# Patient Record
Sex: MALE | Race: WHITE | NOT HISPANIC OR LATINO | Employment: OTHER | URBAN - METROPOLITAN AREA
[De-identification: names, ages, dates, MRNs, and addresses within clinical notes are randomized per-mention and may not be internally consistent; named-entity substitution may affect disease eponyms.]

---

## 2018-09-18 ENCOUNTER — APPOINTMENT (OUTPATIENT)
Dept: ADMISSIONS | Facility: MEDICAL CENTER | Age: 63
DRG: 029 | End: 2018-09-18
Payer: COMMERCIAL

## 2018-09-18 ENCOUNTER — HOSPITAL ENCOUNTER (OUTPATIENT)
Dept: LAB | Facility: MEDICAL CENTER | Age: 63
End: 2018-09-18
Attending: NEUROLOGICAL SURGERY
Payer: COMMERCIAL

## 2018-09-18 LAB
25(OH)D3 SERPL-MCNC: 25 NG/ML (ref 30–100)
ANION GAP SERPL CALC-SCNC: 7 MMOL/L (ref 0–11.9)
APPEARANCE UR: CLEAR
APTT PPP: 27.5 SEC (ref 24.7–36)
BILIRUB UR QL STRIP.AUTO: NEGATIVE
BUN SERPL-MCNC: 20 MG/DL (ref 8–22)
CALCIUM SERPL-MCNC: 9.7 MG/DL (ref 8.5–10.5)
CHLORIDE SERPL-SCNC: 101 MMOL/L (ref 96–112)
CO2 SERPL-SCNC: 32 MMOL/L (ref 20–33)
COLOR UR: ABNORMAL
CREAT SERPL-MCNC: 0.91 MG/DL (ref 0.5–1.4)
GLUCOSE SERPL-MCNC: 124 MG/DL (ref 65–99)
GLUCOSE UR STRIP.AUTO-MCNC: NEGATIVE MG/DL
INR PPP: 1.13 (ref 0.87–1.13)
KETONES UR STRIP.AUTO-MCNC: ABNORMAL MG/DL
LEUKOCYTE ESTERASE UR QL STRIP.AUTO: NEGATIVE
MICRO URNS: ABNORMAL
NITRITE UR QL STRIP.AUTO: NEGATIVE
PH UR STRIP.AUTO: 6.5 [PH]
POTASSIUM SERPL-SCNC: 3.9 MMOL/L (ref 3.6–5.5)
PROT UR QL STRIP: NEGATIVE MG/DL
PROTHROMBIN TIME: 14.2 SEC (ref 12–14.6)
RBC UR QL AUTO: NEGATIVE
SODIUM SERPL-SCNC: 140 MMOL/L (ref 135–145)
SP GR UR STRIP.AUTO: 1.02
UROBILINOGEN UR STRIP.AUTO-MCNC: 1 MG/DL

## 2018-09-18 PROCEDURE — 85610 PROTHROMBIN TIME: CPT

## 2018-09-18 PROCEDURE — 36415 COLL VENOUS BLD VENIPUNCTURE: CPT

## 2018-09-18 PROCEDURE — 81003 URINALYSIS AUTO W/O SCOPE: CPT

## 2018-09-18 PROCEDURE — 80048 BASIC METABOLIC PNL TOTAL CA: CPT

## 2018-09-18 PROCEDURE — 85730 THROMBOPLASTIN TIME PARTIAL: CPT

## 2018-09-18 PROCEDURE — 82306 VITAMIN D 25 HYDROXY: CPT

## 2018-09-19 ENCOUNTER — APPOINTMENT (OUTPATIENT)
Dept: RADIOLOGY | Facility: MEDICAL CENTER | Age: 63
DRG: 029 | End: 2018-09-19
Attending: NEUROLOGICAL SURGERY
Payer: COMMERCIAL

## 2018-09-19 ENCOUNTER — HOSPITAL ENCOUNTER (INPATIENT)
Facility: MEDICAL CENTER | Age: 63
LOS: 7 days | DRG: 029 | End: 2018-09-26
Attending: NEUROLOGICAL SURGERY | Admitting: NEUROLOGICAL SURGERY
Payer: COMMERCIAL

## 2018-09-19 DIAGNOSIS — D33.4 BENIGN NEOPLASM OF SPINAL CORD (HCC): ICD-10-CM

## 2018-09-19 DIAGNOSIS — D49.7 INTRADURAL EXTRAMEDULLARY SPINAL TUMOR: Primary | ICD-10-CM

## 2018-09-19 DIAGNOSIS — G89.18 POSTOPERATIVE PAIN: ICD-10-CM

## 2018-09-19 LAB
ANION GAP SERPL CALC-SCNC: 8 MMOL/L (ref 0–11.9)
BUN SERPL-MCNC: 20 MG/DL (ref 8–22)
CALCIUM SERPL-MCNC: 8.7 MG/DL (ref 8.5–10.5)
CHLORIDE SERPL-SCNC: 106 MMOL/L (ref 96–112)
CO2 SERPL-SCNC: 24 MMOL/L (ref 20–33)
CREAT SERPL-MCNC: 0.81 MG/DL (ref 0.5–1.4)
EKG IMPRESSION: NORMAL
ERYTHROCYTE [DISTWIDTH] IN BLOOD BY AUTOMATED COUNT: 52.8 FL (ref 35.9–50)
GLUCOSE SERPL-MCNC: 121 MG/DL (ref 65–99)
HCT VFR BLD AUTO: 48.5 % (ref 42–52)
HGB BLD-MCNC: 16 G/DL (ref 14–18)
MAGNESIUM SERPL-MCNC: 1.9 MG/DL (ref 1.5–2.5)
MCH RBC QN AUTO: 33.4 PG (ref 27–33)
MCHC RBC AUTO-ENTMCNC: 33 G/DL (ref 33.7–35.3)
MCV RBC AUTO: 101.3 FL (ref 81.4–97.8)
PLATELET # BLD AUTO: 181 K/UL (ref 164–446)
PMV BLD AUTO: 11.6 FL (ref 9–12.9)
POTASSIUM SERPL-SCNC: 4.6 MMOL/L (ref 3.6–5.5)
RBC # BLD AUTO: 4.79 M/UL (ref 4.7–6.1)
SODIUM SERPL-SCNC: 138 MMOL/L (ref 135–145)
TSH SERPL DL<=0.005 MIU/L-ACNC: 1.31 UIU/ML (ref 0.38–5.33)
WBC # BLD AUTO: 10.5 K/UL (ref 4.8–10.8)

## 2018-09-19 PROCEDURE — 700105 HCHG RX REV CODE 258: Performed by: INTERNAL MEDICINE

## 2018-09-19 PROCEDURE — 95940 IONM IN OPERATNG ROOM 15 MIN: CPT | Performed by: NEUROLOGICAL SURGERY

## 2018-09-19 PROCEDURE — 500002 HCHG ADHESIVE, DERMABOND: Performed by: NEUROLOGICAL SURGERY

## 2018-09-19 PROCEDURE — 500331 HCHG COTTONOID, SURG PATTIE: Performed by: NEUROLOGICAL SURGERY

## 2018-09-19 PROCEDURE — 00BT0ZZ EXCISION OF SPINAL MENINGES, OPEN APPROACH: ICD-10-PCS | Performed by: NEUROLOGICAL SURGERY

## 2018-09-19 PROCEDURE — 51785 ANAL/URINARY MUSCLE STUDY: CPT | Performed by: NEUROLOGICAL SURGERY

## 2018-09-19 PROCEDURE — 160029 HCHG SURGERY MINUTES - 1ST 30 MINS LEVEL 4: Performed by: NEUROLOGICAL SURGERY

## 2018-09-19 PROCEDURE — 85027 COMPLETE CBC AUTOMATED: CPT

## 2018-09-19 PROCEDURE — 93005 ELECTROCARDIOGRAM TRACING: CPT | Performed by: ANESTHESIOLOGY

## 2018-09-19 PROCEDURE — 84443 ASSAY THYROID STIM HORMONE: CPT

## 2018-09-19 PROCEDURE — 160041 HCHG SURGERY MINUTES - EA ADDL 1 MIN LEVEL 4: Performed by: NEUROLOGICAL SURGERY

## 2018-09-19 PROCEDURE — 110371 HCHG SHELL REV 272: Performed by: NEUROLOGICAL SURGERY

## 2018-09-19 PROCEDURE — 160035 HCHG PACU - 1ST 60 MINS PHASE I: Performed by: NEUROLOGICAL SURGERY

## 2018-09-19 PROCEDURE — 93010 ELECTROCARDIOGRAM REPORT: CPT | Performed by: INTERNAL MEDICINE

## 2018-09-19 PROCEDURE — 700111 HCHG RX REV CODE 636 W/ 250 OVERRIDE (IP): Performed by: NEUROLOGICAL SURGERY

## 2018-09-19 PROCEDURE — 770022 HCHG ROOM/CARE - ICU (200)

## 2018-09-19 PROCEDURE — A4338 INDWELLING CATHETER LATEX: HCPCS | Performed by: NEUROLOGICAL SURGERY

## 2018-09-19 PROCEDURE — 500367 HCHG DRAIN KIT, HEMOVAC: Performed by: NEUROLOGICAL SURGERY

## 2018-09-19 PROCEDURE — 160048 HCHG OR STATISTICAL LEVEL 1-5: Performed by: NEUROLOGICAL SURGERY

## 2018-09-19 PROCEDURE — 95938 SOMATOSENSORY TESTING: CPT | Performed by: NEUROLOGICAL SURGERY

## 2018-09-19 PROCEDURE — 72020 X-RAY EXAM OF SPINE 1 VIEW: CPT

## 2018-09-19 PROCEDURE — 83735 ASSAY OF MAGNESIUM: CPT

## 2018-09-19 PROCEDURE — 95937 NEUROMUSCULAR JUNCTION TEST: CPT | Performed by: NEUROLOGICAL SURGERY

## 2018-09-19 PROCEDURE — 88311 DECALCIFY TISSUE: CPT

## 2018-09-19 PROCEDURE — 160009 HCHG ANES TIME/MIN: Performed by: NEUROLOGICAL SURGERY

## 2018-09-19 PROCEDURE — 88307 TISSUE EXAM BY PATHOLOGIST: CPT

## 2018-09-19 PROCEDURE — 700102 HCHG RX REV CODE 250 W/ 637 OVERRIDE(OP): Performed by: NEUROLOGICAL SURGERY

## 2018-09-19 PROCEDURE — A9270 NON-COVERED ITEM OR SERVICE: HCPCS | Performed by: NEUROLOGICAL SURGERY

## 2018-09-19 PROCEDURE — 700111 HCHG RX REV CODE 636 W/ 250 OVERRIDE (IP): Performed by: INTERNAL MEDICINE

## 2018-09-19 PROCEDURE — 700111 HCHG RX REV CODE 636 W/ 250 OVERRIDE (IP): Performed by: PHYSICIAN ASSISTANT

## 2018-09-19 PROCEDURE — 80048 BASIC METABOLIC PNL TOTAL CA: CPT

## 2018-09-19 PROCEDURE — 95861 NEEDLE EMG 2 EXTREMITIES: CPT | Performed by: NEUROLOGICAL SURGERY

## 2018-09-19 PROCEDURE — 700111 HCHG RX REV CODE 636 W/ 250 OVERRIDE (IP)

## 2018-09-19 PROCEDURE — 502708 HCHG CATHETER, ON-Q SILVER SKR: Performed by: NEUROLOGICAL SURGERY

## 2018-09-19 PROCEDURE — 160036 HCHG PACU - EA ADDL 30 MINS PHASE I: Performed by: NEUROLOGICAL SURGERY

## 2018-09-19 PROCEDURE — 501838 HCHG SUTURE GENERAL: Performed by: NEUROLOGICAL SURGERY

## 2018-09-19 PROCEDURE — 110454 HCHG SHELL REV 250: Performed by: NEUROLOGICAL SURGERY

## 2018-09-19 PROCEDURE — 160002 HCHG RECOVERY MINUTES (STAT): Performed by: NEUROLOGICAL SURGERY

## 2018-09-19 PROCEDURE — 700101 HCHG RX REV CODE 250: Performed by: NEUROLOGICAL SURGERY

## 2018-09-19 PROCEDURE — 700101 HCHG RX REV CODE 250

## 2018-09-19 PROCEDURE — A4306 DRUG DELIVERY SYSTEM <=50 ML: HCPCS | Performed by: NEUROLOGICAL SURGERY

## 2018-09-19 PROCEDURE — 95939 C MOTOR EVOKED UPR&LWR LIMBS: CPT | Performed by: NEUROLOGICAL SURGERY

## 2018-09-19 PROCEDURE — 99291 CRITICAL CARE FIRST HOUR: CPT | Performed by: INTERNAL MEDICINE

## 2018-09-19 DEVICE — SEALANT DURAL AUTOSPRAY ADHERUS (5EA/PK): Type: IMPLANTABLE DEVICE | Site: BACK | Status: FUNCTIONAL

## 2018-09-19 RX ORDER — HYDROMORPHONE HYDROCHLORIDE 2 MG/ML
0.4 INJECTION, SOLUTION INTRAMUSCULAR; INTRAVENOUS; SUBCUTANEOUS
Status: DISCONTINUED | OUTPATIENT
Start: 2018-09-19 | End: 2018-09-19 | Stop reason: HOSPADM

## 2018-09-19 RX ORDER — ACETAMINOPHEN 10 MG/ML
1000 INJECTION, SOLUTION INTRAVENOUS
Status: DISCONTINUED | OUTPATIENT
Start: 2018-09-19 | End: 2018-09-19 | Stop reason: HOSPADM

## 2018-09-19 RX ORDER — AMOXICILLIN 250 MG
1 CAPSULE ORAL
Status: DISCONTINUED | OUTPATIENT
Start: 2018-09-19 | End: 2018-09-26 | Stop reason: HOSPADM

## 2018-09-19 RX ORDER — DIPHENHYDRAMINE HYDROCHLORIDE 50 MG/ML
12.5 INJECTION INTRAMUSCULAR; INTRAVENOUS
Status: DISCONTINUED | OUTPATIENT
Start: 2018-09-19 | End: 2018-09-19 | Stop reason: HOSPADM

## 2018-09-19 RX ORDER — BUPIVACAINE HYDROCHLORIDE AND EPINEPHRINE 5; 5 MG/ML; UG/ML
INJECTION, SOLUTION EPIDURAL; INTRACAUDAL; PERINEURAL
Status: DISCONTINUED | OUTPATIENT
Start: 2018-09-19 | End: 2018-09-19 | Stop reason: HOSPADM

## 2018-09-19 RX ORDER — MEPERIDINE HYDROCHLORIDE 25 MG/ML
6.25 INJECTION INTRAMUSCULAR; INTRAVENOUS; SUBCUTANEOUS
Status: DISCONTINUED | OUTPATIENT
Start: 2018-09-19 | End: 2018-09-19 | Stop reason: HOSPADM

## 2018-09-19 RX ORDER — DEXAMETHASONE SODIUM PHOSPHATE 4 MG/ML
4 INJECTION, SOLUTION INTRA-ARTICULAR; INTRALESIONAL; INTRAMUSCULAR; INTRAVENOUS; SOFT TISSUE EVERY 6 HOURS
Status: COMPLETED | OUTPATIENT
Start: 2018-09-19 | End: 2018-09-20

## 2018-09-19 RX ORDER — HYDROMORPHONE HYDROCHLORIDE 2 MG/ML
0.2 INJECTION, SOLUTION INTRAMUSCULAR; INTRAVENOUS; SUBCUTANEOUS
Status: DISCONTINUED | OUTPATIENT
Start: 2018-09-19 | End: 2018-09-19 | Stop reason: HOSPADM

## 2018-09-19 RX ORDER — SODIUM CHLORIDE, SODIUM LACTATE, POTASSIUM CHLORIDE, CALCIUM CHLORIDE 600; 310; 30; 20 MG/100ML; MG/100ML; MG/100ML; MG/100ML
INJECTION, SOLUTION INTRAVENOUS CONTINUOUS
Status: DISCONTINUED | OUTPATIENT
Start: 2018-09-19 | End: 2018-09-21

## 2018-09-19 RX ORDER — MORPHINE SULFATE 4 MG/ML
2 INJECTION, SOLUTION INTRAMUSCULAR; INTRAVENOUS ONCE
Status: DISCONTINUED | OUTPATIENT
Start: 2018-09-19 | End: 2018-09-19

## 2018-09-19 RX ORDER — PROMETHAZINE HYDROCHLORIDE 25 MG/1
12.5-25 TABLET ORAL EVERY 4 HOURS PRN
Status: DISCONTINUED | OUTPATIENT
Start: 2018-09-19 | End: 2018-09-26 | Stop reason: HOSPADM

## 2018-09-19 RX ORDER — AMOXICILLIN 250 MG
1 CAPSULE ORAL NIGHTLY
Status: DISCONTINUED | OUTPATIENT
Start: 2018-09-19 | End: 2018-09-26 | Stop reason: HOSPADM

## 2018-09-19 RX ORDER — DIPHENHYDRAMINE HYDROCHLORIDE 50 MG/ML
25 INJECTION INTRAMUSCULAR; INTRAVENOUS EVERY 6 HOURS PRN
Status: DISCONTINUED | OUTPATIENT
Start: 2018-09-19 | End: 2018-09-25

## 2018-09-19 RX ORDER — ALPRAZOLAM 0.25 MG/1
0.25 TABLET ORAL 2 TIMES DAILY PRN
Status: DISCONTINUED | OUTPATIENT
Start: 2018-09-19 | End: 2018-09-26 | Stop reason: HOSPADM

## 2018-09-19 RX ORDER — MORPHINE SULFATE 4 MG/ML
2 INJECTION, SOLUTION INTRAMUSCULAR; INTRAVENOUS
Status: DISCONTINUED | OUTPATIENT
Start: 2018-09-19 | End: 2018-09-19

## 2018-09-19 RX ORDER — ACETAMINOPHEN 500 MG
1000 TABLET ORAL EVERY 6 HOURS
Status: COMPLETED | OUTPATIENT
Start: 2018-09-19 | End: 2018-09-24

## 2018-09-19 RX ORDER — CEFAZOLIN SODIUM 2 G/100ML
2 INJECTION, SOLUTION INTRAVENOUS EVERY 8 HOURS
Status: COMPLETED | OUTPATIENT
Start: 2018-09-19 | End: 2018-09-20

## 2018-09-19 RX ORDER — BISACODYL 10 MG
10 SUPPOSITORY, RECTAL RECTAL
Status: DISCONTINUED | OUTPATIENT
Start: 2018-09-19 | End: 2018-09-26 | Stop reason: HOSPADM

## 2018-09-19 RX ORDER — MAGNESIUM HYDROXIDE 1200 MG/15ML
LIQUID ORAL
Status: COMPLETED | OUTPATIENT
Start: 2018-09-19 | End: 2018-09-19

## 2018-09-19 RX ORDER — ONDANSETRON 4 MG/1
4 TABLET, ORALLY DISINTEGRATING ORAL EVERY 4 HOURS PRN
Status: DISCONTINUED | OUTPATIENT
Start: 2018-09-19 | End: 2018-09-26 | Stop reason: HOSPADM

## 2018-09-19 RX ORDER — PROMETHAZINE HYDROCHLORIDE 25 MG/1
12.5-25 SUPPOSITORY RECTAL EVERY 4 HOURS PRN
Status: DISCONTINUED | OUTPATIENT
Start: 2018-09-19 | End: 2018-09-26 | Stop reason: HOSPADM

## 2018-09-19 RX ORDER — DIGOXIN 125 MCG
125 TABLET ORAL DAILY
Status: DISCONTINUED | OUTPATIENT
Start: 2018-09-20 | End: 2018-09-19

## 2018-09-19 RX ORDER — HYDRALAZINE HYDROCHLORIDE 20 MG/ML
10 INJECTION INTRAMUSCULAR; INTRAVENOUS
Status: DISCONTINUED | OUTPATIENT
Start: 2018-09-19 | End: 2018-09-26 | Stop reason: HOSPADM

## 2018-09-19 RX ORDER — DIGOXIN 250 MCG
250 TABLET ORAL DAILY
Status: DISCONTINUED | OUTPATIENT
Start: 2018-09-20 | End: 2018-09-26 | Stop reason: HOSPADM

## 2018-09-19 RX ORDER — DIPHENHYDRAMINE HCL 25 MG
25 TABLET ORAL EVERY 6 HOURS PRN
Status: DISCONTINUED | OUTPATIENT
Start: 2018-09-19 | End: 2018-09-25

## 2018-09-19 RX ORDER — ACETAMINOPHEN 10 MG/ML
1000 INJECTION, SOLUTION INTRAVENOUS
Status: DISCONTINUED | OUTPATIENT
Start: 2018-09-19 | End: 2018-09-19

## 2018-09-19 RX ORDER — SODIUM CHLORIDE AND POTASSIUM CHLORIDE 150; 900 MG/100ML; MG/100ML
INJECTION, SOLUTION INTRAVENOUS CONTINUOUS
Status: DISCONTINUED | OUTPATIENT
Start: 2018-09-19 | End: 2018-09-20

## 2018-09-19 RX ORDER — DIGOXIN 0.25 MG/ML
250 INJECTION INTRAMUSCULAR; INTRAVENOUS ONCE
Status: DISPENSED | OUTPATIENT
Start: 2018-09-19 | End: 2018-09-20

## 2018-09-19 RX ORDER — POLYETHYLENE GLYCOL 3350 17 G/17G
1 POWDER, FOR SOLUTION ORAL 2 TIMES DAILY PRN
Status: DISCONTINUED | OUTPATIENT
Start: 2018-09-19 | End: 2018-09-25

## 2018-09-19 RX ORDER — VANCOMYCIN HCL 900 MCG/MG
POWDER (GRAM) MISCELLANEOUS
Status: DISCONTINUED | OUTPATIENT
Start: 2018-09-19 | End: 2018-09-19 | Stop reason: HOSPADM

## 2018-09-19 RX ORDER — DEXTROSE MONOHYDRATE 50 MG/ML
INJECTION, SOLUTION INTRAVENOUS CONTINUOUS
Status: DISCONTINUED | OUTPATIENT
Start: 2018-09-19 | End: 2018-09-21

## 2018-09-19 RX ORDER — HALOPERIDOL 5 MG/ML
1 INJECTION INTRAMUSCULAR
Status: DISCONTINUED | OUTPATIENT
Start: 2018-09-19 | End: 2018-09-19 | Stop reason: HOSPADM

## 2018-09-19 RX ORDER — LIDOCAINE HYDROCHLORIDE 10 MG/ML
0.5 INJECTION, SOLUTION INFILTRATION; PERINEURAL
Status: DISCONTINUED | OUTPATIENT
Start: 2018-09-19 | End: 2018-09-19 | Stop reason: HOSPADM

## 2018-09-19 RX ORDER — MORPHINE SULFATE 4 MG/ML
2 INJECTION, SOLUTION INTRAMUSCULAR; INTRAVENOUS
Status: DISCONTINUED | OUTPATIENT
Start: 2018-09-19 | End: 2018-09-26 | Stop reason: HOSPADM

## 2018-09-19 RX ORDER — OXYCODONE HYDROCHLORIDE 5 MG/1
2.5 TABLET ORAL
Status: DISCONTINUED | OUTPATIENT
Start: 2018-09-19 | End: 2018-09-19

## 2018-09-19 RX ORDER — NALOXONE HYDROCHLORIDE 0.4 MG/ML
0.1 INJECTION, SOLUTION INTRAMUSCULAR; INTRAVENOUS; SUBCUTANEOUS PRN
Status: DISCONTINUED | OUTPATIENT
Start: 2018-09-19 | End: 2018-09-19 | Stop reason: HOSPADM

## 2018-09-19 RX ORDER — ENEMA 19; 7 G/133ML; G/133ML
1 ENEMA RECTAL
Status: DISCONTINUED | OUTPATIENT
Start: 2018-09-19 | End: 2018-09-26 | Stop reason: HOSPADM

## 2018-09-19 RX ORDER — METHOCARBAMOL 750 MG/1
750 TABLET, FILM COATED ORAL EVERY 8 HOURS PRN
Status: DISCONTINUED | OUTPATIENT
Start: 2018-09-19 | End: 2018-09-26 | Stop reason: HOSPADM

## 2018-09-19 RX ORDER — OXYCODONE HYDROCHLORIDE 5 MG/1
5 TABLET ORAL
Status: DISCONTINUED | OUTPATIENT
Start: 2018-09-19 | End: 2018-09-19

## 2018-09-19 RX ORDER — LABETALOL HYDROCHLORIDE 5 MG/ML
10 INJECTION, SOLUTION INTRAVENOUS
Status: DISCONTINUED | OUTPATIENT
Start: 2018-09-19 | End: 2018-09-26 | Stop reason: HOSPADM

## 2018-09-19 RX ORDER — SODIUM CHLORIDE, SODIUM LACTATE, POTASSIUM CHLORIDE, CALCIUM CHLORIDE 600; 310; 30; 20 MG/100ML; MG/100ML; MG/100ML; MG/100ML
INJECTION, SOLUTION INTRAVENOUS CONTINUOUS
Status: DISCONTINUED | OUTPATIENT
Start: 2018-09-19 | End: 2018-09-19 | Stop reason: HOSPADM

## 2018-09-19 RX ORDER — ONDANSETRON 2 MG/ML
4 INJECTION INTRAMUSCULAR; INTRAVENOUS
Status: DISCONTINUED | OUTPATIENT
Start: 2018-09-19 | End: 2018-09-19 | Stop reason: HOSPADM

## 2018-09-19 RX ORDER — ONDANSETRON 2 MG/ML
4 INJECTION INTRAMUSCULAR; INTRAVENOUS EVERY 4 HOURS PRN
Status: DISCONTINUED | OUTPATIENT
Start: 2018-09-19 | End: 2018-09-26 | Stop reason: HOSPADM

## 2018-09-19 RX ORDER — LORAZEPAM 2 MG/ML
0.5 INJECTION INTRAMUSCULAR
Status: DISCONTINUED | OUTPATIENT
Start: 2018-09-19 | End: 2018-09-19 | Stop reason: HOSPADM

## 2018-09-19 RX ADMIN — DEXAMETHASONE SODIUM PHOSPHATE 4 MG: 4 INJECTION, SOLUTION INTRA-ARTICULAR; INTRALESIONAL; INTRAMUSCULAR; INTRAVENOUS; SOFT TISSUE at 21:54

## 2018-09-19 RX ADMIN — ACETAMINOPHEN 1000 MG: 500 TABLET, FILM COATED ORAL at 20:49

## 2018-09-19 RX ADMIN — AMIODARONE HYDROCHLORIDE 1 MG/MIN: 50 INJECTION, SOLUTION INTRAVENOUS at 17:10

## 2018-09-19 RX ADMIN — POTASSIUM CHLORIDE AND SODIUM CHLORIDE: 900; 150 INJECTION, SOLUTION INTRAVENOUS at 20:58

## 2018-09-19 RX ADMIN — CEFAZOLIN SODIUM 2 G: 2 INJECTION, SOLUTION INTRAVENOUS at 21:54

## 2018-09-19 RX ADMIN — DEXTROSE MONOHYDRATE: 50 INJECTION, SOLUTION INTRAVENOUS at 21:06

## 2018-09-19 RX ADMIN — MORPHINE SULFATE: 50 INJECTION, SOLUTION, CONCENTRATE INTRAVENOUS at 17:30

## 2018-09-19 RX ADMIN — AMIODARONE HYDROCHLORIDE 150 MG: 1.5 INJECTION, SOLUTION INTRAVENOUS at 16:45

## 2018-09-19 ASSESSMENT — COGNITIVE AND FUNCTIONAL STATUS - GENERAL
WALKING IN HOSPITAL ROOM: A LITTLE
TURNING FROM BACK TO SIDE WHILE IN FLAT BAD: A LITTLE
PERSONAL GROOMING: A LITTLE
DAILY ACTIVITIY SCORE: 18
STANDING UP FROM CHAIR USING ARMS: A LITTLE
HELP NEEDED FOR BATHING: A LITTLE
TOILETING: A LITTLE
DRESSING REGULAR LOWER BODY CLOTHING: A LITTLE
MOBILITY SCORE: 18
EATING MEALS: A LITTLE
SUGGESTED CMS G CODE MODIFIER DAILY ACTIVITY: CK
DRESSING REGULAR UPPER BODY CLOTHING: A LITTLE
MOVING TO AND FROM BED TO CHAIR: A LITTLE
SUGGESTED CMS G CODE MODIFIER MOBILITY: CK
MOVING FROM LYING ON BACK TO SITTING ON SIDE OF FLAT BED: A LITTLE
CLIMB 3 TO 5 STEPS WITH RAILING: A LITTLE

## 2018-09-19 ASSESSMENT — COPD QUESTIONNAIRES
HAVE YOU SMOKED AT LEAST 100 CIGARETTES IN YOUR ENTIRE LIFE: NO/DON'T KNOW
COPD SCREENING SCORE: 2
DURING THE PAST 4 WEEKS HOW MUCH DID YOU FEEL SHORT OF BREATH: NONE/LITTLE OF THE TIME
DO YOU EVER COUGH UP ANY MUCUS OR PHLEGM?: NO/ONLY WITH OCCASIONAL COLDS OR INFECTIONS
IN THE PAST 12 MONTHS DO YOU DO LESS THAN YOU USED TO BECAUSE OF YOUR BREATHING PROBLEMS: DISAGREE/UNSURE

## 2018-09-19 ASSESSMENT — PAIN SCALES - GENERAL
PAINLEVEL_OUTOF10: 6
PAINLEVEL_OUTOF10: 5
PAINLEVEL_OUTOF10: 6
PAINLEVEL_OUTOF10: 6
PAINLEVEL_OUTOF10: 7
PAINLEVEL_OUTOF10: 0

## 2018-09-19 ASSESSMENT — PATIENT HEALTH QUESTIONNAIRE - PHQ9
2. FEELING DOWN, DEPRESSED, IRRITABLE, OR HOPELESS: NOT AT ALL
1. LITTLE INTEREST OR PLEASURE IN DOING THINGS: NOT AT ALL
SUM OF ALL RESPONSES TO PHQ9 QUESTIONS 1 AND 2: 0

## 2018-09-19 ASSESSMENT — LIFESTYLE VARIABLES: ALCOHOL_USE: NO

## 2018-09-19 NOTE — PROGRESS NOTES
Med rec updated and complete  Allergies reviewed  Pt reports no vitamins.  Pt reports no antibiotics in the last 30 days.  Pt reports that he took his DIGOXIN 250MCG today (9/19/2018) @ 0800, but coughed it up on the way here.

## 2018-09-19 NOTE — OR SURGEON
Immediate Post OP Note  Preop dx: NF-, L1 intradural tumor  Post-Op Diagnosis Codes:     * Intradural tumor [D49.7]    Procedure(s):  LUMBAR LAMINECTOMY- T12-L1 DECOMPRESSIVE LAMI AND RESECTION OF INTRADURAL EXTRAMEDULLARY TUMOR - Wound Class: Clean with Drain    Surgeon(s):  Fabi Castano M.D.    Anesthesiologist/Type of Anesthesia:  Anesthesiologist: Ann Marie Renee M.D./General    Surgical Staff:  Circulator: Sandi Holden R.N.  Scrub Person: Kesha Jarrell Assist: Mango Denton P.A.-C.  Radiology Technologist: Tomasz OLVERA Gross    Specimens removed if any:  ID Type Source Tests Collected by Time Destination   A : L1 intradural nerve shealth tumor  Tissue Back PATHOLOGY SPECIMEN Fabi Castano M.D. 9/19/2018 1433        Assistants:[place]we[place] Denton PA-C  ,  Specimen: nil    Estimated Blood Loss: 100 cc    Findings: n/a    Complications: nil  Specimen- x2 tumors      9/19/2018 3:36 PM Fabi Castano M.D.

## 2018-09-19 NOTE — CONSULTS
"Cardiology Consult Note    Date & Time note created:    9/19/2018   4:21 PM       Patient ID:   Name:             Chevy Angeles   YOB: 1955  Age:                 62 y.o.  male   MRN:               [id number]               Referring Physician: Dr. Castano                                                  Reason for Consult:      Atrial fibrillation    History of Present Illness:    62-year-old male who was admitted for a T12-L1 decompressive laminectomy and found to be in atrial fibrillation with rapid ventricular response.  I personally spoke with the anesthesiologist who reported that after induction patient had a short run of atrial fibrillation with rapid ventricular response during which time he was hemodynamically stable.  He self converted to sinus rhythm at surgery went as planned without any complications.  On arrival to the PACU, he was found to be in rapid ventricular response.  Patient is still recovering from anesthesia.  Unable to answer any questions.  His blood pressures have been stable.    He has a G-tube in place.  Also has a history of dysphasia.  He took his digoxin this morning however reports having violent emesis right after swallowing his pill he reports seeing his pill come out in his emesis.  He did not really take his digoxin after that.    Review of Systems:      A full review of systems could not be completed as the patient is recovering from anesthesia.    Past Medical History:   Past Medical History:   Diagnosis Date   • Anesthesia     \"difficulty swallowing\"   • Arrhythmia     afib   • Cancer (HCC) 2013    benign tumors   • Indigestion    • Infectious disease 2007    MRSA   • Pneumonia    • Sleep apnea     bipap       Past Surgical History:  Past Surgical History:   Procedure Laterality Date   • CERVICAL LAMINECTOMY POSTERIOR  11/12/2013    Performed by Fabi Castano M.D. at SURGERY [facility]   • CERVICAL DECOMPRESSION POSTERIOR  11/12/2013    Performed " by Fabi Castano M.D. at SURGERY [facility]   • CERVICAL FORAMINOTOMY  11/12/2013    Performed by Fabi Castano M.D. at SURGERY [facility]   • RECOVERY  7/29/2013    Performed by [facility] at [facility]   • THORACIC LAMINECTOMY  7/22/2013    Performed by Fabi Castano M.D. at SURGERY [facility]   • LESION EXCISION NEURO  7/22/2013    Performed by Fabi Castano M.D. at SURGERY [facility]   • OTHER ORTHOPEDIC SURGERY  2012    clavical   • OTHER  2010    throat surgery   • OTHER NEUROLOGICAL SURG  2007    SCHWANNOMA REMOVED FROM BRAIN STEM   • OTHER ORTHOPEDIC SURGERY  2006    LEFT ELBOW SURGERY W/HARDWARE   • OTHER ABDOMINAL SURGERY  1999    ABD SCHWANOMA REMOVED   • OTHER ORTHOPEDIC SURGERY      RIGHT KNEE SHWANNOMA REMOVED X 2   • OTHER ORTHOPEDIC SURGERY      LEFT HIP SCHWANNOMA REMOVED   • OTHER ORTHOPEDIC SURGERY      RIGHT UPPER LEG SCHWANNOMA REMOVED   • OTHER ORTHOPEDIC SURGERY      RIGHT UPPER ARM SCHWANNOMA REMOVED       Family History:  No family history on file.  Could not be obtained as the patient is recovering from anesthesia.    Social History:  Social History     Social History   • Marital status: Single     Spouse name: N/A   • Number of children: N/A   • Years of education: N/A     Occupational History   • Not on file.     Social History Main Topics   • Smoking status: Never Smoker   • Smokeless tobacco: Never Used   • Alcohol use No   • Drug use: No   • Sexual activity: Not on file     Other Topics Concern   • Not on file     Social History Narrative   • No narrative on file   Could not be obtained as the patient is recovering from anesthesia.    Hospital Medications:    Current Facility-Administered Medications:   •  lactated ringers infusion, , Intravenous, Continuous, Fabi Castano M.D.  •  ondansetron (ZOFRAN) syringe/vial injection 4 mg, 4 mg, Intravenous, Once PRN, Ann Marie Renee M.D.  •  haloperidol lactate (HALDOL) injection 1 mg, 1  "mg, Intravenous, Q15 MIN PRN, Ann Marie Renee M.D.  •  diphenhydrAMINE (BENADRYL) injection 12.5 mg, 12.5 mg, Intravenous, Q15 MIN PRN, Ann Marie Renee M.D.  •  naloxone (NARCAN) injection 0.1 mg, 0.1 mg, Intravenous, PRN, Ann Marie Renee M.D.  •  atropine injection 0.5 mg, 0.5 mg, Intravenous, Q5 MIN PRN, Ann Marie Renee M.D.  •  lactated ringers infusion, , Intravenous, Continuous, Ann Marie Renee M.D.  •  fentaNYL (SUBLIMAZE) injection 50 mcg, 50 mcg, Intravenous, Q2 MIN PRN, Ann Marie Renee M.D.  •  HYDROmorphone (DILAUDID) injection 0.2 mg, 0.2 mg, Intravenous, Q5 MIN PRN **OR** HYDROmorphone (DILAUDID) injection 0.4 mg, 0.4 mg, Intravenous, Q5 MIN PRN, Ann Marie Renee M.D.  •  meperidine (DEMEROL) injection 6.5 mg, 6.5 mg, Intravenous, Q5 MIN PRN, Ann Marie Renee M.D.  •  LORazepam (ATIVAN) injection 0.5 mg, 0.5 mg, Intravenous, Q10 MIN PRN, Ann Marie Renee M.D.  •  D5W infusion, , Intravenous, Continuous, Celia Rocha M.D.  •  amiodarone (CORDARONE) 450 mg in D5W 250 mL Infusion, 0.5-1 mg/min, Intravenous, Continuous, Celia Rocha M.D.  •  amiodarone (NEXTERONE) IVPB 150 mg, 150 mg, Intravenous, Once, Celia Rocha M.D.  •  ropivacaine (NAROPIN) 2 mg/mL 270 mL in On-Q Pump infusion, , Other, Continuous, Fabi Castano M.D.    Medication Allergy:  No Known Allergies    Physical Exam:  Vitals/ General Appearance:   Weight/BMI: Body mass index is 22.62 kg/m².  Blood pressure 116/85, pulse 76, temperature 37.1 °C (98.7 °F), resp. rate 17, height 1.981 m (6' 6\"), weight 88.8 kg (195 lb 12.3 oz).  Vitals:    09/19/18 1100 09/19/18 1213   BP: 116/85    Pulse: 76    Resp: 17    Temp: 37.1 °C (98.7 °F)    Weight:  88.8 kg (195 lb 12.3 oz)   Height:  1.981 m (6' 6\")       Constitutional:   No acute distress   HEENT:  Normocephalic, Atraumatic  Eyes: Conjunctiva normal  Neck:  Normal range of motion, no JVD.  Cardiovascular: Tachycardic, irregular rhythm, No murmurs, No rubs, No gallops. Extremities with intact distal " pulses, no cyanosis, or edema.   Lungs:  Normal breath sounds, breath sounds clear to auscultation bilaterally,  no crackles, no wheezing.   Abdomen:  Soft, No tenderness  Skin: No rash  Neurologic: Could not evaluate as the patient is recovering from anesthesia.  Psychiatric: Could not be evaluated as the patient is recovering from anesthesia.    MDM (Data Review):     Records reviewed and summarized in current documentation    Lab Data Review:      Recent Labs      09/18/18   1614   SODIUM  140   POTASSIUM  3.9   CHLORIDE  101   CO2  32   GLUCOSE  124*   BUN  20   CREATININE  0.91   CALCIUM  9.7           Labs reviewed as noted above.  Normal electrolytes.  Normal creatinine.    Imaging/Procedures Review:      EKG: EKG from today was personally reviewed and shows atrial fibrillation with rapid ventricular response.  Nonspecific ST-T wave changes diffusely.    MDM (Assessment and Plan):     There are no active hospital problems to display for this patient.    Atrial fibrillation: Patient has a known diagnosis of atrial fibrillation. He is on a NOAC, however this was held for surgery.  As noted above patient was unable to take his digoxin preprocedure.  His blood pressure is borderline at this time.  He will receive a digoxin 250 mcg IV dose today ×1, daily dosing to start from tomorrow.  He will also be started on amiodarone drip with a bolus.  No anticoagulation to be started since he is postop.  If he continues to be in atrial fibrillation for 48 hours after the procedure, we will need to discuss the risks and benefits of anticoagulation.  For now okay to hold.  An echocardiogram has been ordered to evaluate his LV function.    I provided 35 minutes of critical care time which included 20 minutes of direct bedside patient care and 15 minutes of bedside care reviewing records as well as discussing with surgeon, Dr. Castano and anesthesiologist who was at bedside and nursing and formulating an assessment and plan.   These services are separate from billable procedures.      Thank you for allowing me to participate in the care of this patient. Please do not hesitate to contact me with any questions.    Celia Rocha MD  Cardiologist  [facility]

## 2018-09-20 LAB
ANION GAP SERPL CALC-SCNC: 5 MMOL/L (ref 0–11.9)
APTT PPP: 29 SEC (ref 24.7–36)
BUN SERPL-MCNC: 20 MG/DL (ref 8–22)
CALCIUM SERPL-MCNC: 8.3 MG/DL (ref 8.5–10.5)
CHLORIDE SERPL-SCNC: 104 MMOL/L (ref 96–112)
CO2 SERPL-SCNC: 25 MMOL/L (ref 20–33)
CREAT SERPL-MCNC: 0.79 MG/DL (ref 0.5–1.4)
ERYTHROCYTE [DISTWIDTH] IN BLOOD BY AUTOMATED COUNT: 53.2 FL (ref 35.9–50)
GLUCOSE SERPL-MCNC: 199 MG/DL (ref 65–99)
HCT VFR BLD AUTO: 45.8 % (ref 42–52)
HGB BLD-MCNC: 15 G/DL (ref 14–18)
INR PPP: 1.23 (ref 0.87–1.13)
MCH RBC QN AUTO: 33 PG (ref 27–33)
MCHC RBC AUTO-ENTMCNC: 32.8 G/DL (ref 33.7–35.3)
MCV RBC AUTO: 100.9 FL (ref 81.4–97.8)
PLATELET # BLD AUTO: 204 K/UL (ref 164–446)
PMV BLD AUTO: 12.1 FL (ref 9–12.9)
POTASSIUM SERPL-SCNC: 5.5 MMOL/L (ref 3.6–5.5)
PROTHROMBIN TIME: 15.6 SEC (ref 12–14.6)
RBC # BLD AUTO: 4.54 M/UL (ref 4.7–6.1)
SODIUM SERPL-SCNC: 134 MMOL/L (ref 135–145)
WBC # BLD AUTO: 15.6 K/UL (ref 4.8–10.8)

## 2018-09-20 PROCEDURE — G8979 MOBILITY GOAL STATUS: HCPCS | Mod: CI

## 2018-09-20 PROCEDURE — 770022 HCHG ROOM/CARE - ICU (200)

## 2018-09-20 PROCEDURE — 85027 COMPLETE CBC AUTOMATED: CPT

## 2018-09-20 PROCEDURE — 700111 HCHG RX REV CODE 636 W/ 250 OVERRIDE (IP): Performed by: INTERNAL MEDICINE

## 2018-09-20 PROCEDURE — A9270 NON-COVERED ITEM OR SERVICE: HCPCS | Performed by: NEUROLOGICAL SURGERY

## 2018-09-20 PROCEDURE — G8978 MOBILITY CURRENT STATUS: HCPCS | Mod: CK

## 2018-09-20 PROCEDURE — 97162 PT EVAL MOD COMPLEX 30 MIN: CPT

## 2018-09-20 PROCEDURE — 700105 HCHG RX REV CODE 258: Performed by: INTERNAL MEDICINE

## 2018-09-20 PROCEDURE — 85610 PROTHROMBIN TIME: CPT

## 2018-09-20 PROCEDURE — A9270 NON-COVERED ITEM OR SERVICE: HCPCS | Performed by: PHYSICIAN ASSISTANT

## 2018-09-20 PROCEDURE — 700105 HCHG RX REV CODE 258: Performed by: PHYSICIAN ASSISTANT

## 2018-09-20 PROCEDURE — 700102 HCHG RX REV CODE 250 W/ 637 OVERRIDE(OP): Performed by: NEUROLOGICAL SURGERY

## 2018-09-20 PROCEDURE — 80048 BASIC METABOLIC PNL TOTAL CA: CPT

## 2018-09-20 PROCEDURE — 85730 THROMBOPLASTIN TIME PARTIAL: CPT

## 2018-09-20 PROCEDURE — 700111 HCHG RX REV CODE 636 W/ 250 OVERRIDE (IP): Performed by: PHYSICIAN ASSISTANT

## 2018-09-20 PROCEDURE — 700111 HCHG RX REV CODE 636 W/ 250 OVERRIDE (IP): Performed by: NEUROLOGICAL SURGERY

## 2018-09-20 PROCEDURE — 700102 HCHG RX REV CODE 250 W/ 637 OVERRIDE(OP): Performed by: PHYSICIAN ASSISTANT

## 2018-09-20 PROCEDURE — 99232 SBSQ HOSP IP/OBS MODERATE 35: CPT | Performed by: INTERNAL MEDICINE

## 2018-09-20 RX ORDER — OXYCODONE HCL 5 MG/5 ML
5 SOLUTION, ORAL ORAL EVERY 4 HOURS PRN
Status: DISCONTINUED | OUTPATIENT
Start: 2018-09-20 | End: 2018-09-26 | Stop reason: HOSPADM

## 2018-09-20 RX ORDER — SODIUM CHLORIDE 9 MG/ML
INJECTION, SOLUTION INTRAVENOUS CONTINUOUS
Status: DISCONTINUED | OUTPATIENT
Start: 2018-09-20 | End: 2018-09-21

## 2018-09-20 RX ADMIN — DEXAMETHASONE SODIUM PHOSPHATE 4 MG: 4 INJECTION, SOLUTION INTRA-ARTICULAR; INTRALESIONAL; INTRAMUSCULAR; INTRAVENOUS; SOFT TISSUE at 03:39

## 2018-09-20 RX ADMIN — OXYCODONE HYDROCHLORIDE 5 MG: 5 SOLUTION ORAL at 13:11

## 2018-09-20 RX ADMIN — OXYCODONE HYDROCHLORIDE 5 MG: 5 SOLUTION ORAL at 08:40

## 2018-09-20 RX ADMIN — OXYCODONE HYDROCHLORIDE 5 MG: 5 SOLUTION ORAL at 21:50

## 2018-09-20 RX ADMIN — CEFAZOLIN SODIUM 2 G: 2 INJECTION, SOLUTION INTRAVENOUS at 06:20

## 2018-09-20 RX ADMIN — DEXAMETHASONE SODIUM PHOSPHATE 4 MG: 4 INJECTION, SOLUTION INTRA-ARTICULAR; INTRALESIONAL; INTRAMUSCULAR; INTRAVENOUS; SOFT TISSUE at 09:46

## 2018-09-20 RX ADMIN — ONDANSETRON 4 MG: 2 INJECTION INTRAMUSCULAR; INTRAVENOUS at 00:49

## 2018-09-20 RX ADMIN — AMIODARONE HYDROCHLORIDE 0.5 MG/MIN: 50 INJECTION, SOLUTION INTRAVENOUS at 02:40

## 2018-09-20 RX ADMIN — DIGOXIN 250 MCG: 250 TABLET ORAL at 06:20

## 2018-09-20 RX ADMIN — ACETAMINOPHEN 1000 MG: 500 TABLET, FILM COATED ORAL at 11:26

## 2018-09-20 RX ADMIN — STANDARDIZED SENNA CONCENTRATE AND DOCUSATE SODIUM 1 TABLET: 8.6; 5 TABLET ORAL at 21:50

## 2018-09-20 RX ADMIN — ACETAMINOPHEN 1000 MG: 500 TABLET, FILM COATED ORAL at 06:20

## 2018-09-20 RX ADMIN — SODIUM CHLORIDE: 9 INJECTION, SOLUTION INTRAVENOUS at 21:46

## 2018-09-20 RX ADMIN — OMEPRAZOLE 40 MG: 20 CAPSULE, DELAYED RELEASE ORAL at 06:19

## 2018-09-20 RX ADMIN — ACETAMINOPHEN 500 MG: 500 TABLET, FILM COATED ORAL at 18:00

## 2018-09-20 RX ADMIN — AMIODARONE HYDROCHLORIDE 0.5 MG/MIN: 50 INJECTION, SOLUTION INTRAVENOUS at 18:40

## 2018-09-20 RX ADMIN — OXYCODONE HYDROCHLORIDE 5 MG: 5 SOLUTION ORAL at 17:50

## 2018-09-20 RX ADMIN — ACETAMINOPHEN 1000 MG: 500 TABLET, FILM COATED ORAL at 00:41

## 2018-09-20 ASSESSMENT — COGNITIVE AND FUNCTIONAL STATUS - GENERAL
MOVING FROM LYING ON BACK TO SITTING ON SIDE OF FLAT BED: A LOT
MOBILITY SCORE: 15
SUGGESTED CMS G CODE MODIFIER MOBILITY: CK
MOVING TO AND FROM BED TO CHAIR: A LOT
TURNING FROM BACK TO SIDE WHILE IN FLAT BAD: A LOT
CLIMB 3 TO 5 STEPS WITH RAILING: A LITTLE
WALKING IN HOSPITAL ROOM: A LITTLE
STANDING UP FROM CHAIR USING ARMS: A LITTLE

## 2018-09-20 ASSESSMENT — ENCOUNTER SYMPTOMS
DIZZINESS: 0
ABDOMINAL PAIN: 0
FATIGUE: 0
SHORTNESS OF BREATH: 0

## 2018-09-20 ASSESSMENT — PAIN SCALES - GENERAL
PAINLEVEL_OUTOF10: 3
PAINLEVEL_OUTOF10: 7
PAINLEVEL_OUTOF10: 3
PAINLEVEL_OUTOF10: 5
PAINLEVEL_OUTOF10: 3
PAINLEVEL_OUTOF10: 5
PAINLEVEL_OUTOF10: 5
PAINLEVEL_OUTOF10: 6
PAINLEVEL_OUTOF10: 3
PAINLEVEL_OUTOF10: 6
PAINLEVEL_OUTOF10: 3
PAINLEVEL_OUTOF10: 3
PAINLEVEL_OUTOF10: 6
PAINLEVEL_OUTOF10: 3
PAINLEVEL_OUTOF10: 0
PAINLEVEL_OUTOF10: 6
PAINLEVEL_OUTOF10: 4

## 2018-09-20 ASSESSMENT — COPD QUESTIONNAIRES
DO YOU EVER COUGH UP ANY MUCUS OR PHLEGM?: NO/ONLY WITH OCCASIONAL COLDS OR INFECTIONS
HAVE YOU SMOKED AT LEAST 100 CIGARETTES IN YOUR ENTIRE LIFE: NO/DON'T KNOW
DURING THE PAST 4 WEEKS HOW MUCH DID YOU FEEL SHORT OF BREATH: NONE/LITTLE OF THE TIME
COPD SCREENING SCORE: 2

## 2018-09-20 ASSESSMENT — GAIT ASSESSMENTS: GAIT LEVEL OF ASSIST: UNABLE TO PARTICIPATE

## 2018-09-20 NOTE — PROGRESS NOTES
ROBER Drain discontinued at this time per MD orders. Site clean, dry, and intact. No excess drainage from site noted. Anesthesia placed analgesia line remains in place and intact. Site covered with sterile gauze and tegaderm. Pt remains on strict bedrest at this time. Will continue to monitor.

## 2018-09-20 NOTE — PROGRESS NOTES
Per Neurosurgery orders, Cardiology paged x 2 to request update in patient's plan of care and potential of transferring out of ICU to floor. Awaiting return phone call.

## 2018-09-20 NOTE — PROGRESS NOTES
Spoke on phone with NIGEL Pires from Neurosurgery. Discussed patient's heart rhythm and rate. Patient remains alert and oriented x 4 and denies headache or nausea.     Bedrest orders lifted at 15:00. Pt mobilizes to EOB with PT. Tolerates well. VSS. All of this conveyed to PA.    No orders to transfer for the NOC. Will continue to monitor.

## 2018-09-20 NOTE — PROGRESS NOTES
Return call received from Dr. Howie MARTINEZ states that patient is OKAY to transfer out of [facility] from a Cardiology point of view

## 2018-09-20 NOTE — CARE PLAN
Problem: Pain Management  Goal: Pain level will decrease to patient's comfort goal  Outcome: PROGRESSING AS EXPECTED      Problem: Respiratory:  Goal: Respiratory status will improve  Outcome: PROGRESSING AS EXPECTED

## 2018-09-20 NOTE — THERAPY
Occupational Therapy Contact Note:    OT orders received, pt currently has activity bedrest orders. Will initiate OT eval when pt is able to participate in OOB activity.    Isatu Neil OTR/JANEL  Pager: [phone]

## 2018-09-20 NOTE — OP REPORT
DATE OF SERVICE:  09/19/2018    SURGEON:  Fabi Castano MD    ASSISTANT:  Mango Denton PA-C    ANESTHESIOLOGIST:  Ann Marie Renee MD    PREOPERATIVE DIAGNOSES:  1.  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  2.  Status post previous resection of brainstem schwannoma with significant   neurological deficits and swallowing problems and vocal cord paresis.  3.  Status post resection of T7 lesion 07/2013 and resection of the C3 tumor   11/2013.  4.  Soft palate lesion.  5.  Complex lesion involving the left side of the brainstem.  6.  Multiple lumbar lesions, possible cervical and lumbar region.  7.  Large left L1 lesion and S2 lesion with the L1 lesion increasing in size.    POSTOPERATIVE DIAGNOSES:  1.  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  2.  Status post previous resection of brainstem schwannoma with significant   neurological deficits and swallowing problems and vocal cord paresis.  3.  Status post resection of T7 lesion 07/2013 and resection of the C3 tumor   11/2013.  4.  Soft palate lesion.  5.  Complex lesion involving the left side of the brainstem.  6.  Multiple lumbar lesions, possible cervical and lumbar region.  7.  Large left L1 lesion and S2 lesion with the L1 lesion increasing in size.    INDICATIONS:  The patient is a somewhat complicated 62-year-old man.  He had a   resection of multiple schwannomas throughout his body, most of being on the   peripheral nerves.  He has also had a brainstem schwannoma resected as well.    The last surgery was complicated when he had the brainstem schwannoma taken   out in that by swallowing problems and dysphagia and he has a feeding tube   since.  He was left with coughing issues and swallowing after this.  He had   multiple procedures to try to improve it.  He had difficulty with clearing   secretions and an ineffective cough.  He was left with balance issues after   the surgery as well.  All this was performed at [facility] in 2017.  He was also    seen at [facility].  I operated on him for the cervical lesions and the thoracic   lesion and we did multiple followup MRI scans.  In essence, he had an L1   lesion adjacent to the pedicle, which was increasing in size and consequently   requested surgery.  He is on Xarelto.  He stopped this for a week before   surgery.  We told him not to recommence it for a week after surgery as well.    Preoperatively, he has dysphonia and also has had some speech problems.  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  There is numbness throughout the right side of the body.  He has   full strength in his lower extremities.  His MRI scans as outlined above   showed an L1 lesion that had increased in size and consequently offered him   surgery.  The risks, benefits, and alternatives outlined in the office note.    TITLE OF PROCEDURE:  1.  T12-L1 decompressive laminectomy and resection of intradural nerve sheath   tumors x2.  2.  Microscopic magnification and microdissection.  3.  CPT code 2289 - insertion of paraspinal On-Q pain catheters.  4. Modifier 22- Because there were multiple tumors to remove this took >50% of time and effort than if just one tumor was present.    OPERATIVE FINDINGS:  When I opened the dura at T12-L1, there were indeed 2   tumors.  There was a smaller one that was approximately 8x5 mm.  Again, this   was arising off a nerve root.  I stimulated, it appeared to be a subcostal   nerve root.  There was a deeper larger one that was approximately 12x8 mm.    Again, with piecemeal resection, it was resected.  I tried to leave the   apparent nerve root fascicles intact.  We sent all of the tumors away for   formalin and there was no frozen section.  There were no complications.    OPERATIVE DETAILS:  After fully informed consent, the patient was brought to   the operating room at [facility].  General anesthesia was   administered.  The patient  was given intravenous antibiotic and dexamethasone   and a Oneil catheter was placed.  He was carefully turned prone on the OSI   operating table with the hip, chest, and thigh.  We used AP and lateral   fluoroscopy to localize the left L1 pedicle, which was essentially where this   tumor was.  After prepping and draping in a standard fashion, an 8 cm incision   was made over the T12-L1 interspace.  Bilateral subperiosteal exposure was   effected.  Repeat x-ray was taken for confirmation of level.  Laminectomy was   then effected using Leksell rongeurs initially, then an AM-8 drill bit to thin   down the lamina and then 2 mm Kerrison punches.  I went pedicle to pedicle   and made sure to left at least 5 mm pars on either side.  Once the laminectomy   was done, I placed Gelfoam in the gutters.  I then brought the intraoperative   ultrasound and this confirmed the location of the tumors.    Microscope was brought in the field of view.  Under magnification   illumination, I opened the dura.  I managed to keep the arachnoid intact.    This was bulging out.  I placed 4-0 Nurolon to tack up the dura on either   side.  Under the microscope, I opened the arachnoid and tented this up toward   the side.  Initially, I identified the smaller tumor.  Using microsurgical   technique and Rhoton micro instruments, I dissected this off the nerve roots.    We stimulated it.  This was an abdominal nerve.  I resected this off the   nerve root, leaving the fascicles intact.  I then went deeper and found the   larger tumor and again this had a nerve root coming into one side and going on   the other side.  Again, I shaved this off the nerve root and did a piecemeal   resection.  There was still a little bit of residual tumor left at nubbins on   both locations, but significantly he has multiple tumors throughout his lumbar   spine and I felt this would probably take 95% of the tumor in both of these   locations.  The tumor was sent for  paraffin sectioning with formalin.    Hemostasis obtained.  The dura was then closed with 6-0 Creighton-Laron.  Adherus   green glue was placed over the dura.  A suction subfascial Hemovac attached to   a Oj-Tijerina bulb was then placed.  The wound was then closed using   multiple interrupted Vicryl sutures with 2-0 vertical mattress nylon sutures.    Two paraspinal On-Q pain catheters were placed in the paraspinal musculature   with 0.2% ropivacaine infusions.    Dressing was applied.    COMPLICATIONS:  Nil.    ESTIMATED BLOOD LOSS:  100 mL.    The surgery went well.  We will how he progresses.  We will leave him on bed   rest for the next 24 hours.       ____________________________________     MD JEREMIAH BURGOS / DWIGHT    DD:  09/19/2018 15:51:31  DT:  09/19/2018 17:32:56    D#:  [id number]  Job#:  [id number]    cc: LOUIS LLANOS MD, LIVIA ZARAGOZA MD, MEHDI PARKS MD, KRYSTAL HUGHES DO

## 2018-09-20 NOTE — THERAPY
"Physical Therapy Evaluation completed.   Bed Mobility:  Supine to Sit: Minimal Assist  Transfers: Sit to Stand: Minimal Assist  Gait: Level Of Assist: Unable to Participate; sidestepping/pre-gait only; noted this was pt's 1st attempt at standing/OOB activity and pt had small dural tear and now off bedrest per nsg/chart       Plan of Care: Will benefit from Physical Therapy 3 times per week  Discharge Recommendations: Equipment: Will Continue to Assess for Equipment Needs. See below    Pt presents to PT with impaired functional strength, balance, endurance and general locomotion s/p lumbar decompression and tumor resection. He was able to demonstrate bed mobility with min/mod A, sit<>stands with min A, and sidestepping with min A/CGA. He is generally guarded with mobility, though noted that this was pt's 1st attempt at upright/OOB activity. He will benefit from continued skilled acute PT while here and in current condition pt would require continued skilled PT/placement prior to medical dc to home. However he may begin to progress well with increased OOB activity with continued practice with both PT and staff. WIll continue to visit.     See \"Rehab Therapy-Acute\" Patient Summary Report for complete documentation.     "

## 2018-09-20 NOTE — DISCHARGE PLANNING
Aware of PMR referral from Pranay Pelletier PA-C. POD #1 T12-L1 decompressive laminectomy and resection for intradual extramedullary tumor. Found to be in afib w/RVR at time of surgery. Cardiology consulted. Would appreciate initial therapy evals - as medically appropriate - to assist with determination for post acute needs. No Physiatry consult ordered per protocol at this time. TCC following.

## 2018-09-20 NOTE — PROGRESS NOTES
Cardiology Follow Up Progress Note    Date of Service  9/20/2018    Attending Physician  Fabi Castano M.D.    Chief Complaint   Atrial fibrillation    HPI  Chevy Angeles is a 62 y.o. male admitted for back surgery and found to be in atrial fibrillation.    Interim Events  Patient in the ICU.  Heart rate is improved.  Feels well.  States that he is always in atrial fibrillation as he has been told by his cardiologist.    Review of Systems  Review of Systems   Constitutional: Negative for fatigue.   Respiratory: Negative for shortness of breath.    Cardiovascular: Negative for chest pain.   Gastrointestinal: Negative for abdominal pain.   Neurological: Negative for dizziness.   All other systems reviewed and are negative.      Vital signs in last 24 hours  Temp:  [36.1 °C (97 °F)-36.9 °C (98.4 °F)] 36.2 °C (97.1 °F)  Pulse:  [] 60  Resp:  [9-34] 23  BP: (94)/(59) 94/59    Physical Exam  Physical Exam   Constitutional: He is oriented to person, place, and time. No distress.   Cardiovascular: Normal rate.  An irregularly irregular rhythm present. Exam reveals no gallop and no friction rub.    No murmur heard.  Pulmonary/Chest: Effort normal and breath sounds normal. He has no wheezes. He has no rales.   Abdominal: Soft. There is no tenderness.   Musculoskeletal: He exhibits no edema.   Neurological: He is alert and oriented to person, place, and time.   Skin: He is not diaphoretic.   Nursing note and vitals reviewed.      Lab Review  Lab Results   Component Value Date/Time    WBC 15.6 (H) 09/20/2018 03:28 AM    RBC 4.54 (L) 09/20/2018 03:28 AM    HEMOGLOBIN 15.0 09/20/2018 03:28 AM    HEMATOCRIT 45.8 09/20/2018 03:28 AM    .9 (H) 09/20/2018 03:28 AM    MCH 33.0 09/20/2018 03:28 AM    MCHC 32.8 (L) 09/20/2018 03:28 AM    MPV 12.1 09/20/2018 03:28 AM      Lab Results   Component Value Date/Time    SODIUM 134 (L) 09/20/2018 03:28 AM    POTASSIUM 5.5 09/20/2018 03:28 AM    CHLORIDE 104 09/20/2018  03:28 AM    CO2 25 09/20/2018 03:28 AM    GLUCOSE 199 (H) 09/20/2018 03:28 AM    BUN 20 09/20/2018 03:28 AM    CREATININE 0.79 09/20/2018 03:28 AM      Lab Results   Component Value Date/Time    ASTSGOT 12 11/16/2013 05:20 AM    ALTSGPT 13 11/16/2013 05:20 AM     No results found for: CHOLSTRLTOT, LDL, HDL, TRIGLYCERIDE, TROPONINI        Labs reviewed.  Normal creatinine.    Cardiac Imaging and Procedures Review  Telemetry reviewed.  Atrial fibrillation now rate controlled, mostly in the 90s.    Assessment/Plan    Atrial fibrillation: Now rate controlled.  Continue amiodarone drip.  If patient continues to be in atrial fibrillation tomorrow, will need to discuss anticoagulation with the surgical team.  He is not on any anticoagulation at this time given his recent surgery.  Continue digoxin at current dose.  He is on a higher than usual dose of digoxin.  Will plan to get digoxin level tomorrow.    Thank you for allowing me to participate in the care of this patient.  I will continue to follow this patient    Please contact me with any questions.    Celia Rocha M.D.   Cardiologist, [facility]

## 2018-09-20 NOTE — PROGRESS NOTES
12 hour chart check    Patient in Afib rates 160s on arrival, transitioned through shift to 60-90s and irregular.  QRS complex 0.08

## 2018-09-20 NOTE — DIETARY
"Nutrition Support Assessment     Day 1 of admit.  Chevy Angeles is a 62 y.o. 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  · Pt in CICU post-op secondary to A-fib/RVR; pt now stable.       Assessment:  Estimated Nutritional Needs based on:   Height: 198.1 cm (6' 6\")  Weight: 90.1 kg (198 lb 10.2 oz)  Ideal Body Weight: 97.1 kg (214 lb)  Percent Ideal Body Weight: 92.8  Body mass index is 22.95 kg/m².     Calculation/Equation: REE per MSJ x1.2 = 2209 kcal/day  Total Calories / day: 2250 - 2700 (Calories / k - 30)  Total Grams Protein / day: 108 - 135 (Grams Protein / k.2 - 1.5)  25-30 ml free water/kg = 0587-1082 ml/day     Evaluation (all based on D/w pt):   1. Pt with baseline dysphagia, G-tube since . Pt used to F/u with SLP, but no longer necessary.  2. Pt typically consumes 50% of nutrition via PO diet and boluses other 50% via G-tube.  3. Pt can tolerate regular foods, but selects what he eats based on ease of swallowing. Pt says \"eating is a chore\", and the only reason he takes PO is \"to control phlegm\".    4. Home regimen: Bolus 1 container (237 ml each) Boost Very High Calorie (VHC) via 60 ml syringe @ breakfast, lunch, and dinner = 1590 kcal, 66 grams protein, and 476 ml free water per day. Plus 3 regular meals per day via PO route, totaling ~5593-2646 kcal/day. Pt eats fiber @ each meal. Nearly all of pt's fluid intake is via G-tube (~1800 ml per day).  5. Pt's Cardiologist recently suggested weight gain, so pt has been adding an extra bolus of 1 Boost VHC Q 2-3 days. Pt says his weight has slightly increased with this regimen and is stable.   6. Now S/p back surgery, pt does not want to cough, so he wishes to receive 100% of nutrition via G-tube. Pt requesting 6 containers Boost " VHC per day. Pt would also like supplemental fiber since he is not getting it from PO diet.   7. IVF @ 30 ml/hr. Plan pt to self-administer fluid flushes. Pt gauges his hydration based on lip dryness/cracking.   8. Pt lives alone, so plan D/c to Rehab for 1-2 weeks. Explained to pt that he can speak with RD @ Rehab about resuming home TF/PO regimen when he is ready.      Recommendations/Plan:  1. Boost VHC x6 containers per day = 3180 kcal, 132 grams protein, and 953 ml free water per day.  2. 6 packets Nutrisource Fiber per day = 18 grams finer per day (90 kcal).  3. Fluids per MD. Recommend free water flushes via G-tube to total ~1300 ml/day (pt aware).  4. Resume home TF/PO regimen when appropriate.    RD following.

## 2018-09-20 NOTE — OP REPORT
DATE OF SERVICE:  09/19/2018    SURGEON:  Fabi Castano MD    ASSISTANT:  Mango Denton PA-C    ANESTHESIOLOGIST:  Ann Marie Renee MD    PREOPERATIVE DIAGNOSES:  1.  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  2.  Status post previous resection of brainstem schwannoma with significant   neurological deficits and swallowing problems and vocal cord paresis.  3.  Status post resection of T7 lesion 07/2013 and resection of the C3 tumor   11/2013.  4.  Soft palate lesion.  5.  Complex lesion involving the left side of the brainstem.  6.  Multiple lumbar lesions, possible cervical and lumbar region.  7.  Large left L1 lesion and S2 lesion with the L1 lesion increasing in size.    POSTOPERATIVE DIAGNOSES:  1.  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  2.  Status post previous resection of brainstem schwannoma with significant   neurological deficits and swallowing problems and vocal cord paresis.  3.  Status post resection of T7 lesion 07/2013 and resection of the C3 tumor   11/2013.  4.  Soft palate lesion.  5.  Complex lesion involving the left side of the brainstem.  6.  Multiple lumbar lesions, possible cervical and lumbar region.  7.  Large left L1 lesion and S2 lesion with the L1 lesion increasing in size.    INDICATIONS:  The patient is a somewhat complicated 62-year-old man.  He has a   resection of multiple schwannomas throughout his body, most of being on the   peripheral nerves.  He has also had a brainstem schwannoma resected as well.    The last surgery was complicated when he had the brainstem schwannoma taken   out in that by swallowing problems and dysphagia and he has a feeding tube   since.  He was left with coughing issues and swallowing after this.  He had   multiple procedures to try to improve it.  He had difficulty with clearing   secretions and an ineffective cough.  He was left with balance issues after   the surgery as well.  All this was performed at [facility] in 2017.  He was also   seen  at [facility].  I operated on him for the cervical lesions and the thoracic   lesion and we did multiple followup MRI scans.  In essence, he had an L1   lesion adjacent to the pedicle, which was increasing in size and consequently   requested surgery.  He is on Xarelto.  He stopped this for a week before   surgery.  He told him not to recommence it for a week after surgery as well.    Preoperatively, he has dysphonia and also has had some speech problems.  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  There is numbness throughout the right side of the body.  He has   full strength in his lower extremities.  His MRI scans as outlined above   showed an L1 lesion that had increased in size and consequently offered him   surgery.  The risks, benefits, and alternatives outlined in the office note.    TITLE OF PROCEDURE:  1.  T12-L1 decompressive laminectomy and resection of intradural nerve sheath   tumors x2.  2.  Microscopic magnification and microdissection.  3.  CPT code 2289 -- insertion of paraspinal On-Q pain catheters.    OPERATIVE FINDINGS:  When I opened the dura at T12-L1, there were indeed 2   tumors.  There was a smaller one that was approximately 8x5 mm.  Again, this   was arising off a nerve root.  I stimulated, it appeared to be a subcostal   nerve root.  There was a deeper larger one that was approximately 12x8 mm.    Again, a piecemeal resection was resected try to relieve the apparent nerve   root fascicles intact.  We sent all of the tumors away for formalin and there   was no frozen section.  There were no complications.    OPERATIVE DETAILS:  After fully informed consent, the patient was brought to   the operating room at [facility].  General anesthesia was   administered.  The patient was given intravenous antibiotic and dexamethasone   and a Oneil catheter was placed.  He was carefully turned prone on the OSI   operating table  with the hip, chest, and thigh.  We used AP and lateral   fluoroscopy to localize the left L1 pedicle, which was essentially where this   tumor was.  After prepping and draping in a standard fashion, an 8 cm incision   was made over the T12-L1 interspace.  Bilateral subperiosteal exposure was   affected.  Repeat x-ray was taken for confirmation of level.  Laminectomy was   then affected using Leksell rongeurs initially, then an AM-8 drill bit to thin   down the lamina and then 2 mm Kerrison punches.  I went pedicle to pedicle   and made sure to left at least 5 mm pars on either side.  Once the laminectomy   was done, I placed Gelfoam in the gutters.  I then brought the intraoperative   ultrasound and this confirmed the location of the tumors.    Microscope was brought in the field of view.  Under magnification   illumination, I opened the dura.  I managed to keep the arachnoid intact.    This was bulging out.  I placed 4-0 Nurolon to tack up the dura on either   side.  Under the microscope, I opened the arachnoid and tented this up toward   the side.  Initially, I identified the smaller tumor.  Using microsurgical   technique and ____ micro instruments, I dissected this off the nerve roots.    We stimulated it.  This was an abdominal nerve.  I resected this off the nerve   root leaving the fascicles intact.  I then went deeper and found the larger   tumor and again this had a nerve root coming into one side and going on the   other side.  Again, I shave this off the nerve root and did a piecemeal   resection.  There was still a little bit of residual tumor left at nubbins on   both locations, but significantly he has multiple tumors throughout his lumbar   spine and I felt this would probably take 95% of the tumor in both of these   locations.  The tumor was sent for paraffin sectioning with formalin.    Hemostasis obtained.  The dura was then closed with 6-0 Humble-Laron.  Adherus   green glue was placed over the dura.   A suction subfascial Hemovac attached to   a Oj-Tijerina bulb was then placed.  The wound was then closed using   multiple interrupted Vicryl sutures with 2-0 vertical mattress nylon sutures.    Two paraspinal On-Q pain catheters were placed in the paraspinal musculature   with 0.2% ropivacaine infusions.    Dressing was applied.    COMPLICATIONS:  Nil.    ESTIMATED BLOOD LOSS:  100 mL.    The surgery went well.  We will how he progresses.  We will leave him on bed   rest for the next 24 hours.       ____________________________________     MD JEREMIAH BURGOS / NTS    DD:  09/19/2018 15:51:31  DT:  09/19/2018 17:32:56    D#:  [id number]  Job#:  [id number]    cc: LOUIS LLANOS MD, MEHDI PARKS MD, KRYSTAL HUGHES DO, _____ _____

## 2018-09-20 NOTE — PROGRESS NOTES
Neurosurgery Progress Note    Subjective:  POD #1 seen with Dr. Castano  HOB was elevated a few degrees  Pain controlled  Oneil placed  Receiving tube feeds  Heart Rate controlled-on amiodarone drip    Exam:  LE motor 5/5 throughout bilaterally  ROBER Drain 60cc  Labs stable- K+ 5.5  VSS    BP  Min: 94/59  Max: 116/85  Pulse  Av.9  Min: 51  Max: 163  Resp  Av  Min: 14  Max: 34  Temp  Av.6 °C (97.9 °F)  Min: 36.1 °C (97 °F)  Max: 37.1 °C (98.7 °F)  SpO2  Av.7 %  Min: 89 %  Max: 100 %    No Data Recorded    Recent Labs      18   1632  18   0328   WBC  10.5  15.6*   RBC  4.79  4.54*   HEMOGLOBIN  16.0  15.0   HEMATOCRIT  48.5  45.8   MCV  101.3*  100.9*   MCH  33.4*  33.0   MCHC  33.0*  32.8*   RDW  52.8*  53.2*   PLATELETCT  181  204   MPV  11.6  12.1     Recent Labs      18   1614  18   1632  18   0328   SODIUM  140  138  134*   POTASSIUM  3.9  4.6  5.5   CHLORIDE  101  106  104   CO2  32  24  25   GLUCOSE  124*  121*  199*   BUN  20  20  20   CREATININE  0.91  0.81  0.79   CALCIUM  9.7  8.7  8.3*     Recent Labs      18   1614  18   0328   APTT  27.5  29.0   INR  1.13  1.23*           Intake/Output       18 0700 - 18 0659 18 0700 - 18 0659      5688-2480 9242-7740 Total [id number] Total       Intake    I.V.    1674.1 3624.1  --  -- --    Crystalloid Intake 1950-  -- -- --    PCA End of Shift Total Volume (ml) -- 39.8 39.8 -- -- --    Amiodarone Volume -- 264 264 -- -- --    Volume (mL) (lactated ringers infusion) -- 200 200 -- -- --    Volume (mL) (D5W infusion) -- 267 267 -- -- --    Volume (mL) (0.9 % NaCl with KCl 20 mEq infusion) -- 903.3 903.3 -- -- --    Other  --  360 360  --  -- --    Medications (PO/Enteral Liquids) -- 360 360 -- -- --    NG/GT  --  700 700  --  -- --    Intake (mL) (Enteral Tube PEG: Percutaneous endoscopic gastrostomy Abdomen) -- 700 700 -- -- --    IV Piggyback  --  100 100  --  -- --     Volume (mL) (ceFAZolin (ANCEF) IVPB 2 g) -- 100 100 -- -- --    Total Intake 1950 2834.1 4784.1 -- -- --       Output    Urine  150  630 780  --  -- --    Output (mL) (Urethral Catheter Latex 14 Fr.) 150 630 780 -- -- --    Drains  40  60 100  --  -- --    Output (mL) (Closed/Suction Drain Posterior Back Oj Tijerina) 40 60 100 -- -- --    Residual Amount (mL) (Discarded) (Enteral Tube PEG: Percutaneous endoscopic gastrostomy Abdomen) -- 0 0 -- -- --    Stool  --  0 0  --  -- --    Number of Times Stooled -- 0 x 0 x -- -- --    Measurable Stool (mL) -- 0 0 -- -- --    Emesis/NG output  --  0 0  --  -- --    Output (mL) (Enteral Tube PEG: Percutaneous endoscopic gastrostomy Abdomen) -- 0 0 -- -- --    Blood  100  -- 100  --  -- --    Est. Blood Loss (mL) 100 -- 100 -- -- --    Total Output 290 690 980 -- -- --       Net I/O     1660 2144.1 3804.1 -- -- --            Intake/Output Summary (Last 24 hours) at 09/20/18 0758  Last data filed at 09/20/18 0600   Gross per 24 hour   Intake          4784.08 ml   Output              980 ml   Net          3804.08 ml            • digoxin  250 mcg DAILY   • omeprazole 2 mg/mL in sodium bicarbonate  40 mg DAILY   • Pharmacy Consult Request  1 Each PRN   • MD ALERT...DO NOT ADMINISTER NSAIDS or ASPIRIN unless ORDERED By Neurosurgery  1 Each PRN   • senna-docusate  1 Tab Nightly   • senna-docusate  1 Tab Q24HRS PRN   • polyethylene glycol/lytes  1 Packet BID PRN   • magnesium hydroxide  30 mL QDAY PRN   • bisacodyl  10 mg Q24HRS PRN   • fleet  1 Each Once PRN   • Respiratory Care per Protocol   Continuous RT   • 0.9 % NaCl with KCl 20 mEq 1,000 mL   Continuous   • acetaminophen  1,000 mg Q6HRS   • morphine   Continuous   • morphine injection  2 mg Once PRN   • diphenhydrAMINE  25 mg Q6HRS PRN    Or   • diphenhydrAMINE  25 mg Q6HRS PRN   • ondansetron  4 mg Q4HRS PRN   • ondansetron  4 mg Q4HRS PRN   • promethazine  12.5-25 mg Q4HRS PRN   • promethazine  12.5-25 mg Q4HRS PRN   •  prochlorperazine  5-10 mg Q4HRS PRN   • methocarbamol  750 mg Q8HRS PRN   • ALPRAZolam  0.25 mg BID PRN   • labetalol  10 mg Q HOUR PRN   • hydrALAZINE  10 mg Q HOUR PRN   • LR   Continuous   • dexamethasone  4 mg Q6HRS   • D5W   Continuous   • amiodarone infusion  0.5-1 mg/min Continuous   • digoxin  250 mcg Once   • On-Q Pump - Ropivacaine 270 mL (fixed-flow rate, dual-lumen)   Continuous       Assessment and Plan:  Hospital day #2  POD #1  Prophylactic anticoagulation: no    Plan:  1. D/C PCA  2. ROBER Drain off of suction now.  3. D/C ROBER Drain at 1300hrs  4. Off of bed rest at 1500hrs. Slowly sit up and mobilize at 1500hrs   5. PT/OT therapies ok after 1500hrs  6. D/C Oneil when mobilizing   7. 0.9% NS for fluids  8. Continue tube feeds  9. Ok to t/f to floor if cleared by cardiology

## 2018-09-20 NOTE — THERAPY
consult received; s/p lumbar decompression and tumor excision; bedrest until 3PM today then slowly HOB elevated to see response 2' to dural tear; eval likely tomorrow AM if tolerating upright activity this evening

## 2018-09-20 NOTE — CARE PLAN
Problem: Communication  Goal: The ability to communicate needs accurately and effectively will improve  Outcome: PROGRESSING AS EXPECTED  Pt alert and oriented x 4. Able to make needs known  Intervention: [place] patient and significant other/support system to call light to alert staff of needs  Pt uses call light appropriately and effectively       Problem: Skin Integrity  Goal: Risk for impaired skin integrity will decrease  Outcome: PROGRESSING AS EXPECTED  Skin intact. Surgical incision clean dry and intact. Dietary consulted to promote optimal nutritional intake

## 2018-09-21 ENCOUNTER — APPOINTMENT (OUTPATIENT)
Dept: CARDIOLOGY | Facility: MEDICAL CENTER | Age: 63
DRG: 029 | End: 2018-09-21
Attending: INTERNAL MEDICINE
Payer: COMMERCIAL

## 2018-09-21 PROBLEM — K94.29 GASTRIC TUBE GRANULATION TISSUE (HCC): Status: ACTIVE | Noted: 2018-09-21

## 2018-09-21 PROBLEM — Z98.890 S/P LAMINECTOMY: Status: ACTIVE | Noted: 2018-09-21

## 2018-09-21 LAB
ANION GAP SERPL CALC-SCNC: 7 MMOL/L (ref 0–11.9)
APTT PPP: 26.9 SEC (ref 24.7–36)
BUN SERPL-MCNC: 19 MG/DL (ref 8–22)
CALCIUM SERPL-MCNC: 8.5 MG/DL (ref 8.5–10.5)
CHLORIDE SERPL-SCNC: 100 MMOL/L (ref 96–112)
CO2 SERPL-SCNC: 29 MMOL/L (ref 20–33)
CREAT SERPL-MCNC: 0.81 MG/DL (ref 0.5–1.4)
DIGOXIN SERPL-MCNC: 0.6 NG/ML (ref 0.8–2)
ERYTHROCYTE [DISTWIDTH] IN BLOOD BY AUTOMATED COUNT: 52.4 FL (ref 35.9–50)
GLUCOSE SERPL-MCNC: 128 MG/DL (ref 65–99)
HCT VFR BLD AUTO: 44.9 % (ref 42–52)
HGB BLD-MCNC: 14.9 G/DL (ref 14–18)
INR PPP: 1.19 (ref 0.87–1.13)
LV EJECT FRACT  99904: 40
LV EJECT FRACT MOD 2C 99903: 48.92
LV EJECT FRACT MOD 4C 99902: 34.28
LV EJECT FRACT MOD BP 99901: 40.79
MCH RBC QN AUTO: 33.5 PG (ref 27–33)
MCHC RBC AUTO-ENTMCNC: 33.2 G/DL (ref 33.7–35.3)
MCV RBC AUTO: 100.9 FL (ref 81.4–97.8)
PLATELET # BLD AUTO: 192 K/UL (ref 164–446)
PMV BLD AUTO: 12.3 FL (ref 9–12.9)
POTASSIUM SERPL-SCNC: 4.2 MMOL/L (ref 3.6–5.5)
PROTHROMBIN TIME: 15.2 SEC (ref 12–14.6)
RBC # BLD AUTO: 4.45 M/UL (ref 4.7–6.1)
SODIUM SERPL-SCNC: 136 MMOL/L (ref 135–145)
WBC # BLD AUTO: 28.3 K/UL (ref 4.8–10.8)

## 2018-09-21 PROCEDURE — 85610 PROTHROMBIN TIME: CPT

## 2018-09-21 PROCEDURE — 700102 HCHG RX REV CODE 250 W/ 637 OVERRIDE(OP): Performed by: NEUROLOGICAL SURGERY

## 2018-09-21 PROCEDURE — 80048 BASIC METABOLIC PNL TOTAL CA: CPT

## 2018-09-21 PROCEDURE — 51798 US URINE CAPACITY MEASURE: CPT

## 2018-09-21 PROCEDURE — G8987 SELF CARE CURRENT STATUS: HCPCS | Mod: CK

## 2018-09-21 PROCEDURE — 94660 CPAP INITIATION&MGMT: CPT

## 2018-09-21 PROCEDURE — G8988 SELF CARE GOAL STATUS: HCPCS | Mod: CI

## 2018-09-21 PROCEDURE — 770001 HCHG ROOM/CARE - MED/SURG/GYN PRIV*

## 2018-09-21 PROCEDURE — 99233 SBSQ HOSP IP/OBS HIGH 50: CPT | Performed by: INTERNAL MEDICINE

## 2018-09-21 PROCEDURE — 700102 HCHG RX REV CODE 250 W/ 637 OVERRIDE(OP): Performed by: PHYSICIAN ASSISTANT

## 2018-09-21 PROCEDURE — 80162 ASSAY OF DIGOXIN TOTAL: CPT

## 2018-09-21 PROCEDURE — 97166 OT EVAL MOD COMPLEX 45 MIN: CPT

## 2018-09-21 PROCEDURE — 85027 COMPLETE CBC AUTOMATED: CPT

## 2018-09-21 PROCEDURE — A9270 NON-COVERED ITEM OR SERVICE: HCPCS | Performed by: PHYSICIAN ASSISTANT

## 2018-09-21 PROCEDURE — 93306 TTE W/DOPPLER COMPLETE: CPT

## 2018-09-21 PROCEDURE — 85730 THROMBOPLASTIN TIME PARTIAL: CPT

## 2018-09-21 PROCEDURE — A9270 NON-COVERED ITEM OR SERVICE: HCPCS | Performed by: NEUROLOGICAL SURGERY

## 2018-09-21 RX ADMIN — POLYETHYLENE GLYCOL 3350 1 PACKET: 17 POWDER, FOR SOLUTION ORAL at 16:52

## 2018-09-21 RX ADMIN — ACETAMINOPHEN 500 MG: 500 TABLET, FILM COATED ORAL at 16:57

## 2018-09-21 RX ADMIN — DIGOXIN 250 MCG: 250 TABLET ORAL at 04:30

## 2018-09-21 RX ADMIN — OXYCODONE HYDROCHLORIDE 5 MG: 5 SOLUTION ORAL at 19:49

## 2018-09-21 RX ADMIN — OMEPRAZOLE 40 MG: 20 CAPSULE, DELAYED RELEASE ORAL at 04:30

## 2018-09-21 RX ADMIN — ACETAMINOPHEN 1000 MG: 500 TABLET, FILM COATED ORAL at 12:06

## 2018-09-21 RX ADMIN — STANDARDIZED SENNA CONCENTRATE AND DOCUSATE SODIUM 1 TABLET: 8.6; 5 TABLET ORAL at 21:54

## 2018-09-21 RX ADMIN — ACETAMINOPHEN 1000 MG: 500 TABLET, FILM COATED ORAL at 07:51

## 2018-09-21 RX ADMIN — STANDARDIZED SENNA CONCENTRATE AND DOCUSATE SODIUM 1 TABLET: 8.6; 5 TABLET ORAL at 19:52

## 2018-09-21 RX ADMIN — ACETAMINOPHEN 1000 MG: 500 TABLET, FILM COATED ORAL at 00:13

## 2018-09-21 ASSESSMENT — ENCOUNTER SYMPTOMS
COUGH: 0
BACK PAIN: 1
FEVER: 0
ABDOMINAL DISTENTION: 0
DIZZINESS: 0
FATIGUE: 0
SHORTNESS OF BREATH: 0
ABDOMINAL PAIN: 0
WHEEZING: 0
CHILLS: 0
HEADACHES: 0

## 2018-09-21 ASSESSMENT — PAIN SCALES - GENERAL
PAINLEVEL_OUTOF10: 2
PAINLEVEL_OUTOF10: 2
PAINLEVEL_OUTOF10: 0
PAINLEVEL_OUTOF10: 6
PAINLEVEL_OUTOF10: 2
PAINLEVEL_OUTOF10: 0
PAINLEVEL_OUTOF10: 2
PAINLEVEL_OUTOF10: 0
PAINLEVEL_OUTOF10: 1

## 2018-09-21 ASSESSMENT — COGNITIVE AND FUNCTIONAL STATUS - GENERAL
EATING MEALS: A LITTLE
PERSONAL GROOMING: A LITTLE
SUGGESTED CMS G CODE MODIFIER DAILY ACTIVITY: CK
DRESSING REGULAR LOWER BODY CLOTHING: A LOT
HELP NEEDED FOR BATHING: A LITTLE
TOILETING: A LITTLE
DRESSING REGULAR UPPER BODY CLOTHING: A LITTLE
DAILY ACTIVITIY SCORE: 17

## 2018-09-21 ASSESSMENT — ACTIVITIES OF DAILY LIVING (ADL): TOILETING: INDEPENDENT

## 2018-09-21 NOTE — PROGRESS NOTES
12 Hour Chart Check     A Fib   Rate Controlled   HR 70's - 80's  Maintains on amio drip at 0.5   - / 0.08 / -

## 2018-09-21 NOTE — PROGRESS NOTES
Cardiology Follow Up Progress Note    Date of Service  9/21/2018    Attending Physician  Fabi Castano M.D.    Chief Complaint   Atrial fibrillation    HPI  Chevy Angeles is a 62 y.o. male admitted for back surgery and found to be in atrial fibrillation.    Interim Events  Overnight had rapid ventricular response.  Feels okay this morning.    Reports that he has been told by Dr. Gasca at [facility] that he has chronic atrial fibrillation.      Review of Systems  Review of Systems   Constitutional: Negative for fatigue.   Respiratory: Negative for shortness of breath.    Cardiovascular: Negative for chest pain.   Gastrointestinal: Negative for abdominal pain.   Neurological: Negative for dizziness.   All other systems reviewed and are negative.      Vital signs in last 24 hours  Temp:  [36.2 °C (97.1 °F)-36.3 °C (97.3 °F)] (P) 36.2 °C (97.2 °F)  Pulse:  [51-98] 71  Resp:  [12-31] (P) 21    Physical Exam  Physical Exam   Constitutional: He is oriented to person, place, and time. No distress.   Cardiovascular: Normal rate.  An irregularly irregular rhythm present. Exam reveals no gallop and no friction rub.    No murmur heard.  Pulmonary/Chest: Effort normal and breath sounds normal. He has no wheezes. He has no rales.   Abdominal: Soft. There is no tenderness.   Musculoskeletal: He exhibits no edema.   Neurological: He is alert and oriented to person, place, and time.   Skin: He is not diaphoretic.   Nursing note and vitals reviewed.  No change in exam from yesterday, 9/20/2018.    Lab Review  Lab Results   Component Value Date/Time    WBC 28.3 (H) 09/21/2018 04:26 AM    RBC 4.45 (L) 09/21/2018 04:26 AM    HEMOGLOBIN 14.9 09/21/2018 04:26 AM    HEMATOCRIT 44.9 09/21/2018 04:26 AM    .9 (H) 09/21/2018 04:26 AM    MCH 33.5 (H) 09/21/2018 04:26 AM    MCHC 33.2 (L) 09/21/2018 04:26 AM    MPV 12.3 09/21/2018 04:26 AM      Lab Results   Component Value Date/Time    SODIUM 136 09/21/2018  04:26 AM    POTASSIUM 4.2 09/21/2018 04:26 AM    CHLORIDE 100 09/21/2018 04:26 AM    CO2 29 09/21/2018 04:26 AM    GLUCOSE 128 (H) 09/21/2018 04:26 AM    BUN 19 09/21/2018 04:26 AM    CREATININE 0.81 09/21/2018 04:26 AM      Lab Results   Component Value Date/Time    ASTSGOT 12 11/16/2013 05:20 AM    ALTSGPT 13 11/16/2013 05:20 AM     Labs reviewed.  Normal hemoglobin.    Cardiac Imaging and Procedures Review  Telemetry reviewed.  Patient had rapid ventricular response overnight in the 180s beats per minute for 2 hours.  Currently rate controlled.      Assessment/Plan    Atrial fibrillation: In rapid ventricular response overnight. Patient reports having chronic atrial fibrillation.  We will request records from Dr. Gasca's office to confirm this further.  I personally discussed with neurosurgery, Dr. Castano who recommends not initiating anticoagulation for at least 2 weeks post surgery.  Since the patient cannot be anticoagulated and he is 48 hours from A. fib onset (based on anesthesiologist report), would recommend against continuation of amiodarone at this time.  Continue digoxin at current dose.  He is on a higher than usual dose of digoxin given his body weight.  I will check a digoxin level on him today.  I will also start him on metoprolol 25 mg twice daily.      Thank you for allowing me to participate in the care of this patient.  I will continue to follow this patient    Please contact me with any questions.    Celia Rocha M.D.   Cardiologist, [facility]

## 2018-09-21 NOTE — CARE PLAN
Problem: Nutritional:  Goal: Nutrition support tolerated and meeting greater than 85% of estimated needs  Outcome: MET Date Met: 09/21/18

## 2018-09-21 NOTE — PROGRESS NOTES
Pt stated having pain with urinary attempts. Bladder scan ordered and resulted in 571 cc. Straight cath performed and 500 cc removed. Will bladder scan at 0600 per protocol.

## 2018-09-21 NOTE — PROGRESS NOTES
Neurosurgery Progress Note    Subjective:  POD #2  Mobilizing in room yesterday  Pain controlled  Rashid removed yesterday, had retention, rashid replaced  Receiving tube feeds  Heart Rate controlled-on amiodarone drip  -Discussed with Dr. Rocha    Exam:  LE motor 5/5 throughout bilaterally  Labs stable- K+ 4.2  VSS  WBC 28: On dex taper  afebrile    Pulse  Av.7  Min: 51  Max: 89  Resp  Av.3  Min: 9  Max: 31  Temp  Av.2 °C (97.2 °F)  Min: 36.2 °C (97.1 °F)  Max: 36.3 °C (97.3 °F)  SpO2  Av.9 %  Min: 93 %  Max: 100 %    No Data Recorded    Recent Labs      18   1632  18   0328  18   0426   WBC  10.5  15.6*  28.3*   RBC  4.79  4.54*  4.45*   HEMOGLOBIN  16.0  15.0  14.9   HEMATOCRIT  48.5  45.8  44.9   MCV  101.3*  100.9*  100.9*   MCH  33.4*  33.0  33.5*   MCHC  33.0*  32.8*  33.2*   RDW  52.8*  53.2*  52.4*   PLATELETCT  181  204  192   MPV  11.6  12.1  12.3     Recent Labs      18   1632  18   0328  18   0426   SODIUM  138  134*  136   POTASSIUM  4.6  5.5  4.2   CHLORIDE  106  104  100   CO2  24  25  29   GLUCOSE  121*  199*  128*   BUN  20  20  19   CREATININE  0.81  0.79  0.81   CALCIUM  8.7  8.3*  8.5     Recent Labs      18   1614  18   0328  18   0426   APTT  27.5  29.0  26.9   INR  1.13  1.23*  1.19*           Intake/Output       18 07 - 18 0659 18 07 - 18 0659      6985-2526 0121-6730 Total 3412-2904 2749-6914 Total       Intake    I.V.  933.3  1764 2697.3  --  -- --    Amiodarone Volume -- 204 204 -- -- --    Volume (mL) (D5W infusion) -- 360 360 -- -- --    Volume (mL) (NS infusion) 933.3 1200 2133.3 -- -- --    Other  420  -- 420  --  -- --    Medications (PO/Enteral Liquids) 420 -- 420 -- -- --    NG/GT  --  340 340  --  -- --    Intake (mL) (Enteral Tube PEG: Percutaneous endoscopic gastrostomy Abdomen) -- 340 340 -- -- --    Total Intake 1353.3 2104 3457.3 -- -- --       Output    Urine  900  800 1700   --  -- --    Number of Times Voided -- 3 x 3 x -- -- --    Straight Catheter -- 500 500 -- -- --    Urine Void (mL) (non-catheter) -- 300 300 -- -- --    Output (mL) ([REMOVED] Urethral Catheter Latex 14 Fr.) 900 -- 900 -- -- --    Drains  0  -- 0  --  -- --    Residual Amount (mL) (Discarded) (Enteral Tube PEG: Percutaneous endoscopic gastrostomy Abdomen) 0 -- 0 -- -- --    Total Output  -- -- --       Net I/O     453.3 1304 1757.3 -- -- --            Intake/Output Summary (Last 24 hours) at 09/21/18 0901  Last data filed at 09/21/18 0600   Gross per 24 hour   Intake          3337.33 ml   Output             1600 ml   Net          1737.33 ml       $ Bladder Scan Results (mL): 480    • oxyCODONE  5 mg Q4HRS PRN   • NS   Continuous   • digoxin  250 mcg DAILY   • omeprazole 2 mg/mL in sodium bicarbonate  40 mg DAILY   • Pharmacy Consult Request  1 Each PRN   • MD ALERT...DO NOT ADMINISTER NSAIDS or ASPIRIN unless ORDERED By Neurosurgery  1 Each PRN   • senna-docusate  1 Tab Nightly   • senna-docusate  1 Tab Q24HRS PRN   • polyethylene glycol/lytes  1 Packet BID PRN   • magnesium hydroxide  30 mL QDAY PRN   • bisacodyl  10 mg Q24HRS PRN   • fleet  1 Each Once PRN   • Respiratory Care per Protocol   Continuous RT   • acetaminophen  1,000 mg Q6HRS   • morphine injection  2 mg Once PRN   • diphenhydrAMINE  25 mg Q6HRS PRN    Or   • diphenhydrAMINE  25 mg Q6HRS PRN   • ondansetron  4 mg Q4HRS PRN   • ondansetron  4 mg Q4HRS PRN   • promethazine  12.5-25 mg Q4HRS PRN   • promethazine  12.5-25 mg Q4HRS PRN   • prochlorperazine  5-10 mg Q4HRS PRN   • methocarbamol  750 mg Q8HRS PRN   • ALPRAZolam  0.25 mg BID PRN   • labetalol  10 mg Q HOUR PRN   • hydrALAZINE  10 mg Q HOUR PRN   • LR   Continuous   • D5W   Continuous   • amiodarone infusion  0.5-1 mg/min Continuous   • On-Q Pump - Ropivacaine 270 mL (fixed-flow rate, dual-lumen)   Continuous       Assessment and Plan:  Hospital day #3  POD #2  Prophylactic  anticoagulation: no    Plan:  1. Continue therapies  2. 40mg SQ lovenox  3. Ok to t/f to floor  4. Waiting rehab assessment

## 2018-09-21 NOTE — CARE PLAN
Problem: Pain Management  Goal: Pain level will decrease to patient's comfort goal  Epidural in place and running per MAR. Scheduled acetaminophen given for comfort. Repositioning provided as needed    Problem: Urinary Elimination:  Goal: Ability to reestablish a normal urinary elimination pattern will improve  Rashid inserted at 0600 due to urinary retention. Penis tender from prior straight caths. MD aware and states to maintain rashid in place for approximately 2 days before reassessment of retention. Will await discontinuation orders when cleared. Pt states he has had retention issues post surgery in the past.     Problem: Bowel/Gastric:  Goal: Normal bowel function is maintained or improved    Intervention: Educate patient and significant other/support system about diet, fluid intake, medications and activity to promote bowel function  Increased activity planned over shift, POC updated with pt regarding possible miralax in afternoon if no BM achieved.

## 2018-09-21 NOTE — PROGRESS NOTES
Critical Care Progress Note    Date of admission  9/19/2018    Hospital Course  62 y.o. 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  He has a history of chronic Carlos of atrial fibrillation which is usually controlled with digoxin.  On the day of surgery he vomited up his dose however had A. fib with RVR requiring amiodarone infusion and stabilization so was admitted to the ICU.  Patient usually takes a NOAC which was held secondary to his planned surgery.  Currently patient feels fine without complaints of dizziness lightheadedness chest pain or shortness of breath.  Chronically patient has vocal cord dysfunction and difficulty swallowing secondary to prior schwannoma resection.     Interval Problem Update  Reviewed last 24 hour events:  -aflutter  -amio  -epidural  -rashid for retention  -respiratory  -ppi   - vte  -dc amio  -tylenol around the clock    Review of Systems  Review of Systems   Constitutional: Negative for chills, fatigue and fever.   Respiratory: Negative for cough, shortness of breath and wheezing.    Cardiovascular: Negative for leg swelling.   Gastrointestinal: Negative for abdominal distention and abdominal pain.   Musculoskeletal: Positive for back pain.   Neurological: Negative for headaches.        Vital Signs for last 24 hours   Temp:  [36.2 °C (97.1 °F)-36.2 °C (97.2 °F)] (P) 36.2 °C (97.2 °F)  Pulse:  [51-98] 86  Resp:  [12-31] (P) 21  BP: (99)/(63) 99/63    Hemodynamic parameters for last 24 hours       Vent Settings for last 24 hours       Physical Exam   Physical Exam   Constitutional: He is oriented to person, place, and time. He appears well-developed.   HENT:   Head: Normocephalic and atraumatic.   Eyes: Pupils are equal, round, and reactive to light. Conjunctivae are normal.   Neck: Normal range of motion. Neck supple. No JVD present. No thyromegaly present.   Cardiovascular: Exam reveals no friction rub.    No murmur  heard.  Irregular irregular   Pulmonary/Chest: No respiratory distress. He has no wheezes. He has no rales.   Abdominal: He exhibits no distension. There is no tenderness. There is no rebound.   Musculoskeletal: He exhibits no edema.   Lymphadenopathy:     He has no cervical adenopathy.   Neurological: He is alert and oriented to person, place, and time. No cranial nerve deficit.   Skin: Skin is warm and dry.   Psychiatric: He has a normal mood and affect.       Medications  Current Facility-Administered Medications   Medication Dose Route Frequency Provider Last Rate Last Dose   • metoprolol (LOPRESSOR) tablet 25 mg  25 mg Oral TWICE DAILY Celia Rocha M.D.   Stopped at 09/21/18 1400   • oxyCODONE (ROXICODONE) oral solution 5 mg  5 mg Feeding Tube Q4HRS PRN CORINNA SladeAJOSE   5 mg at 09/20/18 2150   • digoxin (LANOXIN) tablet 250 mcg  250 mcg Feeding Tube DAILY Fabi Castano M.D.   250 mcg at 09/21/18 0430   • omeprazole 2 mg/mL in sodium bicarbonate (PRILOSEC) oral susp 40 mg  40 mg Per G Tube DAILY Fabi Castano M.D.   40 mg at 09/21/18 0430   • Pharmacy Consult Request ...Pain Management Review 1 Each  1 Each Other PRN Fabi Castano M.D.       • MD ALERT...DO NOT ADMINISTER NSAIDS or ASPIRIN unless ORDERED By Neurosurgery 1 Each  1 Each Other PRN Fabi Castano M.D.       • senna-docusate (PERICOLACE or SENOKOT S) 8.6-50 MG per tablet 1 Tab  1 Tab Feeding Tube Nightly Fabi Castano M.D.   1 Tab at 09/20/18 2150   • senna-docusate (PERICOLACE or SENOKOT S) 8.6-50 MG per tablet 1 Tab  1 Tab Feeding Tube Q24HRS PRN Fabi Castano M.D.       • polyethylene glycol/lytes (MIRALAX) PACKET 1 Packet  1 Packet Oral BID PRN Fabi Castano M.D.   1 Packet at 09/21/18 1652   • magnesium hydroxide (MILK OF MAGNESIA) suspension 30 mL  30 mL Feeding Tube QDAY PRN Fabi Castano M.D.       • bisacodyl (DULCOLAX) suppository 10 mg  10 mg Rectal Q24HRS PRN Fabi Castano M.D.       • fleet enema 133  mL  1 Each Rectal Once PRN Fabi Castano M.D.       • Respiratory Care per Protocol   Nebulization Continuous RT Fabi Castano M.D.       • acetaminophen (TYLENOL) tablet 1,000 mg  1,000 mg Feeding Tube Q6HRS Fabi Castano M.D.   500 mg at 09/21/18 1657   • morphine (pf) 4 mg/ml injection 2 mg  2 mg Intravenous Once PRN Fabi Castano M.D.       • diphenhydrAMINE (BENADRYL) tablet/capsule 25 mg  25 mg Oral Q6HRS PRN Fabi Castano M.D.        Or   • diphenhydrAMINE (BENADRYL) injection 25 mg  25 mg Intravenous Q6HRS PRN Fabi Castano M.D.       • ondansetron (ZOFRAN) syringe/vial injection 4 mg  4 mg Intravenous Q4HRS PRN Fabi Castano M.D.   4 mg at 09/20/18 0049   • ondansetron (ZOFRAN ODT) dispertab 4 mg  4 mg Feeding Tube Q4HRS PRN Fabi Castano M.D.       • promethazine (PHENERGAN) tablet 12.5-25 mg  12.5-25 mg Feeding Tube Q4HRS PRN Fabi Castano M.D.       • promethazine (PHENERGAN) suppository 12.5-25 mg  12.5-25 mg Rectal Q4HRS PRN Fabi Castano M.D.       • prochlorperazine (COMPAZINE) injection 5-10 mg  5-10 mg Intravenous Q4HRS PRN Fabi Castano M.D.       • methocarbamol (ROBAXIN) tablet 750 mg  750 mg Feeding Tube Q8HRS PRN Fabi Castano M.D.       • ALPRAZolam (XANAX) tablet 0.25 mg  0.25 mg Feeding Tube BID PRN Fabi Castano M.D.       • labetalol (NORMODYNE,TRANDATE) injection 10 mg  10 mg Intravenous Q HOUR PRN Fabi Castano M.D.       • hydrALAZINE (APRESOLINE) injection 10 mg  10 mg Intravenous Q HOUR PRN Fabi Castano M.D.       • ropivacaine (NAROPIN) 2 mg/mL 270 mL in On-Q Pump infusion   Other Continuous Fabi Castano M.D. 4 mL/hr at 09/19/18 2030         Fluids    Intake/Output Summary (Last 24 hours) at 09/21/18 1902  Last data filed at 09/21/18 1800   Gross per 24 hour   Intake             2678 ml   Output             1810 ml   Net              868 ml       Laboratory  Recent Results (from the past 48 hour(s))   Basic Metabolic Panel (BMP)     Collection Time: 09/20/18  3:28 AM   Result Value Ref Range    Sodium 134 (L) 135 - 145 mmol/L    Potassium 5.5 3.6 - 5.5 mmol/L    Chloride 104 96 - 112 mmol/L    Co2 25 20 - 33 mmol/L    Glucose 199 (H) 65 - 99 mg/dL    Bun 20 8 - 22 mg/dL    Creatinine 0.79 0.50 - 1.40 mg/dL    Calcium 8.3 (L) 8.5 - 10.5 mg/dL    Anion Gap 5.0 0.0 - 11.9   CBC without Differential    Collection Time: 09/20/18  3:28 AM   Result Value Ref Range    WBC 15.6 (H) 4.8 - 10.8 K/uL    RBC 4.54 (L) 4.70 - 6.10 M/uL    Hemoglobin 15.0 14.0 - 18.0 g/dL    Hematocrit 45.8 42.0 - 52.0 %    .9 (H) 81.4 - 97.8 fL    MCH 33.0 27.0 - 33.0 pg    MCHC 32.8 (L) 33.7 - 35.3 g/dL    RDW 53.2 (H) 35.9 - 50.0 fL    Platelet Count 204 164 - 446 K/uL    MPV 12.1 9.0 - 12.9 fL   Prothrombin Time (INR)    Collection Time: 09/20/18  3:28 AM   Result Value Ref Range    PT 15.6 (H) 12.0 - 14.6 sec    INR 1.23 (H) 0.87 - 1.13   APTT    Collection Time: 09/20/18  3:28 AM   Result Value Ref Range    APTT 29.0 24.7 - 36.0 sec   ESTIMATED GFR    Collection Time: 09/20/18  3:28 AM   Result Value Ref Range    GFR If African American >60 >60 mL/min/1.73 m 2    GFR If Non African American >60 >60 mL/min/1.73 m 2   Basic Metabolic Panel (BMP)    Collection Time: 09/21/18  4:26 AM   Result Value Ref Range    Sodium 136 135 - 145 mmol/L    Potassium 4.2 3.6 - 5.5 mmol/L    Chloride 100 96 - 112 mmol/L    Co2 29 20 - 33 mmol/L    Glucose 128 (H) 65 - 99 mg/dL    Bun 19 8 - 22 mg/dL    Creatinine 0.81 0.50 - 1.40 mg/dL    Calcium 8.5 8.5 - 10.5 mg/dL    Anion Gap 7.0 0.0 - 11.9   CBC without Differential    Collection Time: 09/21/18  4:26 AM   Result Value Ref Range    WBC 28.3 (H) 4.8 - 10.8 K/uL    RBC 4.45 (L) 4.70 - 6.10 M/uL    Hemoglobin 14.9 14.0 - 18.0 g/dL    Hematocrit 44.9 42.0 - 52.0 %    .9 (H) 81.4 - 97.8 fL    MCH 33.5 (H) 27.0 - 33.0 pg    MCHC 33.2 (L) 33.7 - 35.3 g/dL    RDW 52.4 (H) 35.9 - 50.0 fL    Platelet Count 192 164 - 446 K/uL    MPV  12.3 9.0 - 12.9 fL   Prothrombin Time (INR)    Collection Time: 09/21/18  4:26 AM   Result Value Ref Range    PT 15.2 (H) 12.0 - 14.6 sec    INR 1.19 (H) 0.87 - 1.13   APTT    Collection Time: 09/21/18  4:26 AM   Result Value Ref Range    APTT 26.9 24.7 - 36.0 sec   ESTIMATED GFR    Collection Time: 09/21/18  4:26 AM   Result Value Ref Range    GFR If African American >60 >60 mL/min/1.73 m 2    GFR If Non African American >60 >60 mL/min/1.73 m 2   EC-ECHOCARDIOGRAM COMPLETE W/O CONT    Collection Time: 09/21/18  3:51 PM   Result Value Ref Range    Eject.Frac. MOD BP 40.79     Eject.Frac. MOD 4C 34.28     Eject.Frac. MOD 2C 48.92     Left Ventrical Ejection Fraction 40    DIGOXIN    Collection Time: 09/21/18  4:35 PM   Result Value Ref Range    Digoxin 0.6 (L) 0.8 - 2.0 ng/mL       Imaging    Assessment/Plan  Atrial fibrillation (HCC)- (present on admission)   Assessment & Plan    Currently on amiodarone infusion  However patient has chronic history of atrial fibrillation  Continue digoxin  We will stop amiodarone as per cardiology's note  Neurosurgery recommends no anticoagulate for at least 2 weeks postoperatively  Continue to correct and follow electrolyte  Digoxin level 0.6  Echo shows reduced EF of 40% with dilated left atrium and mildly dilated RV          S/P laminectomy   Assessment & Plan    Status post laminectomy by Dr. Briscoe  Currently with epidural in place  Path sent off to waiting result  Continue pain management  Physical therapy and rehab  No full anticoagulation for 2 weeks postoperatively             VTE:  Contraindicated  Ulcer: Not Indicated  Lines: all indicated       Discussed patient condition and risk of morbidity and/or mortality with RT, Pharmacy, Charge nurse / hot rounds and Patient  The patient remains critically ill.     Will need to discuss timing of epidural removal and vte prophylaxis.    Stable for floor transfer.

## 2018-09-21 NOTE — THERAPY
"Occupational Therapy Evaluation completed.   Functional Status:  Max A LB dressing, CGA standing grooming- pt washed face, brushed/flossed teeth, brushed hair, CGA UB dressing, CGA functional mobility w/ SPC  Plan of Care: Will benefit from Occupational Therapy 3 times per week  Discharge Recommendations:  Equipment: Will Continue to Assess for Equipment Needs. Post-acute therapy Discharge to a transitional care facility for continued skilled therapy services / SNF. Pt would be a very high fall risk if he were DC'd home at this time    See \"Rehab Therapy-Acute\" Patient Summary Report for complete documentation.    Pt is a 63 y/o male who presents to acute s/p lumbar decompression T12-L1 and tumor resection. Pt found to have a-fib during sx. PMH includes G-tube and dysphasia.  No brace per chart. Pt reports he lives alone in a one story home. Pt demo'd decreased balance, functional mobility, and activity tolerance impacting his ability to perform BADLs independently and safely. Will follow for Acute OT services at this time.     "

## 2018-09-21 NOTE — PROGRESS NOTES
Oneil catheter removed per MD order. Pt tolerated well. Tip intact. Pt understands goal is to void by midnight. Urinal at bedside. Will follow up.

## 2018-09-21 NOTE — PROGRESS NOTES
Bladder scan 480. per order rashid placed for retention. Pt stated that urinary tract very tender. Immediate >500 urine drained.

## 2018-09-21 NOTE — PROGRESS NOTES
Monitor summary:     A-fib, occasional couplet/triplet PVCs with activity.  Rate: . Up to 180 with standing and activity.  -- / 0.08 / --

## 2018-09-22 PROCEDURE — 99232 SBSQ HOSP IP/OBS MODERATE 35: CPT | Performed by: INTERNAL MEDICINE

## 2018-09-22 PROCEDURE — 700102 HCHG RX REV CODE 250 W/ 637 OVERRIDE(OP): Performed by: NEUROLOGICAL SURGERY

## 2018-09-22 PROCEDURE — 700102 HCHG RX REV CODE 250 W/ 637 OVERRIDE(OP): Performed by: INTERNAL MEDICINE

## 2018-09-22 PROCEDURE — A9270 NON-COVERED ITEM OR SERVICE: HCPCS | Performed by: NEUROLOGICAL SURGERY

## 2018-09-22 PROCEDURE — 94660 CPAP INITIATION&MGMT: CPT

## 2018-09-22 PROCEDURE — A9270 NON-COVERED ITEM OR SERVICE: HCPCS | Performed by: PHYSICIAN ASSISTANT

## 2018-09-22 PROCEDURE — 302135 SEQUENTIAL COMPRESSION MACHINE: Performed by: INTERNAL MEDICINE

## 2018-09-22 PROCEDURE — 99233 SBSQ HOSP IP/OBS HIGH 50: CPT | Performed by: INTERNAL MEDICINE

## 2018-09-22 PROCEDURE — 770020 HCHG ROOM/CARE - TELE (206)

## 2018-09-22 PROCEDURE — A9270 NON-COVERED ITEM OR SERVICE: HCPCS | Performed by: INTERNAL MEDICINE

## 2018-09-22 PROCEDURE — 700102 HCHG RX REV CODE 250 W/ 637 OVERRIDE(OP): Performed by: PHYSICIAN ASSISTANT

## 2018-09-22 RX ADMIN — OMEPRAZOLE 40 MG: 20 CAPSULE, DELAYED RELEASE ORAL at 05:41

## 2018-09-22 RX ADMIN — METOPROLOL TARTRATE 12.5 MG: 25 TABLET, FILM COATED ORAL at 16:55

## 2018-09-22 RX ADMIN — DIGOXIN 250 MCG: 250 TABLET ORAL at 05:41

## 2018-09-22 RX ADMIN — METOPROLOL TARTRATE 25 MG: 25 TABLET, FILM COATED ORAL at 05:42

## 2018-09-22 RX ADMIN — ACETAMINOPHEN 1000 MG: 500 TABLET, FILM COATED ORAL at 00:18

## 2018-09-22 RX ADMIN — ACETAMINOPHEN 1000 MG: 500 TABLET, FILM COATED ORAL at 12:47

## 2018-09-22 RX ADMIN — OXYCODONE HYDROCHLORIDE 5 MG: 5 SOLUTION ORAL at 04:07

## 2018-09-22 RX ADMIN — ACETAMINOPHEN 1000 MG: 500 TABLET, FILM COATED ORAL at 05:41

## 2018-09-22 RX ADMIN — ACETAMINOPHEN 1000 MG: 500 TABLET, FILM COATED ORAL at 16:55

## 2018-09-22 RX ADMIN — STANDARDIZED SENNA CONCENTRATE AND DOCUSATE SODIUM 1 TABLET: 8.6; 5 TABLET ORAL at 20:18

## 2018-09-22 ASSESSMENT — PATIENT HEALTH QUESTIONNAIRE - PHQ9
1. LITTLE INTEREST OR PLEASURE IN DOING THINGS: NOT AT ALL
SUM OF ALL RESPONSES TO PHQ9 QUESTIONS 1 AND 2: 0
2. FEELING DOWN, DEPRESSED, IRRITABLE, OR HOPELESS: NOT AT ALL

## 2018-09-22 ASSESSMENT — PAIN SCALES - GENERAL
PAINLEVEL_OUTOF10: 3
PAINLEVEL_OUTOF10: 5
PAINLEVEL_OUTOF10: 3
PAINLEVEL_OUTOF10: 0
PAINLEVEL_OUTOF10: 3
PAINLEVEL_OUTOF10: 3

## 2018-09-22 ASSESSMENT — ENCOUNTER SYMPTOMS
ABDOMINAL DISTENTION: 0
DIZZINESS: 0
SHORTNESS OF BREATH: 0
ABDOMINAL PAIN: 0
COUGH: 0
WHEEZING: 0
FATIGUE: 0
BACK PAIN: 1
HEADACHES: 0
CHILLS: 0
FEVER: 0

## 2018-09-22 NOTE — PROGRESS NOTES
Neurosurgery Progress Note    Subjective:  POD #3  Pain controlled  Rashid removed , had retention, rashid replaced  Heart Rate controlled-on amiodarone drip      Exam:  LE motor 5/5 throughout bilaterally  CDI with onQ  VSS  WBC 28 : On dex taper  afebrile    BP  Min: 99/63  Max: 107/65  Pulse  Av.9  Min: 74  Max: 99  Resp  Av.2  Min: 16  Max: 23  Temp  Av.4 °C (97.6 °F)  Min: 36.3 °C (97.3 °F)  Max: 36.6 °C (97.9 °F)  SpO2  Av.6 %  Min: 89 %  Max: 97 %    No Data Recorded    Recent Labs      18   16318   042   WBC  10.5  15.6*  28.3*   RBC  4.79  4.54*  4.45*   HEMOGLOBIN  16.0  15.0  14.9   HEMATOCRIT  48.5  45.8  44.9   MCV  101.3*  100.9*  100.9*   MCH  33.4*  33.0  33.5*   MCHC  33.0*  32.8*  33.2*   RDW  52.8*  53.2*  52.4*   PLATELETCT  181  204  192   MPV  11.6  12.1  12.3     Recent Labs      18   1632  188  18   0426   SODIUM  138  134*  136   POTASSIUM  4.6  5.5  4.2   CHLORIDE  106  104  100   CO2  24  25  29   GLUCOSE  121*  199*  128*   BUN  20  20  19   CREATININE  0.81  0.79  0.81   CALCIUM  8.7  8.3*  8.5     Recent Labs      188  18   0426   APTT  29.0  26.9   INR  1.23*  1.19*           Intake/Output       18 - 18 - 1859       Total  Total       Intake    P.O.  --  0 0  --  -- --    P.O. -- 0 0 -- -- --    I.V.  94  -- 94  --  -- --    Amiodarone Volume 34 -- 34 -- -- --    Volume (mL) (D5W infusion) 60 -- 60 -- -- --    Other  240  -- 240  --  -- --    Medications (PO/Enteral Liquids) 240 -- 240 -- -- --    NG/GT  240  1800 2040  240  -- 240    Intake (mL) (Enteral Tube PEG: Percutaneous endoscopic gastrostomy Abdomen) 240 1800  240 -- 240    Total Intake 574 1800 2374 240 -- 240       Output    Urine  1010  1050 2060  200  -- 200    Number of Times Voided -- -- -- 1 x -- 1 x    Output (mL) (Urethral Catheter)  1010 1050 2060 200 -- 200    Drains  0  -- 0  --  -- --    Residual Amount (mL) (Discarded) (Enteral Tube PEG: Percutaneous endoscopic gastrostomy Abdomen) 0 -- 0 -- -- --    Stool  --  -- --  --  -- --    Number of Times Stooled -- 0 x 0 x -- -- --    Total Output 1010 1050 2060 200 -- 200       Net I/O     -436 750 314 40 -- 40            Intake/Output Summary (Last 24 hours) at 09/22/18 1234  Last data filed at 09/22/18 0800   Gross per 24 hour   Intake             2160 ml   Output             1570 ml   Net              590 ml            • metoprolol  12.5 mg TWICE DAILY   • oxyCODONE  5 mg Q4HRS PRN   • digoxin  250 mcg DAILY   • omeprazole 2 mg/mL in sodium bicarbonate  40 mg DAILY   • Pharmacy Consult Request  1 Each PRN   • MD ALERT...DO NOT ADMINISTER NSAIDS or ASPIRIN unless ORDERED By Neurosurgery  1 Each PRN   • senna-docusate  1 Tab Nightly   • senna-docusate  1 Tab Q24HRS PRN   • polyethylene glycol/lytes  1 Packet BID PRN   • magnesium hydroxide  30 mL QDAY PRN   • bisacodyl  10 mg Q24HRS PRN   • fleet  1 Each Once PRN   • Respiratory Care per Protocol   Continuous RT   • acetaminophen  1,000 mg Q6HRS   • morphine injection  2 mg Once PRN   • diphenhydrAMINE  25 mg Q6HRS PRN    Or   • diphenhydrAMINE  25 mg Q6HRS PRN   • ondansetron  4 mg Q4HRS PRN   • ondansetron  4 mg Q4HRS PRN   • promethazine  12.5-25 mg Q4HRS PRN   • promethazine  12.5-25 mg Q4HRS PRN   • prochlorperazine  5-10 mg Q4HRS PRN   • methocarbamol  750 mg Q8HRS PRN   • ALPRAZolam  0.25 mg BID PRN   • labetalol  10 mg Q HOUR PRN   • hydrALAZINE  10 mg Q HOUR PRN   • On-Q Pump - Ropivacaine 270 mL (fixed-flow rate, dual-lumen)   Continuous       Assessment and Plan:  Hospital day #4  POD #3  Prophylactic anticoagulation: no    Plan:     DC OnQ pumps  Ok to start lovenox  Ok to floor  SX planning rehab consult

## 2018-09-22 NOTE — PROGRESS NOTES
Cardiology Follow Up Progress Note    Date of Service  9/22/2018    Attending Physician  Fabi Castano M.D.    Chief Complaint   Atrial fibrillation    HPI  Chevy Angeles is a 62 y.o. male admitted for back surgery and found to be in atrial fibrillation.    Interim Events  Overnight patient was taken off of telemetry as he was ordered to be transferred to a medical/surgical floor.  He reports walking this morning and feeling dizzy.  Overnight had rapid ventricular response.  Feels okay this morning.  Patient placed back on telemetry this morning per my order and in rate controlled atrial fibrillation.    Review of Systems  Review of Systems   Constitutional: Negative for fatigue.   Respiratory: Negative for shortness of breath.    Cardiovascular: Negative for chest pain.   Gastrointestinal: Negative for abdominal pain.   Neurological: Negative for dizziness.   All other systems reviewed and are negative.      Vital signs in last 24 hours  Temp:  [36.3 °C (97.3 °F)-36.6 °C (97.9 °F)] 36.6 °C (97.9 °F)  Pulse:  [71-99] 77  Resp:  [16-18] 16  BP: ()/(63-65) 107/65    Physical Exam  Physical Exam   Constitutional: He is oriented to person, place, and time. No distress.   Cardiovascular: Normal rate.  An irregularly irregular rhythm present. Exam reveals no gallop and no friction rub.    No murmur heard.  Pulmonary/Chest: Effort normal and breath sounds normal. He has no wheezes. He has no rales.   Abdominal: Soft. There is no tenderness.   Musculoskeletal: He exhibits no edema.   Neurological: He is alert and oriented to person, place, and time.   Skin: He is not diaphoretic.   Nursing note and vitals reviewed.  No change in exam from 9/21/2018.    Lab Review  Lab Results   Component Value Date/Time    WBC 28.3 (H) 09/21/2018 04:26 AM    RBC 4.45 (L) 09/21/2018 04:26 AM    HEMOGLOBIN 14.9 09/21/2018 04:26 AM    HEMATOCRIT 44.9 09/21/2018 04:26 AM    .9 (H) 09/21/2018 04:26 AM    MCH 33.5 (H)  09/21/2018 04:26 AM    MCHC 33.2 (L) 09/21/2018 04:26 AM    MPV 12.3 09/21/2018 04:26 AM      Lab Results   Component Value Date/Time    SODIUM 136 09/21/2018 04:26 AM    POTASSIUM 4.2 09/21/2018 04:26 AM    CHLORIDE 100 09/21/2018 04:26 AM    CO2 29 09/21/2018 04:26 AM    GLUCOSE 128 (H) 09/21/2018 04:26 AM    BUN 19 09/21/2018 04:26 AM    CREATININE 0.81 09/21/2018 04:26 AM      Lab Results   Component Value Date/Time    ASTSGOT 12 11/16/2013 05:20 AM    ALTSGPT 13 11/16/2013 05:20 AM     Labs reviewed.  Digoxin 0.6.    Cardiac Imaging and Procedures Review  Telemetry this morning was reviewed and shows rate controlled atrial fibrillation  Echocardiogram yesterday was personally reviewed and shows a reduced LV systolic function with ejection fraction of about 40%.      Assessment/Plan    Atrial fibrillation: Rate controlled this morning.  Will request ongoing telemetry monitoring.  Continue digoxin at current dose.  Continue metoprolol at current dose.  Anticoagulant has been held for 2 weeks postop per neurosurgery recommendations.    Cardiomyopathy: New diagnosis for the patient.  Likely tachycardia mediated.  Continue metoprolol at current dose.  Will consider transitioning to succinate closer to discharge.  Patient does not have blood pressure room for other cardioprotective medications like ACE inhibitors and spironolactone.  He is not fluid overloaded.  No indication for Lasix.    Will follow    Thank you for allowing me to participate in the care of this patient.  I will continue to follow this patient    Please contact me with any questions.    Celia Rocha M.D.   Cardiologist, [facility]

## 2018-09-22 NOTE — PROGRESS NOTES
Pt transferred to S141 via wheelchair with transport tech and ACLS RN. All belongings, medications, and chart with pt.

## 2018-09-22 NOTE — CARE PLAN
Problem: Urinary Elimination:  Goal: Ability to reestablish a normal urinary elimination pattern will improve    Intervention: Evaluate need to continue indwelling urinary catheter  Oneil in place for urinary retention post surgery. Educated on plan for removal.       Problem: Bowel/Gastric:  Goal: Normal bowel function is maintained or improved  Outcome: PROGRESSING SLOWER THAN EXPECTED  Patients last bowel movement was 9/18. Bowel protocol medications used to aid in bowel evacuation.

## 2018-09-22 NOTE — CARE PLAN
Problem: Urinary Elimination:  Goal: Ability to reestablish a normal urinary elimination pattern will improve  Rashid in place due to urinary rentention while epidural remains in place. Per neurosurgery, rashid is to be left until orders to d/c, likely in 1-2 days.     Problem: Bowel/Gastric:  Goal: Normal bowel function is maintained or improved  Last BM 9/18- POC includes walking throughout shift and additional miralax this afternoon if no BM achieved several hours after epidural is removed. BS active x4 quadrants. Abdomen soft and non-distended.

## 2018-09-22 NOTE — ASSESSMENT & PLAN NOTE
Currently on amiodarone infusion  However patient has chronic history of atrial fibrillation  Continue digoxin  We will stop amiodarone as per cardiology's note  Neurosurgery recommends no anticoagulate for at least 2 weeks postoperatively  Continue to correct and follow electrolyte  Digoxin level 0.6  Echo shows reduced EF of 40% with dilated left atrium and mildly dilated RV  Patient started on metoprolol 25 twice daily today however had significant bradycardia with dual court action agents with digoxin will reduce this to 12-1/2 mg twice daily

## 2018-09-22 NOTE — PROGRESS NOTES
Critical Care Progress Note    Date of admission  9/19/2018    Hospital Course  62 y.o. 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  He has a history of chronic Carlos of atrial fibrillation which is usually controlled with digoxin.  On the day of surgery he vomited up his dose however had A. fib with RVR requiring amiodarone infusion and stabilization so was admitted to the ICU.  Patient usually takes a NOAC which was held secondary to his planned surgery.  Currently patient feels fine without complaints of dizziness lightheadedness chest pain or shortness of breath.  Chronically patient has vocal cord dysfunction and difficulty swallowing secondary to prior schwannoma resection.     Interval Problem Update  Reviewed last 24 hour events:  - off gtt  - medical orders but cards want telemetry  - chantale 70-80's  - ambulated  - may need to reduce metoprolol  - BM 9/18  - boost and dura fiber  - urine 600ml retention rashid  - mobilized with cane  - nocturanal cpap          PRIOR:  -aflutter  -amio  -epidural  -rashid for retention  -respiratory  -ppi   - vte  -dc amio  -tylenol around the clock    Review of Systems  Review of Systems   Constitutional: Negative for chills, fatigue and fever.   Respiratory: Negative for cough, shortness of breath and wheezing.    Cardiovascular: Negative for leg swelling.   Gastrointestinal: Negative for abdominal distention and abdominal pain.   Musculoskeletal: Positive for back pain.   Neurological: Negative for headaches.        Vital Signs for last 24 hours   Temp:  [36.3 °C (97.3 °F)-36.6 °C (97.9 °F)] 36.6 °C (97.9 °F)  Pulse:  [74-99] 77  Resp:  [16-23] 23  BP: ()/(63-65) 107/65    Hemodynamic parameters for last 24 hours       Vent Settings for last 24 hours       Physical Exam   Physical Exam   Constitutional: He is oriented to person, place, and time. He appears well-developed.   HENT:   Head:  Normocephalic and atraumatic.   Eyes: Pupils are equal, round, and reactive to light. Conjunctivae are normal.   Neck: Normal range of motion. Neck supple. No JVD present. No thyromegaly present.   Cardiovascular: Exam reveals no friction rub.    No murmur heard.  Irregular irregular   Pulmonary/Chest: No respiratory distress. He has no wheezes. He has no rales.   Abdominal: He exhibits no distension. There is no tenderness. There is no rebound.   Musculoskeletal: He exhibits no edema.   Lymphadenopathy:     He has no cervical adenopathy.   Neurological: He is alert and oriented to person, place, and time. No cranial nerve deficit.   Skin: Skin is warm and dry.   Psychiatric: He has a normal mood and affect.       Medications  Current Facility-Administered Medications   Medication Dose Route Frequency Provider Last Rate Last Dose   • metoprolol (LOPRESSOR) tablet 12.5 mg  12.5 mg Oral TWICE DAILY Catarino Ferguson M.D.       • oxyCODONE (ROXICODONE) oral solution 5 mg  5 mg Feeding Tube Q4HRS PRN Pranay Pelletier P.A.-C.   5 mg at 09/22/18 0407   • digoxin (LANOXIN) tablet 250 mcg  250 mcg Feeding Tube DAILY Fabi Castano M.D.   250 mcg at 09/22/18 0541   • omeprazole 2 mg/mL in sodium bicarbonate (PRILOSEC) oral susp 40 mg  40 mg Per G Tube DAILY Fabi Castano M.D.   40 mg at 09/22/18 0541   • Pharmacy Consult Request ...Pain Management Review 1 Each  1 Each Other PRN Fabi Castano M.D.       • MD ALERT...DO NOT ADMINISTER NSAIDS or ASPIRIN unless ORDERED By Neurosurgery 1 Each  1 Each Other PRN Fabi Castano M.D.       • senna-docusate (PERICOLACE or SENOKOT S) 8.6-50 MG per tablet 1 Tab  1 Tab Feeding Tube Nightly Fabi Castano M.D.   1 Tab at 09/21/18 1952   • senna-docusate (PERICOLACE or SENOKOT S) 8.6-50 MG per tablet 1 Tab  1 Tab Feeding Tube Q24HRS PRN Fabi Castano M.D.   1 Tab at 09/21/18 2154   • polyethylene glycol/lytes (MIRALAX) PACKET 1 Packet  1 Packet Oral BID PRN Fabi Castano,  M.D.   1 Packet at 09/21/18 1652   • magnesium hydroxide (MILK OF MAGNESIA) suspension 30 mL  30 mL Feeding Tube QDAY PRN Fabi Castano M.D.       • bisacodyl (DULCOLAX) suppository 10 mg  10 mg Rectal Q24HRS PRN Fabi Castano M.D.       • fleet enema 133 mL  1 Each Rectal Once PRN Fabi Castano M.D.       • Respiratory Care per Protocol   Nebulization Continuous RT Fabi Castano M.D.       • acetaminophen (TYLENOL) tablet 1,000 mg  1,000 mg Feeding Tube Q6HRS Fabi Castano M.D.   1,000 mg at 09/22/18 0541   • morphine (pf) 4 mg/ml injection 2 mg  2 mg Intravenous Once PRN Fabi Castano M.D.       • diphenhydrAMINE (BENADRYL) tablet/capsule 25 mg  25 mg Oral Q6HRS PRN Fabi Castano M.D.        Or   • diphenhydrAMINE (BENADRYL) injection 25 mg  25 mg Intravenous Q6HRS PRN Fabi Castano M.D.       • ondansetron (ZOFRAN) syringe/vial injection 4 mg  4 mg Intravenous Q4HRS PRN Fabi Castano M.D.   4 mg at 09/20/18 0049   • ondansetron (ZOFRAN ODT) dispertab 4 mg  4 mg Feeding Tube Q4HRS PRN Fabi Castano M.D.       • promethazine (PHENERGAN) tablet 12.5-25 mg  12.5-25 mg Feeding Tube Q4HRS PRN Fabi Castano M.D.       • promethazine (PHENERGAN) suppository 12.5-25 mg  12.5-25 mg Rectal Q4HRS PRN Fabi Castano M.D.       • prochlorperazine (COMPAZINE) injection 5-10 mg  5-10 mg Intravenous Q4HRS PRN Fabi Castano M.D.       • methocarbamol (ROBAXIN) tablet 750 mg  750 mg Feeding Tube Q8HRS PRN Fabi Castano M.D.       • ALPRAZolam (XANAX) tablet 0.25 mg  0.25 mg Feeding Tube BID PRN Fabi Castano M.D.       • labetalol (NORMODYNE,TRANDATE) injection 10 mg  10 mg Intravenous Q HOUR PRN Fabi Castano M.D.       • hydrALAZINE (APRESOLINE) injection 10 mg  10 mg Intravenous Q HOUR PRN Fabi Castano M.D.       • ropivacaine (NAROPIN) 2 mg/mL 270 mL in On-Q Pump infusion   Other Continuous Fabi Castano M.D. 4 mL/hr at 09/19/18 2030         Fluids    Intake/Output Summary  (Last 24 hours) at 09/22/18 1112  Last data filed at 09/22/18 0800   Gross per 24 hour   Intake             2160 ml   Output             1910 ml   Net              250 ml       Laboratory  Recent Results (from the past 48 hour(s))   Basic Metabolic Panel (BMP)    Collection Time: 09/21/18  4:26 AM   Result Value Ref Range    Sodium 136 135 - 145 mmol/L    Potassium 4.2 3.6 - 5.5 mmol/L    Chloride 100 96 - 112 mmol/L    Co2 29 20 - 33 mmol/L    Glucose 128 (H) 65 - 99 mg/dL    Bun 19 8 - 22 mg/dL    Creatinine 0.81 0.50 - 1.40 mg/dL    Calcium 8.5 8.5 - 10.5 mg/dL    Anion Gap 7.0 0.0 - 11.9   CBC without Differential    Collection Time: 09/21/18  4:26 AM   Result Value Ref Range    WBC 28.3 (H) 4.8 - 10.8 K/uL    RBC 4.45 (L) 4.70 - 6.10 M/uL    Hemoglobin 14.9 14.0 - 18.0 g/dL    Hematocrit 44.9 42.0 - 52.0 %    .9 (H) 81.4 - 97.8 fL    MCH 33.5 (H) 27.0 - 33.0 pg    MCHC 33.2 (L) 33.7 - 35.3 g/dL    RDW 52.4 (H) 35.9 - 50.0 fL    Platelet Count 192 164 - 446 K/uL    MPV 12.3 9.0 - 12.9 fL   Prothrombin Time (INR)    Collection Time: 09/21/18  4:26 AM   Result Value Ref Range    PT 15.2 (H) 12.0 - 14.6 sec    INR 1.19 (H) 0.87 - 1.13   APTT    Collection Time: 09/21/18  4:26 AM   Result Value Ref Range    APTT 26.9 24.7 - 36.0 sec   ESTIMATED GFR    Collection Time: 09/21/18  4:26 AM   Result Value Ref Range    GFR If African American >60 >60 mL/min/1.73 m 2    GFR If Non African American >60 >60 mL/min/1.73 m 2   EC-ECHOCARDIOGRAM COMPLETE W/O CONT    Collection Time: 09/21/18  3:51 PM   Result Value Ref Range    Eject.Frac. MOD BP 40.79     Eject.Frac. MOD 4C 34.28     Eject.Frac. MOD 2C 48.92     Left Ventrical Ejection Fraction 40    DIGOXIN    Collection Time: 09/21/18  4:35 PM   Result Value Ref Range    Digoxin 0.6 (L) 0.8 - 2.0 ng/mL       Imaging    Assessment/Plan  Atrial fibrillation (HCC)- (present on admission)   Assessment & Plan    Currently on amiodarone infusion  However patient has chronic  history of atrial fibrillation  Continue digoxin  We will stop amiodarone as per cardiology's note  Neurosurgery recommends no anticoagulate for at least 2 weeks postoperatively  Continue to correct and follow electrolyte  Digoxin level 0.6  Echo shows reduced EF of 40% with dilated left atrium and mildly dilated RV  Patient started on metoprolol 25 twice daily today however had significant bradycardia with dual court action agents with digoxin will reduce this to 12-1/2 mg twice daily          S/P laminectomy   Assessment & Plan    Status post laminectomy by Dr. Briscoe  Currently with epidural in place  Path sent off to waiting result  Continue pain management  Physical therapy and rehab  No full anticoagulation for 2 weeks postoperatively        Gastrojejunostomy tube dislodgement (HCC)- (present on admission)   Assessment & Plan    Secondary to vocal cord dysfunction status post schwannoma resection  Continue gastric feeds             VTE:  Will need to discuss timing with epidural removal.   Ulcer: Not Indicated  Lines: all indicated       Discussed patient condition and risk of morbidity and/or mortality with RT, Pharmacy, Charge nurse / hot rounds and Patient  The patient remains critically ill.     Will need to discuss timing of epidural removal and vte prophylaxis.    Stable for floor transfer.

## 2018-09-22 NOTE — PROGRESS NOTES
Request from Cardiology to call Neurosurgery to change transfer orders to telemetry for cardiac monitoring. NIGEL Pelletier paged at this time to adjust transfer order. Pt currently Aflutter 70's.

## 2018-09-22 NOTE — PROGRESS NOTES
On-Q pump pulled with no resistance. Gauze and tegaderm dressing placed over puncture sites. No complications noted.

## 2018-09-22 NOTE — ASSESSMENT & PLAN NOTE
Status post laminectomy by Dr. Briscoe  Currently with epidural in place  Path sent off to waiting result  Continue pain management  Physical therapy and rehab  No full anticoagulation for 2 weeks postoperatively

## 2018-09-22 NOTE — PROGRESS NOTES
Pt oriented x 4.  Yaunker and suction at bedside.  Bed extension ordered.  Will continue to monitor.

## 2018-09-23 ENCOUNTER — HOSPITAL ENCOUNTER (INPATIENT)
Facility: REHABILITATION | Age: 63
End: 2018-09-23
Attending: PHYSICAL MEDICINE & REHABILITATION | Admitting: PHYSICAL MEDICINE & REHABILITATION
Payer: COMMERCIAL

## 2018-09-23 LAB
ANION GAP SERPL CALC-SCNC: 5 MMOL/L (ref 0–11.9)
ANISOCYTOSIS BLD QL SMEAR: ABNORMAL
BASOPHILS # BLD AUTO: 6.2 % (ref 0–1.8)
BASOPHILS # BLD: 0.99 K/UL (ref 0–0.12)
BUN SERPL-MCNC: 16 MG/DL (ref 8–22)
CALCIUM SERPL-MCNC: 8.2 MG/DL (ref 8.5–10.5)
CHLORIDE SERPL-SCNC: 100 MMOL/L (ref 96–112)
CO2 SERPL-SCNC: 31 MMOL/L (ref 20–33)
CREAT SERPL-MCNC: 0.65 MG/DL (ref 0.5–1.4)
EOSINOPHIL # BLD AUTO: 0.14 K/UL (ref 0–0.51)
EOSINOPHIL NFR BLD: 0.9 % (ref 0–6.9)
ERYTHROCYTE [DISTWIDTH] IN BLOOD BY AUTOMATED COUNT: 53 FL (ref 35.9–50)
GLUCOSE SERPL-MCNC: 86 MG/DL (ref 65–99)
HCT VFR BLD AUTO: 44.2 % (ref 42–52)
HGB BLD-MCNC: 15.2 G/DL (ref 14–18)
LYMPHOCYTES # BLD AUTO: 0.72 K/UL (ref 1–4.8)
LYMPHOCYTES NFR BLD: 4.5 % (ref 22–41)
MACROCYTES BLD QL SMEAR: ABNORMAL
MANUAL DIFF BLD: NORMAL
MCH RBC QN AUTO: 33.9 PG (ref 27–33)
MCHC RBC AUTO-ENTMCNC: 34.4 G/DL (ref 33.7–35.3)
MCV RBC AUTO: 98.4 FL (ref 81.4–97.8)
METAMYELOCYTES NFR BLD MANUAL: 7.1 %
MONOCYTES # BLD AUTO: 0.58 K/UL (ref 0–0.85)
MONOCYTES NFR BLD AUTO: 3.6 % (ref 0–13.4)
MORPHOLOGY BLD-IMP: NORMAL
MYELOCYTES NFR BLD MANUAL: 4.5 %
NEUTROPHILS # BLD AUTO: 11.71 K/UL (ref 1.82–7.42)
NEUTROPHILS NFR BLD: 73.2 % (ref 44–72)
NRBC # BLD AUTO: 0 K/UL
NRBC BLD-RTO: 0 /100 WBC
PLATELET # BLD AUTO: 169 K/UL (ref 164–446)
PLATELET BLD QL SMEAR: NORMAL
PMV BLD AUTO: 11.8 FL (ref 9–12.9)
POTASSIUM SERPL-SCNC: 3.8 MMOL/L (ref 3.6–5.5)
RBC # BLD AUTO: 4.49 M/UL (ref 4.7–6.1)
RBC BLD AUTO: PRESENT
SODIUM SERPL-SCNC: 136 MMOL/L (ref 135–145)
WBC # BLD AUTO: 16 K/UL (ref 4.8–10.8)

## 2018-09-23 PROCEDURE — A9270 NON-COVERED ITEM OR SERVICE: HCPCS | Performed by: INTERNAL MEDICINE

## 2018-09-23 PROCEDURE — 700102 HCHG RX REV CODE 250 W/ 637 OVERRIDE(OP): Performed by: NEUROLOGICAL SURGERY

## 2018-09-23 PROCEDURE — 99358 PROLONG SERVICE W/O CONTACT: CPT | Performed by: PHYSICAL MEDICINE & REHABILITATION

## 2018-09-23 PROCEDURE — 36415 COLL VENOUS BLD VENIPUNCTURE: CPT

## 2018-09-23 PROCEDURE — 85027 COMPLETE CBC AUTOMATED: CPT

## 2018-09-23 PROCEDURE — A9270 NON-COVERED ITEM OR SERVICE: HCPCS | Performed by: NEUROLOGICAL SURGERY

## 2018-09-23 PROCEDURE — 700102 HCHG RX REV CODE 250 W/ 637 OVERRIDE(OP): Performed by: INTERNAL MEDICINE

## 2018-09-23 PROCEDURE — 85007 BL SMEAR W/DIFF WBC COUNT: CPT

## 2018-09-23 PROCEDURE — 90471 IMMUNIZATION ADMIN: CPT

## 2018-09-23 PROCEDURE — 700111 HCHG RX REV CODE 636 W/ 250 OVERRIDE (IP): Performed by: NEUROLOGICAL SURGERY

## 2018-09-23 PROCEDURE — 99233 SBSQ HOSP IP/OBS HIGH 50: CPT | Performed by: INTERNAL MEDICINE

## 2018-09-23 PROCEDURE — 80048 BASIC METABOLIC PNL TOTAL CA: CPT

## 2018-09-23 PROCEDURE — 770020 HCHG ROOM/CARE - TELE (206)

## 2018-09-23 PROCEDURE — 90686 IIV4 VACC NO PRSV 0.5 ML IM: CPT | Performed by: NEUROLOGICAL SURGERY

## 2018-09-23 RX ADMIN — OMEPRAZOLE 40 MG: 20 CAPSULE, DELAYED RELEASE ORAL at 06:37

## 2018-09-23 RX ADMIN — BISACODYL 10 MG: 10 SUPPOSITORY RECTAL at 14:45

## 2018-09-23 RX ADMIN — ACETAMINOPHEN 1000 MG: 500 TABLET, FILM COATED ORAL at 23:49

## 2018-09-23 RX ADMIN — ACETAMINOPHEN 1000 MG: 500 TABLET, FILM COATED ORAL at 06:37

## 2018-09-23 RX ADMIN — POLYETHYLENE GLYCOL 3350 1 PACKET: 17 POWDER, FOR SOLUTION ORAL at 10:25

## 2018-09-23 RX ADMIN — METOPROLOL TARTRATE 12.5 MG: 25 TABLET, FILM COATED ORAL at 06:00

## 2018-09-23 RX ADMIN — ACETAMINOPHEN 1000 MG: 500 TABLET, FILM COATED ORAL at 00:02

## 2018-09-23 RX ADMIN — STANDARDIZED SENNA CONCENTRATE AND DOCUSATE SODIUM 1 TABLET: 8.6; 5 TABLET ORAL at 19:55

## 2018-09-23 RX ADMIN — ACETAMINOPHEN 1000 MG: 500 TABLET, FILM COATED ORAL at 16:38

## 2018-09-23 RX ADMIN — DIGOXIN 250 MCG: 250 TABLET ORAL at 06:37

## 2018-09-23 RX ADMIN — INFLUENZA A VIRUS A/MICHIGAN/45/2015 X-275 (H1N1) ANTIGEN (FORMALDEHYDE INACTIVATED), INFLUENZA A VIRUS A/SINGAPORE/INFIMH-16-0019/2016 IVR-186 (H3N2) ANTIGEN (FORMALDEHYDE INACTIVATED), INFLUENZA B VIRUS B/PHUKET/3073/2013 ANTIGEN (FORMALDEHYDE INACTIVATED), AND INFLUENZA B VIRUS B/MARYLAND/15/2016 BX-69A ANTIGEN (FORMALDEHYDE INACTIVATED) 0.5 ML: 15; 15; 15; 15 INJECTION, SUSPENSION INTRAMUSCULAR at 10:13

## 2018-09-23 RX ADMIN — MAGNESIUM HYDROXIDE 30 ML: 400 SUSPENSION ORAL at 10:25

## 2018-09-23 RX ADMIN — ENOXAPARIN SODIUM 40 MG: 100 INJECTION SUBCUTANEOUS at 10:14

## 2018-09-23 RX ADMIN — METOPROLOL TARTRATE 25 MG: 25 TABLET, FILM COATED ORAL at 16:38

## 2018-09-23 ASSESSMENT — ENCOUNTER SYMPTOMS
FATIGUE: 0
SHORTNESS OF BREATH: 0
ABDOMINAL PAIN: 0
DIZZINESS: 0

## 2018-09-23 ASSESSMENT — PATIENT HEALTH QUESTIONNAIRE - PHQ9
1. LITTLE INTEREST OR PLEASURE IN DOING THINGS: NOT AT ALL
2. FEELING DOWN, DEPRESSED, IRRITABLE, OR HOPELESS: NOT AT ALL
SUM OF ALL RESPONSES TO PHQ9 QUESTIONS 1 AND 2: 0

## 2018-09-23 ASSESSMENT — PAIN SCALES - GENERAL
PAINLEVEL_OUTOF10: 0
PAINLEVEL_OUTOF10: 0

## 2018-09-23 NOTE — PROGRESS NOTES
Neurosurgery Progress Note    Subjective:  POD #4  Pain controlled  Rashid removed 9, had retention, rashid replaced  Heart Rate controlled- per pt cards signed off  Denies HA  Working with PTOT    Exam:  LE motor 5/5 throughout bilaterally  Incision not visualized as patient giving self meds via PEG    BP  Min: 94/60  Max: 115/58  Pulse  Av.4  Min: 66  Max: 83  Resp  Av.4  Min: 17  Max: 24  Temp  Av.8 °C (98.3 °F)  Min: 36.7 °C (98.1 °F)  Max: 37 °C (98.6 °F)  SpO2  Av.8 %  Min: 91 %  Max: 100 %    No Data Recorded    Recent Labs      18   0426  18   0156   WBC  28.3*  16.0*   RBC  4.45*  4.49*   HEMOGLOBIN  14.9  15.2   HEMATOCRIT  44.9  44.2   MCV  100.9*  98.4*   MCH  33.5*  33.9*   MCHC  33.2*  34.4   RDW  52.4*  53.0*   PLATELETCT  192  169   MPV  12.3  11.8     Recent Labs      18   0426  18   0156   SODIUM  136  136   POTASSIUM  4.2  3.8   CHLORIDE  100  100   CO2  29  31   GLUCOSE  128*  86   BUN  19  16   CREATININE  0.81  0.65   CALCIUM  8.5  8.2*     Recent Labs      18   0426   APTT  26.9   INR  1.19*           Intake/Output       18 0700 - 18 0659 18 07 - 18 0659      [id number] 6223-8703 Total 9276-9227 6242-5259 Total       Intake    NG/GT  590  -- 590  --  -- --    Intake (mL) (Enteral Tube PEG: Percutaneous endoscopic gastrostomy Abdomen) 590 -- 590 -- -- --    Enteral  --  320 320  30  -- 30    Free Water / Tube Flush -- 320 320 30 -- 30    Total Intake 590 320 910 30 -- 30       Output    Urine  1750  3650 5400  --  -- --    Number of Times Voided 1 x -- 1 x -- -- --    Output (mL) (Urethral Catheter) 1750 3650 5400 -- -- --    Total Output 1750 3650 5400 -- -- --       Net I/O     -1160 -3330 -4490 30 -- 30            Intake/Output Summary (Last 24 hours) at 18 1045  Last data filed at 18 0900   Gross per 24 hour   Intake              700 ml   Output             5400 ml   Net            -4700 ml            •  enoxaparin (LOVENOX) injection  40 mg DAILY   • metoprolol  12.5 mg TWICE DAILY   • oxyCODONE  5 mg Q4HRS PRN   • digoxin  250 mcg DAILY   • omeprazole 2 mg/mL in sodium bicarbonate  40 mg DAILY   • Pharmacy Consult Request  1 Each PRN   • MD ALERT...DO NOT ADMINISTER NSAIDS or ASPIRIN unless ORDERED By Neurosurgery  1 Each PRN   • senna-docusate  1 Tab Nightly   • senna-docusate  1 Tab Q24HRS PRN   • polyethylene glycol/lytes  1 Packet BID PRN   • magnesium hydroxide  30 mL QDAY PRN   • bisacodyl  10 mg Q24HRS PRN   • fleet  1 Each Once PRN   • Respiratory Care per Protocol   Continuous RT   • acetaminophen  1,000 mg Q6HRS   • morphine injection  2 mg Once PRN   • diphenhydrAMINE  25 mg Q6HRS PRN    Or   • diphenhydrAMINE  25 mg Q6HRS PRN   • ondansetron  4 mg Q4HRS PRN   • ondansetron  4 mg Q4HRS PRN   • promethazine  12.5-25 mg Q4HRS PRN   • promethazine  12.5-25 mg Q4HRS PRN   • prochlorperazine  5-10 mg Q4HRS PRN   • methocarbamol  750 mg Q8HRS PRN   • ALPRAZolam  0.25 mg BID PRN   • labetalol  10 mg Q HOUR PRN   • hydrALAZINE  10 mg Q HOUR PRN       Assessment and Plan:  Hospital day #5  POD #4  Prophylactic anticoagulation: no    Plan:  Ok to start lovenox- dw Pranay  Continue pain control  PTOT ambulate  Rehab consult in place

## 2018-09-23 NOTE — PROGRESS NOTES
Pt has c/o pain in his coccyx.  Offered pt more frequent turns which has alleviated the pain.  Pt is A/O x4.  No BM yet, but is passing flatus.

## 2018-09-23 NOTE — PROGRESS NOTES
Pt is A & O x 4. Denies any pain. PEG tube in place. Pt does his own boost and fiber feedings and oral suction. Pt offered flu shot. Pt verbalizes understanding of POC.

## 2018-09-23 NOTE — PROGRESS NOTES
Cardiology Follow Up Progress Note    Date of Service  9/23/2018    Attending Physician  Fabi Castano M.D.    Chief Complaint   Atrial fibrillation    HPI  Chevy Angeles is a 62 y.o. male admitted for back surgery and found to be in atrial fibrillation.    Interim Events  Transfer to telemetry.  Feels well this morning.      Review of Systems  Review of Systems   Constitutional: Negative for fatigue.   Respiratory: Negative for shortness of breath.    Cardiovascular: Negative for chest pain.   Gastrointestinal: Negative for abdominal pain.   Neurological: Negative for dizziness.   All other systems reviewed and are negative.      Vital signs in last 24 hours  Temp:  [36.7 °C (98.1 °F)-37 °C (98.6 °F)] 36.7 °C (98.1 °F)  Pulse:  [66-84] 84  Resp:  [17-24] 18  BP: ()/(56-71) 96/64    Physical Exam  Physical Exam   Constitutional: He is oriented to person, place, and time. No distress.   Cardiovascular: Normal rate.  An irregularly irregular rhythm present. Exam reveals no gallop and no friction rub.    No murmur heard.  Pulmonary/Chest: Effort normal and breath sounds normal. He has no wheezes. He has no rales.   Abdominal: Soft. There is no tenderness.   Musculoskeletal: He exhibits no edema.   Neurological: He is alert and oriented to person, place, and time.   Skin: He is not diaphoretic.   Nursing note and vitals reviewed.  No change in exam from 9/22/2018.    Lab Review  Lab Results   Component Value Date/Time    WBC 16.0 (H) 09/23/2018 01:56 AM    RBC 4.49 (L) 09/23/2018 01:56 AM    HEMOGLOBIN 15.2 09/23/2018 01:56 AM    HEMATOCRIT 44.2 09/23/2018 01:56 AM    MCV 98.4 (H) 09/23/2018 01:56 AM    MCH 33.9 (H) 09/23/2018 01:56 AM    MCHC 34.4 09/23/2018 01:56 AM    MPV 11.8 09/23/2018 01:56 AM      Lab Results   Component Value Date/Time    SODIUM 136 09/23/2018 01:56 AM    POTASSIUM 3.8 09/23/2018 01:56 AM    CHLORIDE 100 09/23/2018 01:56 AM    CO2 31 09/23/2018 01:56 AM    GLUCOSE 86 09/23/2018  01:56 AM    BUN 16 09/23/2018 01:56 AM    CREATININE 0.65 09/23/2018 01:56 AM      Lab Results   Component Value Date/Time    ASTSGOT 12 11/16/2013 05:20 AM    ALTSGPT 13 11/16/2013 05:20 AM     Labs reviewed.  Normal creatinine.    Cardiac Imaging and Procedures Review  Telemetry reviewed and shows atrial fibrillation, mostly with ventricular rates in the 90s-100s.  Occasionally more tachycardic.    Assessment/Plan    Atrial fibrillation: With mild rapid ventricular response, occasionally.  Will increase metoprolol to 25 mg twice daily.  Continue digoxin at current dose.  Xarelto to resume 2 weeks postop per neurosurgery recommendations.    Cardiomyopathy: New diagnosis this admission.  Suspect tachycardia mediated.  Patient will need a myocardial perfusion study but this can be performed as an outpatient as right now, we cannot plan for a coronary angiogram anyways since the patient cannot be on anticoagulation.  Continue metoprolol tartrate for now.  Can consider transition to succinate when has blood pressure room.  No ACE inhibitors or spironolactone secondary to inadequate blood pressure room.  No indication for diuretics.    Please call with questions.    Celia Rocha M.D.   Cardiologist, [facility]      ADDENDUM:  Patient continues to be in rapid ventricular response with ambulation.  Metoprolol dose increased today.    Celia Rocha MD  Cardiologist  [facility]

## 2018-09-23 NOTE — PROGRESS NOTES
Monitor Summary: afib , MS-, QRS.08, QT- with rare PVCs and couplets with 3 episodes of tachycardia to the 150s, 190, and 170 per strip from monitor room.

## 2018-09-23 NOTE — CARE PLAN
Problem: Venous Thromboembolism (VTW)/Deep Vein Thrombosis (DVT) Prevention:  Goal: Patient will participate in Venous Thrombosis (VTE)/Deep Vein Thrombosis (DVT)Prevention Measures  Outcome: PROGRESSING AS EXPECTED  SCDs in place      Problem: Skin Integrity  Goal: Risk for impaired skin integrity will decrease  Outcome: PROGRESSING AS EXPECTED  Pt's skin intact

## 2018-09-24 ENCOUNTER — PATIENT OUTREACH (OUTPATIENT)
Dept: HEALTH INFORMATION MANAGEMENT | Facility: OTHER | Age: 63
End: 2018-09-24

## 2018-09-24 PROBLEM — Z98.890 S/P LAMINECTOMY: Status: RESOLVED | Noted: 2018-09-21 | Resolved: 2018-09-24

## 2018-09-24 PROCEDURE — 99232 SBSQ HOSP IP/OBS MODERATE 35: CPT | Performed by: INTERNAL MEDICINE

## 2018-09-24 PROCEDURE — A9270 NON-COVERED ITEM OR SERVICE: HCPCS | Performed by: INTERNAL MEDICINE

## 2018-09-24 PROCEDURE — A9270 NON-COVERED ITEM OR SERVICE: HCPCS | Performed by: NEUROLOGICAL SURGERY

## 2018-09-24 PROCEDURE — 700102 HCHG RX REV CODE 250 W/ 637 OVERRIDE(OP): Performed by: INTERNAL MEDICINE

## 2018-09-24 PROCEDURE — 770020 HCHG ROOM/CARE - TELE (206)

## 2018-09-24 PROCEDURE — 700111 HCHG RX REV CODE 636 W/ 250 OVERRIDE (IP): Performed by: NEUROLOGICAL SURGERY

## 2018-09-24 PROCEDURE — 700102 HCHG RX REV CODE 250 W/ 637 OVERRIDE(OP): Performed by: NEUROLOGICAL SURGERY

## 2018-09-24 RX ORDER — ONDANSETRON 4 MG/1
4 TABLET, ORALLY DISINTEGRATING ORAL EVERY 4 HOURS PRN
Qty: 10 TAB | Refills: 0
Start: 2018-09-24

## 2018-09-24 RX ORDER — OXYCODONE HCL 5 MG/5 ML
5 SOLUTION, ORAL ORAL EVERY 4 HOURS PRN
Qty: 210 ML | Refills: 0 | Status: SHIPPED | OUTPATIENT
Start: 2018-09-24 | End: 2018-10-01

## 2018-09-24 RX ORDER — HYDRALAZINE HYDROCHLORIDE 20 MG/ML
10 INJECTION INTRAMUSCULAR; INTRAVENOUS
Refills: 0
Start: 2018-09-24

## 2018-09-24 RX ORDER — LABETALOL HYDROCHLORIDE 5 MG/ML
10 INJECTION, SOLUTION INTRAVENOUS
Refills: 0
Start: 2018-09-24

## 2018-09-24 RX ORDER — AMOXICILLIN 250 MG
1 CAPSULE ORAL
Qty: 30 TAB | Refills: 0
Start: 2018-09-24

## 2018-09-24 RX ORDER — AMOXICILLIN 250 MG
1 CAPSULE ORAL DAILY
Qty: 30 TAB | Refills: 0
Start: 2018-09-24

## 2018-09-24 RX ORDER — ENEMA 19; 7 G/133ML; G/133ML
1 ENEMA RECTAL
Start: 2018-09-24

## 2018-09-24 RX ORDER — METHOCARBAMOL 750 MG/1
750 TABLET, FILM COATED ORAL EVERY 8 HOURS PRN
Qty: 120 TAB
Start: 2018-09-24

## 2018-09-24 RX ADMIN — OMEPRAZOLE 40 MG: 20 CAPSULE, DELAYED RELEASE ORAL at 12:36

## 2018-09-24 RX ADMIN — ENOXAPARIN SODIUM 40 MG: 100 INJECTION SUBCUTANEOUS at 06:17

## 2018-09-24 RX ADMIN — DILTIAZEM HYDROCHLORIDE 30 MG: 30 TABLET, FILM COATED ORAL at 17:01

## 2018-09-24 RX ADMIN — ACETAMINOPHEN 1000 MG: 500 TABLET, FILM COATED ORAL at 12:35

## 2018-09-24 RX ADMIN — DIGOXIN 250 MCG: 250 TABLET ORAL at 06:16

## 2018-09-24 RX ADMIN — METOPROLOL TARTRATE 25 MG: 25 TABLET, FILM COATED ORAL at 17:00

## 2018-09-24 RX ADMIN — METOPROLOL TARTRATE 25 MG: 25 TABLET, FILM COATED ORAL at 06:16

## 2018-09-24 RX ADMIN — STANDARDIZED SENNA CONCENTRATE AND DOCUSATE SODIUM 1 TABLET: 8.6; 5 TABLET ORAL at 20:07

## 2018-09-24 RX ADMIN — ACETAMINOPHEN 1000 MG: 500 TABLET, FILM COATED ORAL at 06:17

## 2018-09-24 RX ADMIN — DILTIAZEM HYDROCHLORIDE 30 MG: 30 TABLET, FILM COATED ORAL at 23:31

## 2018-09-24 ASSESSMENT — PAIN SCALES - GENERAL
PAINLEVEL_OUTOF10: 3
PAINLEVEL_OUTOF10: 0

## 2018-09-24 NOTE — PROGRESS NOTES
Neurosurgery Progress Note    Subjective:  POD #5  Pain controlled  Rashid removed 9, had retention, rashid replaced  Heart Rate controlled  Denies HA  Working with PTOT  Mobilizing    Exam:  LE motor 5/5 throughout bilaterally  Incision not visualized as patient giving self meds via PEG    BP  Min: 96/64  Max: 118/79  Pulse  Av  Min: 69  Max: 94  Resp  Av.2  Min: 16  Max: 18  Temp  Av.4 °C (97.6 °F)  Min: 36.1 °C (97 °F)  Max: 36.8 °C (98.2 °F)  SpO2  Av %  Min: 91 %  Max: 94 %    No Data Recorded    Recent Labs      18   0156   WBC  16.0*   RBC  4.49*   HEMOGLOBIN  15.2   HEMATOCRIT  44.2   MCV  98.4*   MCH  33.9*   MCHC  34.4   RDW  53.0*   PLATELETCT  169   MPV  11.8     Recent Labs      18   0156   SODIUM  136   POTASSIUM  3.8   CHLORIDE  100   CO2  31   GLUCOSE  86   BUN  16   CREATININE  0.65   CALCIUM  8.2*               Intake/Output       18 0700 - 18 0659 18 0700 - 18 0659       8787-7413 Total 3784-8698 9505-6055 Total       Intake    NG/GT  --  270 270  --  -- --    Intake (mL) (Enteral Tube PEG: Percutaneous endoscopic gastrostomy Abdomen) -- 270 270 -- -- --    Enteral  30  150 180  --  -- --    Free Water / Tube Flush 30 150 180 -- -- --    Total Intake 30 420 450 -- -- --       Output    Urine  3350  3200 6550  --  -- --    Output (mL) (Urethral Catheter) 3350 3200 6550 -- -- --    Stool  --  -- --  --  -- --    Number of Times Stooled 2 x -- 2 x -- -- --    Total Output 3350 3200 6550 -- -- --       Net I/O     -3320 -2780 -6100 -- -- --            Intake/Output Summary (Last 24 hours) at 18 0821  Last data filed at 18 0400   Gross per 24 hour   Intake              450 ml   Output             6550 ml   Net            -6100 ml            • enoxaparin (LOVENOX) injection  40 mg DAILY   • metoprolol  25 mg TWICE DAILY   • oxyCODONE  5 mg Q4HRS PRN   • digoxin  250 mcg DAILY   • omeprazole 2 mg/mL in sodium bicarbonate  40 mg  DAILY   • Pharmacy Consult Request  1 Each PRN   • MD ALERT...DO NOT ADMINISTER NSAIDS or ASPIRIN unless ORDERED By Neurosurgery  1 Each PRN   • senna-docusate  1 Tab Nightly   • senna-docusate  1 Tab Q24HRS PRN   • polyethylene glycol/lytes  1 Packet BID PRN   • magnesium hydroxide  30 mL QDAY PRN   • bisacodyl  10 mg Q24HRS PRN   • fleet  1 Each Once PRN   • Respiratory Care per Protocol   Continuous RT   • acetaminophen  1,000 mg Q6HRS   • morphine injection  2 mg Once PRN   • diphenhydrAMINE  25 mg Q6HRS PRN    Or   • diphenhydrAMINE  25 mg Q6HRS PRN   • ondansetron  4 mg Q4HRS PRN   • ondansetron  4 mg Q4HRS PRN   • promethazine  12.5-25 mg Q4HRS PRN   • promethazine  12.5-25 mg Q4HRS PRN   • prochlorperazine  5-10 mg Q4HRS PRN   • methocarbamol  750 mg Q8HRS PRN   • ALPRAZolam  0.25 mg BID PRN   • labetalol  10 mg Q HOUR PRN   • hydrALAZINE  10 mg Q HOUR PRN       Assessment and Plan:  Hospital day #6  POD #5  Prophylactic anticoagulation: no    Plan:  1. Ok to t/f to Rehab when arranged  2. Continue to mobilize  3. Continue rashid-plan to d/c when patient is more mobile

## 2018-09-24 NOTE — PROGRESS NOTES
Pt much improved tonight in terms of pain and energy.  Pt states he is feeling better and looking forward to rehab.

## 2018-09-24 NOTE — PROGRESS NOTES
[facility] Cardiology Follow-up Consult Note  Date of note:    9/24/2018      Consulting Physician: Fabi Castano M.D.    Patient ID:  Name:   Chevy Angeles   YOB: 1955  Age:   62 y.o.  male   MRN:   [id number]    Chief complaint here for surgery    Interim Events:  Heart rates remain elevated with ambulation though little improved he has been able to receive his metoprolol with less concern for relative hypotension he denies any significant breathing issues or palpitations with ambulation the heart rate can easily go above 160 at rest baseline tends to be better down in the 70s on average      ROS  No NV, No Bleeding, No dizziness   All other review of systems reviewed and negative.    Past medical, surgical, social, and family history reviewed and unchanged from admission except as noted in assessment and plan.    Medications: Reviewed in MAR  Current Facility-Administered Medications   Medication Dose Frequency Provider Last Rate Last Dose   • DILTIAZem (CARDIZEM) tablet 30 mg  30 mg Q6HRS Ammon Lutz M.D.       • enoxaparin (LOVENOX) inj 40 mg  40 mg DAILY Fabi Castano M.D.   40 mg at 09/24/18 0617   • metoprolol (LOPRESSOR) tablet 25 mg  25 mg TWICE DAILY Celia Rocha M.D.   25 mg at 09/24/18 0616   • oxyCODONE (ROXICODONE) oral solution 5 mg  5 mg Q4HRS PRN Pranay Pelletier P.A.-C.   5 mg at 09/22/18 0407   • digoxin (LANOXIN) tablet 250 mcg  250 mcg DAILY Fabi Castano M.D.   250 mcg at 09/24/18 0616   • omeprazole 2 mg/mL in sodium bicarbonate (PRILOSEC) oral susp 40 mg  40 mg DAILY Fabi Castano M.D.   40 mg at 09/24/18 1236   • Pharmacy Consult Request ...Pain Management Review 1 Each  1 Each PRN Fabi Castano M.D.       • MD ALERT...DO NOT ADMINISTER NSAIDS or ASPIRIN unless ORDERED By Neurosurgery 1 Each  1 Each PRN Fabi Castano M.D.       • senna-docusate (PERICOLACE or SENOKOT S) 8.6-50 MG per tablet 1 Tab  1 Tab Nightly Fabi Castano M.D.   1 Tab at  09/23/18 1955   • senna-docusate (PERICOLACE or SENOKOT S) 8.6-50 MG per tablet 1 Tab  1 Tab Q24HRS PRN Fabi Castano M.D.   1 Tab at 09/21/18 2154   • polyethylene glycol/lytes (MIRALAX) PACKET 1 Packet  1 Packet BID PRN Fabi Castano M.D.   1 Packet at 09/23/18 1025   • magnesium hydroxide (MILK OF MAGNESIA) suspension 30 mL  30 mL QDAY PRN Fabi Castano M.D.   30 mL at 09/23/18 1025   • bisacodyl (DULCOLAX) suppository 10 mg  10 mg Q24HRS PRN Fabi Castano M.D.   10 mg at 09/23/18 1445   • fleet enema 133 mL  1 Each Once PRN Fabi Castano M.D.       • Respiratory Care per Protocol   Continuous RT Fabi Castano M.D.       • morphine (pf) 4 mg/ml injection 2 mg  2 mg Once PRN Fabi Castano M.D.       • diphenhydrAMINE (BENADRYL) tablet/capsule 25 mg  25 mg Q6HRS PRN Fabi Castano M.D.        Or   • diphenhydrAMINE (BENADRYL) injection 25 mg  25 mg Q6HRS PRN Fabi Castano M.D.       • ondansetron (ZOFRAN) syringe/vial injection 4 mg  4 mg Q4HRS PRN Fabi Castano M.D.   4 mg at 09/20/18 0049   • ondansetron (ZOFRAN ODT) dispertab 4 mg  4 mg Q4HRS PRN Fabi Castano M.D.       • promethazine (PHENERGAN) tablet 12.5-25 mg  12.5-25 mg Q4HRS PRN Fabi Castano M.D.       • promethazine (PHENERGAN) suppository 12.5-25 mg  12.5-25 mg Q4HRS PRN Fabi H S Eyad, M.D.       • prochlorperazine (COMPAZINE) injection 5-10 mg  5-10 mg Q4HRS PRN Fabi Castano M.D.       • methocarbamol (ROBAXIN) tablet 750 mg  750 mg Q8HRS PRN Fabi Castano M.D.       • ALPRAZolam (XANAX) tablet 0.25 mg  0.25 mg BID PRN Fabi Castano M.D.       • labetalol (NORMODYNE,TRANDATE) injection 10 mg  10 mg Q HOUR PRN Fabi Castano M.D.       • hydrALAZINE (APRESOLINE) injection 10 mg  10 mg Q HOUR PRN Fabi Castano M.D.         Last reviewed on 9/19/2018 10:40 PM by Emily W. Weil, R.N.  No Known Allergies    Physical Exam  Body mass index is 23.87 kg/m². Blood pressure 108/67, pulse 70, temperature 36.7 °C  "(98 °F), resp. rate 17, height 1.981 m (6' 6\"), weight 93.7 kg (206 lb 9.1 oz), SpO2 92 %. Vitals:    09/23/18 2000 09/24/18 0000 09/24/18 0400 09/24/18 0700   BP: 102/74 118/79 114/69 108/67   Pulse: 69 76 94 70   Resp: 17 16 17 17   Temp: 36.1 °C (97 °F) 36.1 °C (97 °F) 36.4 °C (97.5 °F) 36.7 °C (98 °F)   SpO2: 92% 94% 91% 92%   Weight:       Height:        Oxygen Therapy:  Pulse Oximetry: 92 %, O2 (LPM): 0, O2 Delivery: None (Room Air)    General: no apparent distress  Eyes: nl conjunctiva  ENT: OP clear  Neck: no JVD   Lungs: normal respiratory effort, CTAB  Heart: Irregular heart rates 80s, no murmurs, no rubs or gallops,   EXT no edema bilateral lower extremities. + pedal pulses. no cyanosis  Abdomen: soft, non tender, non distended, G-tube  Neurological: No focal sensory deficits  Psychiatric: Appropriate affect, A/O x 3  Skin: Warm extremities    Labs (personally reviewed and notable for):   No results found for this or any previous visit (from the past 24 hour(s)).    Cardiac Imaging and Procedures Review:    EKG and telemetry tracings personally reviewed    Impression and Medical Decision Making:  Active Problems:    Atrial fibrillation (HCC) POA: Yes    Gastrojejunostomy tube dislodgement (HCC) POA: Yes  Resolved Problems:    S/P laminectomy POA: Unknown    Atrial fibrillation chronic  Rate control has been reasonable although could likely be optimized, will try diltiazem unfortunately with his swallowing difficulty will have to receive through the PEG tube without a good daily option also need to make sure that his blood pressure can tolerate  Metoprolol 25 twice daily  Digoxin  Restart anticoagulation when possible    We will follow along normally follows with Dr. Gasca in [facility] cardiology    It is my pleasure to participate in the care of Mr. Angeles.  Please do not hesitate to contact me with questions or concerns.    Ammon Lutz MD PhD FAC  Cardiologist [facility]    9/24/2018

## 2018-09-24 NOTE — CONSULTS
"Medical chart review completed.       Assessment  · Spinal cord tumor s/p resection. Patient has  Therapy needs with max assist OT and min assist PT.   · New diagnosis of a-fib, not cleared for anticoagulation yet.     Recommendations:  · The patient is a good candidate for inpatient rehabilitation pending insurance approval, confirmation of home dispo from rehab TCC, confirmation of medical stability from neurosurgery and cardiology.         Visit Diagnoses     ICD-10-CM   1. Benign neoplasm of spinal cord (HCC) D33.4           PMH:  Past Medical History:   Diagnosis Date   • Anesthesia     \"difficulty swallowing\"   • Arrhythmia     afib   • Cancer (HCC) 2013    benign tumors   • Indigestion    • Infectious disease 2007    MRSA   • Pneumonia    • Sleep apnea     bipap       PSH:  Past Surgical History:   Procedure Laterality Date   • LUMBAR LAMINECTOMY DISKECTOMY  9/19/2018    Procedure: LUMBAR LAMINECTOMY- T12-L1 DECOMPRESSIVE LAMI AND RESECTION OF INTRADURAL EXTRAMEDULLARY TUMOR;  Surgeon: Fabi Castano M.D.;  Location: [facility];  Service: Neurosurgery   • CERVICAL LAMINECTOMY POSTERIOR  11/12/2013    Performed by Fabi Castano M.D. at [facility]   • CERVICAL DECOMPRESSION POSTERIOR  11/12/2013    Performed by Faib Castano M.D. at [facility]   • CERVICAL FORAMINOTOMY  11/12/2013    Performed by Fabi Castano M.D. at [facility]   • RECOVERY  7/29/2013    Performed by [facility] at [facility]   • THORACIC LAMINECTOMY  7/22/2013    Performed by Fabi Castano M.D. at [facility]   • LESION EXCISION NEURO  7/22/2013    Performed by Fabi Castano M.D. at [facility]   • OTHER ORTHOPEDIC SURGERY  2012    clavical   • OTHER  2010    throat surgery   • OTHER NEUROLOGICAL SURG  2007    SCHWANNOMA REMOVED FROM BRAIN STEM   • OTHER ORTHOPEDIC SURGERY  2006    LEFT ELBOW SURGERY W/HARDWARE   • OTHER ABDOMINAL " SURGERY  1999    ABD SCHWANOMA REMOVED   • OTHER ORTHOPEDIC SURGERY      RIGHT KNEE SHWANNOMA REMOVED X 2   • OTHER ORTHOPEDIC SURGERY      LEFT HIP SCHWANNOMA REMOVED   • OTHER ORTHOPEDIC SURGERY      RIGHT UPPER LEG SCHWANNOMA REMOVED   • OTHER ORTHOPEDIC SURGERY      RIGHT UPPER ARM SCHWANNOMA REMOVED       FAMILY HISTORY:  No family history on file.    MEDICATIONS:  Current Facility-Administered Medications   Medication Dose   • enoxaparin (LOVENOX) inj 40 mg  40 mg   • metoprolol (LOPRESSOR) tablet 25 mg  25 mg   • oxyCODONE (ROXICODONE) oral solution 5 mg  5 mg   • digoxin (LANOXIN) tablet 250 mcg  250 mcg   • omeprazole 2 mg/mL in sodium bicarbonate (PRILOSEC) oral susp 40 mg  40 mg   • Pharmacy Consult Request ...Pain Management Review 1 Each  1 Each   • MD ALERT...DO NOT ADMINISTER NSAIDS or ASPIRIN unless ORDERED By Neurosurgery 1 Each  1 Each   • senna-docusate (PERICOLACE or SENOKOT S) 8.6-50 MG per tablet 1 Tab  1 Tab   • senna-docusate (PERICOLACE or SENOKOT S) 8.6-50 MG per tablet 1 Tab  1 Tab   • polyethylene glycol/lytes (MIRALAX) PACKET 1 Packet  1 Packet   • magnesium hydroxide (MILK OF MAGNESIA) suspension 30 mL  30 mL   • bisacodyl (DULCOLAX) suppository 10 mg  10 mg   • fleet enema 133 mL  1 Each   • Respiratory Care per Protocol     • acetaminophen (TYLENOL) tablet 1,000 mg  1,000 mg   • morphine (pf) 4 mg/ml injection 2 mg  2 mg   • diphenhydrAMINE (BENADRYL) tablet/capsule 25 mg  25 mg    Or   • diphenhydrAMINE (BENADRYL) injection 25 mg  25 mg   • ondansetron (ZOFRAN) syringe/vial injection 4 mg  4 mg   • ondansetron (ZOFRAN ODT) dispertab 4 mg  4 mg   • promethazine (PHENERGAN) tablet 12.5-25 mg  12.5-25 mg   • promethazine (PHENERGAN) suppository 12.5-25 mg  12.5-25 mg   • prochlorperazine (COMPAZINE) injection 5-10 mg  5-10 mg   • methocarbamol (ROBAXIN) tablet 750 mg  750 mg   • ALPRAZolam (XANAX) tablet 0.25 mg  0.25 mg   • labetalol (NORMODYNE,TRANDATE) injection 10 mg  10 mg   •  hydrALAZINE (APRESOLINE) injection 10 mg  10 mg       ALLERGIES:  Patient has no known allergies.    PSYCHOSOCIAL HISTORY:  Living Site:  Per TCC  Living With:  Per TCC  Caregiver's availability:  Per TCC  Number of stairs:  Per TCC  Substance use history:  Per TCC    IMAGING        Results for orders placed during the hospital encounter of 04/08/16   MR-BRAIN-WITH & W/O    Impression Stable appearance of the brain    Stable appearance of previously noted left para-midline high convexity, left inferior pontine, and right para optic nerve masses.    No new masses identified.                                Results for orders placed during the hospital encounter of 02/19/16   MR-CERVICAL SPINE-W/O    Impression Postsurgical changes at C3 and C4. No recurrent mass seen at this level.    Minimal degenerative changes at C3-4 and C6-7        Results for orders placed during the hospital encounter of 04/08/16   MR-CERVICAL SPINE-WITH    Impression Persistent postoperative cervical spine with no significant central canal or foraminal encroachment. No abnormal enhancing lesions are noted.    Rey Aceves MD  4/8/2016 5:02 PM        Results for orders placed during the hospital encounter of 07/14/14   MR-CERVICAL SPINE-WITH & W/O    Impression 1. Interval decompressive laminectomy at C3-4 and resection of the C3 mass.    2. No residual/recurrent mass or pathologic enhancement evident.       Results for orders placed during the hospital encounter of 02/19/16   MR-LUMBAR SPINE-W/O    Impression Multiple intrathecal nodules throughout the lumbar spine. Grossly stable in size and number.    Increased size of the mass at the left S3 neural foramen.    Diminished size of the mass at the right S2 neural foramen    Aryan Valero MD  2/19/2016 4:31 PM          Results for orders placed during the hospital encounter of 04/08/16   MR-LUMBAR SPINE-WITH    Impression Stable pattern and distribution to the intradural extra medullary  multifocal mass lesions scattered throughout the thoracolumbar spine. Nerve sheath tumors would be favored.    Rey Aceves MD  4/8/2016 4:31 PM          Results for orders placed during the hospital encounter of 07/17/14   MR-LUMBAR SPINE-WITH & W/O    Impression 1.  Multiple enhancing intrathecal nodules throughout the lumbar spine are again noted. There is equivocal enlargement of some of the smaller nodules. No definite increase in number is seen.  2.  Today's examination includes caudally to approximately the S3-4 disc level and demonstrates larger enhancing mass lesions associated with the right S2 and left S3 nerve roots with prominent remodeling of the neural foramina as described in detail above.  3.  Stable enlargement and enhancement of the right L2 nerve root.          Results for orders placed during the hospital encounter of 02/19/16   MR-THORACIC SPINE-W/O    Impression Postsurgical changes at T7. No recurrent mass    Postsurgical changes from T6-T8    Previously reported small enhancing nodules within the central canal at T11-12 and T12-L1 are not visualized. Maybe secondary to exam technique      Results for orders placed during the hospital encounter of 04/08/16   MR-THORACIC SPINE-WITH    Impression Stable post surgical changes of the thoracic cord at T7, as above.    No recurrence mass identified.    Stable postsurgical changes of laminectomy from T6-T8    Stable small enhancing nodules adjacent to the spinal cord at the left posterior aspect of T11-12 and the right posterior aspect of T12-L1        Results for orders placed during the hospital encounter of 02/19/16   MR-LUMBAR SPINE-W/O    Impression Multiple intrathecal nodules throughout the lumbar spine. Grossly stable in size and number.    Increased size of the mass at the left S3 neural foramen.    Diminished size of the mass at the right S2 neural foramen    Aryan Valero MD  2/19/2016 4:31 PM          Results for orders placed during the  hospital encounter of 07/17/14   MR-THORACIC SPINE-WITH & W/O    Impression Postsurgical changes at the T7 level of the thoracic cord with resection of the previously reported mass. No recurrence identified.    Postsurgical changes of laminectomy are present at T6-T8    Stable small enhancing nodules at the left posterior aspect at T11-12 and the right posterior aspect of T12-L1. No change in size or morphology.                  Results for orders placed during the hospital encounter of 02/19/16   MR-SOFT TISSUE NECK-WITH & W/O    Impression The uvula demonstrates a prominent appearance, as was prominent enhancement but appears stable compared with prior examinations dating back to 2013    I do not appreciate any definite enhancement, morphologic abnormality involving the hard and soft palate    There is subtle asymmetric thickening of the prevertebral soft tissues tissues at the level of the epiglottis, which is believe were present a normal variant.    There is subtle convex contour of the of the posterior aspect of the left vocal cord, along with a poorly defined left arytenoid cartilage. Consideration may be given to direct visualization (if not performed already).                                                                                                                                       Therapy notes  Therapy by Isatu Neil OT at 9/21/2018  2:42 PM  Version 1 of 1    Author:  Isatu Neil OT Service:  (none) Author Type:  Occupational Therapist    Filed:  9/21/2018  2:48 PM Date of Service:  9/21/2018  2:42 PM Status:  Signed    :  Isatu Neil OT (Occupational Therapist)       Occupational Therapy Evaluation completed.   Functional Status:  Max A LB dressing, CGA standing grooming- pt washed face, brushed/flossed teeth, brushed hair, CGA UB dressing, CGA functional mobility w/ SPC  Plan of Care: Will benefit from Occupational Therapy 3 times per week  Discharge Recommendations:   "Equipment: Will Continue to Assess for Equipment Needs. Post-acute therapy Discharge to a transitional care facility for continued skilled therapy services / SNF. Pt would be a very high fall risk if he were DC'd home at this time    See \"Rehab Therapy-Acute\" Patient Summary Report for complete documentation.    Pt is a 61 y/o male who presents to acute s/p lumbar decompression T12-L1 and tumor resection. Pt found to have a-fib during sx. PMH includes G-tube and dysphasia.  No brace per chart. Pt reports he lives alone in a one story home. Pt demo'd decreased balance, functional mobility, and activity tolerance impacting his ability to perform BADLs independently and safely. Will follow for Acute OT services at this time.[AC.1]        Attribution Medrano     AC.1 - Isatu Neil OT on 9/21/2018  2:42 PM                   No notes of this type exist for this encounter.          PT 9/20/18  Physical Therapy Evaluation completed.   Bed Mobility:  Supine to Sit: Minimal Assist  Transfers: Sit to Stand: Minimal Assist  Gait: Level Of Assist: Unable to Participate; sidestepping/pre-gait only; noted this was pt's 1st attempt at standing/OOB activity and pt had small dural tear and now off bedrest per nsg/chart       Plan of Care: Will benefit from Physical Therapy 3 times per week  Discharge Recommendations: Equipment: Will Continue to Assess for Equipment Needs. See below     Pt presents to PT with impaired functional strength, balance, endurance and general locomotion s/p lumbar decompression and tumor resection. He was able to demonstrate bed mobility with min/mod A, sit<>stands with min A, and sidestepping with min A/CGA. He is generally guarded with mobility, though noted that this was pt's 1st attempt at upright/OOB activity. He will benefit from continued skilled acute PT while here and in current condition pt would require continued skilled PT/placement prior to medical dc to home. However he may begin to progress " well with increased OOB activity with continued practice with both PT and staff. WIll continue to visit.     prolonged non-face-to-face time was spent on 9/23/2018  from [id number]  by this reviewer.  This time included medical chart review, medication review, and decision making for the appropriateness of transfer to inpatient rehabilitation.  In addition to the above, time was spent coordinating care with case management as well as transitional care coordination team.      Junior Sullivan MD  Physical Medicine and Rehabilitation  [facility]  9/23/2018   7:39 PM

## 2018-09-24 NOTE — DISCHARGE SUMMARY
DATE OF ADMISSION:  09/19/2018    DATE OF Discharge:  09/26/2018    TRANSFER DIAGNOSES:  1.  Status post T12-L1 decompressive laminectomy with resection of intradural   nerve sheath tumor x2.  2.  Atrial fibrillation with rapid ventricular response.  3.  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  4.  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  5.  Status post previous resection of the T7 lesion and C3 tumor.    OPERATION PERFORMED:  See above.    REASON FOR ADMISSION:  The patient is a very pleasant 62-year-old man well   known to our office.  He has a history of multiple schwannomas and has   undergone multiple resections in the past.  He did have some sequelae   following resection of the brainstem with swallowing problems with dysphagia   and subsequently had a feeding tube placed.  He has also been left with   difficulty clearing his secretions.  He has a history of Xarelto use for   atrial fibrillation, which was stopped prior to surgery ICU as he was found to   have a lesion at the T12-L1 levels on serial MRI scans.  As those lesions   were increasing in size, he was offered the above-mentioned surgery and he did   consent.    HOSPITAL COURSE:  The patient underwent the above operation without   complication, but was found to be in atrial fibrillation with rapid   ventricular response immediately postoperative.  A cardiology consult was   placed and he was transferred to the cardiac ICU where he was assessed by Dr. Rocha in cardiology.  He was started on an amiodarone drip to help control   his rate.  He was kept flat for 24 hours following surgery.  On postoperative   day #1, he was slowly allowed to mobilize.  There was no headache.  His Oneil   catheter was removed, but he was later found to be in urinary retention and   the Oneil catheter had to be replaced.    He was kept in the cardiac ICU for 4 days  postoperatively and then he was   transferred to the neurosurgery floor.  He was again followed by cardiology   and his medications were switched from IV amiodarone to oral metoprolol and diltiazem.    He remained in urinary retention with Rashid catheter placement.  He was   assessed by physical and occupational therapy and was not a candidate. His rashid was discontinued and he was voiding. Cardiology cleared him for discharge. Home health was ordered along with a 3 in 1 commode and he was discharged home.     DISCHARGE INSTRUCTIONS:  He is to be extremely cautious with any bending,   flexing, twisting about the low back.  He should follow with rehab's   recommendation for further therapies.  He will follow with cardiology's   recommendation for ongoing oral medications to help control his atrial   fibrillation rate.  He is on Lovenox, but should restart his Xarelto 2 weeks   postoperatively.  He will have followup appointments with our office in 2 and   6 weeks' time, and he has been instructed to keep these. He will continue with metoprolol and Diltiazem on discharge. He is to follow up with his cardiologist.      DISCHARGE MEDICATIONS:  1.  Digoxin 250 mcg tablet per feeding tube daily.  2.  Dulcolax suppository 10 mg rectally every 24 hours p.r.n.  3.  Lovenox 40 mg subcutaneously injection daily.  4.  Metoprolol 25 mg per G-tube b.i.d.  5.  Methocarbamol 750 mg per feeding tube every 8 hours p.r.n. for muscle   spasms.  6.  Omeprazole 40 mg per G-tube daily.  7.  Zofran 4 mg per feeding tube every 4 hours p.r.n. for nausea.  8.  Roxicodone oral solution 5 mg per feeding tube every 4 hours p.r.n. for   pain.  9.  Senna docusate 8.5-50 mg tablet 1 daily per feeding tube p.r.n.  10. Diltiazem 60mg per G-tube q 12hours. Hold for systolic less than 90 or hear rate less than 50    I once again have answered all of his questions to the best of my ability, he   is now again stable for transfer to [facility] and we  will follow up with   him as an outpatient.       ____________________________________     AUDELIA Godinez / DWIGHT    DD:  09/24/2018 10:18:01  DT:  09/24/2018 10:52:40    D#:  [id number]  Job#:  [id number]    cc: Celia Rocha MD, LOUIS LLANOS MD, LIVIA ZARAGOZA MD, MEHDI PARKS MD,   KRYSTAL HUGHES DO

## 2018-09-24 NOTE — PROGRESS NOTES
Received report from night RN Audra. Assumed care at 0700. Pt A&Ox4. Reporting no pain, no SOB or signs of discomfort. Discussed plan of care with pt. Pt resting comfortably, treaded socks in place, bed locked and in lowest position, bed alarm on, call light in reach. No other needs at this time.

## 2018-09-24 NOTE — CARE PLAN
Problem: Safety  Goal: Will remain free from injury  Outcome: PROGRESSING AS EXPECTED  Treaded socks in place, bed alarm on, call light in reach, appropriate assistive devices used, pt calls appropriately.    Problem: Knowledge Deficit  Goal: Knowledge of disease process/condition, treatment plan, diagnostic tests, and medications will improve  Outcome: PROGRESSING AS EXPECTED  Pt educated on plan of care and disease process. All questions answered. Pt demonstrates understanding.

## 2018-09-24 NOTE — PROGRESS NOTES
Monitor summary: Afib:, VT:--, QRS:.08, QT:-- with rare PVCs and couplets per strip from monitor room.

## 2018-09-25 PROCEDURE — 700102 HCHG RX REV CODE 250 W/ 637 OVERRIDE(OP): Performed by: INTERNAL MEDICINE

## 2018-09-25 PROCEDURE — A9270 NON-COVERED ITEM OR SERVICE: HCPCS | Performed by: INTERNAL MEDICINE

## 2018-09-25 PROCEDURE — 97116 GAIT TRAINING THERAPY: CPT

## 2018-09-25 PROCEDURE — A9270 NON-COVERED ITEM OR SERVICE: HCPCS | Performed by: NEUROLOGICAL SURGERY

## 2018-09-25 PROCEDURE — 99232 SBSQ HOSP IP/OBS MODERATE 35: CPT | Performed by: INTERNAL MEDICINE

## 2018-09-25 PROCEDURE — 700102 HCHG RX REV CODE 250 W/ 637 OVERRIDE(OP): Performed by: NEUROLOGICAL SURGERY

## 2018-09-25 PROCEDURE — 700111 HCHG RX REV CODE 636 W/ 250 OVERRIDE (IP): Performed by: NEUROLOGICAL SURGERY

## 2018-09-25 PROCEDURE — 97530 THERAPEUTIC ACTIVITIES: CPT

## 2018-09-25 PROCEDURE — 97535 SELF CARE MNGMENT TRAINING: CPT

## 2018-09-25 PROCEDURE — 770020 HCHG ROOM/CARE - TELE (206)

## 2018-09-25 PROCEDURE — 700102 HCHG RX REV CODE 250 W/ 637 OVERRIDE(OP): Performed by: PHYSICIAN ASSISTANT

## 2018-09-25 PROCEDURE — A9270 NON-COVERED ITEM OR SERVICE: HCPCS | Performed by: PHYSICIAN ASSISTANT

## 2018-09-25 RX ORDER — DIPHENHYDRAMINE HCL 25 MG
25 TABLET ORAL EVERY 6 HOURS PRN
Status: DISCONTINUED | OUTPATIENT
Start: 2018-09-25 | End: 2018-09-26 | Stop reason: HOSPADM

## 2018-09-25 RX ORDER — ACETAMINOPHEN 500 MG
1000 TABLET ORAL 3 TIMES DAILY
Status: DISCONTINUED | OUTPATIENT
Start: 2018-09-25 | End: 2018-09-26 | Stop reason: HOSPADM

## 2018-09-25 RX ORDER — DIPHENHYDRAMINE HYDROCHLORIDE 50 MG/ML
25 INJECTION INTRAMUSCULAR; INTRAVENOUS EVERY 6 HOURS PRN
Status: DISCONTINUED | OUTPATIENT
Start: 2018-09-25 | End: 2018-09-26 | Stop reason: HOSPADM

## 2018-09-25 RX ORDER — POLYETHYLENE GLYCOL 3350 17 G/17G
1 POWDER, FOR SOLUTION ORAL 2 TIMES DAILY PRN
Status: DISCONTINUED | OUTPATIENT
Start: 2018-09-25 | End: 2018-09-26 | Stop reason: HOSPADM

## 2018-09-25 RX ADMIN — ENOXAPARIN SODIUM 40 MG: 100 INJECTION SUBCUTANEOUS at 05:47

## 2018-09-25 RX ADMIN — DILTIAZEM HYDROCHLORIDE 30 MG: 30 TABLET, FILM COATED ORAL at 17:51

## 2018-09-25 RX ADMIN — DIGOXIN 250 MCG: 250 TABLET ORAL at 05:46

## 2018-09-25 RX ADMIN — DILTIAZEM HYDROCHLORIDE 30 MG: 30 TABLET, FILM COATED ORAL at 05:46

## 2018-09-25 RX ADMIN — ACETAMINOPHEN 1000 MG: 500 TABLET, FILM COATED ORAL at 17:51

## 2018-09-25 RX ADMIN — OMEPRAZOLE 40 MG: 20 CAPSULE, DELAYED RELEASE ORAL at 05:47

## 2018-09-25 RX ADMIN — ACETAMINOPHEN 1000 MG: 500 TABLET, FILM COATED ORAL at 08:29

## 2018-09-25 RX ADMIN — METOPROLOL TARTRATE 25 MG: 25 TABLET, FILM COATED ORAL at 17:51

## 2018-09-25 RX ADMIN — STANDARDIZED SENNA CONCENTRATE AND DOCUSATE SODIUM 1 TABLET: 8.6; 5 TABLET ORAL at 20:45

## 2018-09-25 RX ADMIN — DILTIAZEM HYDROCHLORIDE 30 MG: 30 TABLET, FILM COATED ORAL at 12:59

## 2018-09-25 RX ADMIN — METOPROLOL TARTRATE 25 MG: 25 TABLET, FILM COATED ORAL at 05:46

## 2018-09-25 ASSESSMENT — GAIT ASSESSMENTS
ASSISTIVE DEVICE: SINGLE POINT CANE
DEVIATION: BRADYKINETIC
GAIT LEVEL OF ASSIST: SUPERVISED

## 2018-09-25 ASSESSMENT — PAIN SCALES - GENERAL
PAINLEVEL_OUTOF10: 0
PAINLEVEL_OUTOF10: 4
PAINLEVEL_OUTOF10: 0

## 2018-09-25 ASSESSMENT — COGNITIVE AND FUNCTIONAL STATUS - GENERAL
DAILY ACTIVITIY SCORE: 22
HELP NEEDED FOR BATHING: A LITTLE
SUGGESTED CMS G CODE MODIFIER DAILY ACTIVITY: CJ
TOILETING: A LITTLE

## 2018-09-25 NOTE — DISCHARGE PLANNING
Received Choice form at 02:15pm  Agency/Facility Name: [facility]  Referral sent per Choice form at 2:20 pm

## 2018-09-25 NOTE — DISCHARGE PLANNING
Dr. Wilder is agreeable as long as Chevy is still meeting [place] requirements.  Page out to [facility] PT and Kay OT requesting updated [place] evals to submit to insurance.

## 2018-09-25 NOTE — CARE PLAN
Problem: Pain Management  Goal: Pain level will decrease to patient's comfort goal  Outcome: PROGRESSING AS EXPECTED      Problem: Respiratory:  Goal: Respiratory status will improve  Outcome: PROGRESSING AS EXPECTED      Problem: Fluid Volume:  Goal: Will maintain balanced intake and output  Outcome: PROGRESSING AS EXPECTED      Problem: Urinary Elimination:  Goal: Ability to reestablish a normal urinary elimination pattern will improve  Outcome: PROGRESSING AS EXPECTED      Problem: Communication  Goal: The ability to communicate needs accurately and effectively will improve  Outcome: PROGRESSING AS EXPECTED      Problem: Safety  Goal: Will remain free from injury  Outcome: PROGRESSING AS EXPECTED      Problem: Venous Thromboembolism (VTW)/Deep Vein Thrombosis (DVT) Prevention:  Goal: Patient will participate in Venous Thrombosis (VTE)/Deep Vein Thrombosis (DVT)Prevention Measures  Outcome: PROGRESSING AS EXPECTED      Problem: Bowel/Gastric:  Goal: Normal bowel function is maintained or improved  Outcome: PROGRESSING AS EXPECTED      Problem: Knowledge Deficit  Goal: Knowledge of disease process/condition, treatment plan, diagnostic tests, and medications will improve  Outcome: PROGRESSING AS EXPECTED      Problem: Skin Integrity  Goal: Risk for impaired skin integrity will decrease  Outcome: PROGRESSING AS EXPECTED

## 2018-09-25 NOTE — DISCHARGE PLANNING
Agency/Facility Name: [facility]  Spoke To: NANNETTE Junior  Outcome: Home Health referral faxed to [phone].

## 2018-09-25 NOTE — DISCHARGE PLANNING
Medical Social Work    Anticipated Discharge Disposition: Home with Home Health     Action: LSW spoke with Krystal from [facility] in [place] ([phone]) per Krystal her boss is in a meeting and she is unable to get approval for pt this evening.  Krystal also had questions about whether pt will need PT as well as it was not indicated in the order.      LSW stated that Unit CM will follow up with her tomorrow regarding home health.     Barriers: Awaiting home health acceptance     Plan: Unit CM to follow up tomorrow with [facility] in [place]

## 2018-09-25 NOTE — PROGRESS NOTES
Monitor summary: AFIB 75-96, QRS .08, QT with 6beats of VT, R PVC's and HR to 191 and to 41 per strip from monitor room.

## 2018-09-25 NOTE — PROGRESS NOTES
[facility] Cardiology Follow-up Consult Note  Date of note:    9/25/2018      Consulting Physician: Fabi Castano M.D.    Patient ID:  Name:   Chevy Angeles   YOB: 1955  Age:   62 y.o.  male   MRN:   [id number]    Chief complaint here for surgery  Interim Events:  Patient has done well on telemetry heart rates are improved he still has increased heart rate with ambulation activities working with physical therapy currently on his needs    Telemetry shows better control of his atrial fibrillation resting heart rate in the 70s it does tach up to average 120s with ambulation but for shorter durations    He reports that he had stopped his metoprolol outpatient because of orthostatic symptoms      ROS  No NV, No Bleeding, No dizziness   All other review of systems reviewed and negative.    Past medical, surgical, social, and family history reviewed and unchanged from admission except as noted in assessment and plan.    Medications: Reviewed in MAR  Current Facility-Administered Medications   Medication Dose Frequency Provider Last Rate Last Dose   • acetaminophen (TYLENOL) tablet 1,000 mg  1,000 mg TID Pranay Pelletier, P.A.-C.   1,000 mg at 09/25/18 0829   • DILTIAZem (CARDIZEM) tablet 30 mg  30 mg Q6HRS Fabi Castano M.D.       • diphenhydrAMINE (BENADRYL) tablet/capsule 25 mg  25 mg Q6HRS PRN Fabi Castano M.D.        Or   • diphenhydrAMINE (BENADRYL) injection 25 mg  25 mg Q6HRS PRN Fabi Castano M.D.       • metoprolol (LOPRESSOR) tablet 25 mg  25 mg TWICE DAILY Fabi Castano M.D.       • polyethylene glycol/lytes (MIRALAX) PACKET 1 Packet  1 Packet BID PRN Fabi Castano M.D.       • enoxaparin (LOVENOX) inj 40 mg  40 mg DAILY Fabi Castano M.D.   40 mg at 09/25/18 0547   • oxyCODONE (ROXICODONE) oral solution 5 mg  5 mg Q4HRS PRN Pranay Pelletier, P.A.-C.   5 mg at 09/22/18 0407   • digoxin (LANOXIN) tablet 250 mcg  250 mcg DAILY Fabi Castano M.D.   250 mcg at 09/25/18 0546    • omeprazole 2 mg/mL in sodium bicarbonate (PRILOSEC) oral susp 40 mg  40 mg DAILY Fabi Castano M.D.   40 mg at 09/25/18 0547   • Pharmacy Consult Request ...Pain Management Review 1 Each  1 Each PRN Fabi Castano M.D.       • MD ALERT...DO NOT ADMINISTER NSAIDS or ASPIRIN unless ORDERED By Neurosurgery 1 Each  1 Each PRN Fabi Castano M.D.       • senna-docusate (PERICOLACE or SENOKOT S) 8.6-50 MG per tablet 1 Tab  1 Tab Nightly Fabi Castano M.D.   1 Tab at 09/24/18 2007   • senna-docusate (PERICOLACE or SENOKOT S) 8.6-50 MG per tablet 1 Tab  1 Tab Q24HRS PRN Fabi Castano M.D.   1 Tab at 09/21/18 2154   • magnesium hydroxide (MILK OF MAGNESIA) suspension 30 mL  30 mL QDAY PRN Fabi Castano M.D.   30 mL at 09/23/18 1025   • bisacodyl (DULCOLAX) suppository 10 mg  10 mg Q24HRS PRN Fabi Castano M.D.   10 mg at 09/23/18 1445   • fleet enema 133 mL  1 Each Once PRN Fabi Castano M.D.       • Respiratory Care per Protocol   Continuous RT Fabi Castano M.D.       • morphine (pf) 4 mg/ml injection 2 mg  2 mg Once PRN Fabi Castano M.D.       • ondansetron (ZOFRAN) syringe/vial injection 4 mg  4 mg Q4HRS PRN Fabi Castano M.D.   4 mg at 09/20/18 0049   • ondansetron (ZOFRAN ODT) dispertab 4 mg  4 mg Q4HRS PRN Fabi Castano M.D.       • promethazine (PHENERGAN) tablet 12.5-25 mg  12.5-25 mg Q4HRS PRN Fabi Castano M.D.       • promethazine (PHENERGAN) suppository 12.5-25 mg  12.5-25 mg Q4HRS PRN Fabi Castano M.D.       • prochlorperazine (COMPAZINE) injection 5-10 mg  5-10 mg Q4HRS PRN Fabi Castano M.D.       • methocarbamol (ROBAXIN) tablet 750 mg  750 mg Q8HRS PRN Fabi Castano M.D.       • ALPRAZolam (XANAX) tablet 0.25 mg  0.25 mg BID PRN Fabi Castano M.D.       • labetalol (NORMODYNE,TRANDATE) injection 10 mg  10 mg Q HOUR PRN Fabi Castano M.D.       • hydrALAZINE (APRESOLINE) injection 10 mg  10 mg Q HOUR PRN Fabi Castano M.D.         Last reviewed on  "9/19/2018 10:40 PM by Emily W. Weil, R.N.  No Known Allergies    Physical Exam  Body mass index is 23.87 kg/m². Blood pressure 112/82, pulse 98, temperature 36.8 °C (98.3 °F), resp. rate 18, height 1.981 m (6' 6\"), weight 93.7 kg (206 lb 9.1 oz), SpO2 97 %. Vitals:    09/24/18 2000 09/25/18 0400 09/25/18 0700 09/25/18 0809   BP: 119/83 117/77 112/82    Pulse: 91 97 76 98   Resp: 20 17 16 18   Temp: 36.6 °C (97.8 °F) 37.1 °C (98.8 °F) 36.8 °C (98.3 °F)    SpO2: 98% 94% 92% 97%   Weight:       Height:        Oxygen Therapy:  Pulse Oximetry: 97 %, O2 (LPM): 0, O2 Delivery: None (Room Air)    General: no apparent distress  Eyes: nl conjunctiva  ENT: OP clear  Neck: no JVD   Lungs: normal respiratory effort, CTAB  Heart: Irregular, no murmurs, no rubs or gallops,   EXT no edema bilateral lower extremities. + pedal pulses. no cyanosis  Abdomen: soft, non tender, non distended,  Neurological: No focal sensory deficits  Psychiatric: Appropriate affect, A/O x 3  Skin: Warm extremities    Labs (personally reviewed and notable for):   No results found for this or any previous visit (from the past 24 hour(s)).    Cardiac Imaging and Procedures Review:    EKG and telemetry tracings personally reviewed    Impression and Medical Decision Making:  Active Problems:    Atrial fibrillation (HCC) POA: Yes    Gastrojejunostomy tube dislodgement (HCC) POA: Yes  Resolved Problems:    S/P laminectomy POA: Unknown      Atrial fibrillation chronic  Rate control is improved with the addition of diltiazem he can be discharged on this dose if the frequency is too much she can go to 60 twice daily of the short acting with his PEG tube  Metoprolol 25 twice daily  Digoxin  Restart anticoagulation when possible  We discussed over the long-term if he continues to have orthostatic symptoms and rates are all over the place he could consider more invasive evaluation by our EP service as an outpatient for ablation possibly or pacemaker and AVJ " ablation     Nonsustained VT on telemetry  Reinforce importance of metoprolol    He is safe to be discharged from a cardiovascular perspective normally follows with Dr. Gasca in [facility] cardiology       It is my pleasure to participate in the care of Mr. Angeles.  Please do not hesitate to contact me with questions or concerns.    Ammon Lutz MD PhD [facility]  Cardiologist [facility]    9/25/2018

## 2018-09-25 NOTE — PROGRESS NOTES
Pt assessed, VS stable. Medications passed. Patient updated on plan of care for the shift. Bed low and locked, call light within reach. Hourly rounding in place. All needs met at this time. Will continue to monitor.

## 2018-09-25 NOTE — FACE TO FACE
Face to Face Supporting Documentation - Home Health    The encounter with this patient was in whole or in part the primary reason for home health admission.    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 Leaving home requires a considerable and taxing effort. There is a normal inability to leave the home.    Community Physician to provide follow up care: Arpit Aquino D.O.     Optional Interventions? No      I certify the face to face encounter for this home health care referral meets the CMS requirements and the encounter/clinical assessment with the patient was, in whole, or in part, for the medical condition(s) listed above, which is the primary reason for home health care. Based on my clinical findings: the service(s) are medically necessary, support the need for home health care, and the homebound criteria are met.  I certify that this patient has had a face to face encounter by myself.  Pranay Pelletier P.A.-C. - NPI: [id number]

## 2018-09-25 NOTE — PROGRESS NOTES
Monitor room called stating pt had a 1.7 second pause with HR of 35. I paged Dr. Lutz and updated him he said he will review the EKG strips, but otherwise the pt is tolerating cardiac meds and is cleared for rehab.

## 2018-09-25 NOTE — DISCHARGE PLANNING
Medical Social Work    Anticipated Discharge Disposition: Home with Home Health     Action: LSW spoke with Bill Ibarra ([phone]) who states that they have received referral and have forwarded it to the Director for review.  Bill referral coordinator states she will call this  directly with approval or denial as CCA's will be going home soon.     Barriers: Awaiting Home Health Acceptance     Plan: LSW to await a call back from Bill

## 2018-09-25 NOTE — CARE PLAN
Problem: Infection  Goal: Will remain free from infection  Outcome: PROGRESSING AS EXPECTED  VSS, afebrile, labs WDL. Proper hand hygiene performed during pt care. No s/s of infection.     Problem: Knowledge Deficit  Goal: Knowledge of disease process/condition, treatment plan, diagnostic tests, and medications will improve  Outcome: PROGRESSING AS EXPECTED  Pt educated on plan of care and disease process. All questions answered. Pt demonstrates understanding.

## 2018-09-25 NOTE — THERAPY
"Physical Therapy Treatment completed.   Bed Mobility:  Supine to Sit: Supervised  Transfers: Sit to Stand: Supervised  Gait: Level Of Assist: Supervised with Single Point Cane       Plan of Care: Patient with no further skilled PT needs in the acute care setting at this time and Patient demonstrates safety with mobility in this environment at this time.   Discharge Recommendations: Equipment: No Equipment Needed. Post-acute therapy Discharge to home with outpatient or home health for additional skilled therapy services.     See \"Rehab Therapy-Acute\" Patient Summary Report for complete documentation.       "

## 2018-09-25 NOTE — THERAPY
"Occupational Therapy Treatment completed with focus on ADLs, ADL transfers and patient education.  Functional Status:    Pt in bed when OT entered. Demonstrated log roll for bed mobility with supv, sit><stand with supv. Supv for LB dressing with Min A to thread catheter through pant leg - pt demonstrated this task while maintaining precautions. Completed standing grooming with supv.  Plan of Care: Will benefit from Occupational Therapy 2 times per week  Discharge Recommendations:  Equipment Will Continue to Assess for Equipment Needs. Post-acute therapy Discharge to home with outpatient or home health for additional skilled therapy services.    See \"Rehab Therapy-Acute\" Patient Summary Report for complete documentation.     Pt seen today for OT treatment to assess for discharge recommendations. Pt mobilizing well and maintaining precautions with functional mobility and ADL. Supv level for LB dressing and standing grooming. Discussed various pieces of adaptive equipment with patient, particularly a toilet riser vs BSC - pt given equipment resource handout. Pt also expressed issues over going home re: bringing package of tube feed into the home as well as plumbing issues. Pt plans to speak with friend about receiving assistance to bring heavy package into the home and was educated on steps to take for plumbing issues. Outside of this, pt is completing functional tasks at supv level and I anticipate that he will be able to return home, highly recommend HH OT consult.   "

## 2018-09-25 NOTE — DISCHARGE PLANNING
Chevy does not require PT/ST TX.  2 of 3 disciplines are warranted for RIRF criteria.  Current recommendation is for Post-acute therapy Discharge to home with outpatient or home health for additional skilled therapy services. Msg left for  @ 8040.

## 2018-09-25 NOTE — DISCHARGE PLANNING
Msg placed to Dr. Wilder for a review as Chevy lives alone in a single level home with a ramp.  If Dr. Wilder is agreeable, will need updated [place] evals to submit to insurance.

## 2018-09-25 NOTE — PROGRESS NOTES
Neurosurgery Progress Note    Subjective:  POD #6  Pain controlled  Rashid removed , had retention, rashid replaced  Heart Rate continues to be monitored  Denies HA  Mobilizing    Exam:  LE motor 5/5 throughout bilaterally  Wound C/D/I    BP  Min: 111/65  Max: 119/83  Pulse  Av.6  Min: 76  Max: 98  Resp  Av.6  Min: 16  Max: 20  Temp  Av.9 °C (98.4 °F)  Min: 36.6 °C (97.8 °F)  Max: 37.1 °C (98.8 °F)  SpO2  Av.4 %  Min: 91 %  Max: 98 %    No Data Recorded    Recent Labs      18   0156   WBC  16.0*   RBC  4.49*   HEMOGLOBIN  15.2   HEMATOCRIT  44.2   MCV  98.4*   MCH  33.9*   MCHC  34.4   RDW  53.0*   PLATELETCT  169   MPV  11.8     Recent Labs      18   0156   SODIUM  136   POTASSIUM  3.8   CHLORIDE  100   CO2  31   GLUCOSE  86   BUN  16   CREATININE  0.65   CALCIUM  8.2*               Intake/Output       18 0700 - 18 0659 18 0700 - 18 0659      [id number] 1180-2463 Total 5642-2424 8608-7274 Total       Intake    Other  --  120 120  --  -- --    Medications (PO/Enteral Liquids) -- 120 120 -- -- --    NG/GT  --  2760 2760  --  -- --    Intake (mL) (Enteral Tube PEG: Percutaneous endoscopic gastrostomy Abdomen) -- 2760 2760 -- -- --    Enteral  --  120 120  --  -- --    Free Water / Tube Flush -- 120 120 -- -- --    Total Intake -- 3000 3000 -- -- --       Output    Urine  2700  1500 4200  --  -- --    Output (mL) (Urethral Catheter) 2700 1500 4200 -- -- --    Total Output 2700 1500 4200 -- -- --       Net I/O     -2700 1500 -1200 -- -- --            Intake/Output Summary (Last 24 hours) at 18 09  Last data filed at 18 0513   Gross per 24 hour   Intake             3000 ml   Output             4200 ml   Net            -1200 ml            • acetaminophen  1,000 mg TID   • DILTIAZem  30 mg Q6HRS   • enoxaparin (LOVENOX) injection  40 mg DAILY   • metoprolol  25 mg TWICE DAILY   • oxyCODONE  5 mg Q4HRS PRN   • digoxin  250 mcg DAILY   • omeprazole 2  mg/mL in sodium bicarbonate  40 mg DAILY   • Pharmacy Consult Request  1 Each PRN   • MD ALERT...DO NOT ADMINISTER NSAIDS or ASPIRIN unless ORDERED By Neurosurgery  1 Each PRN   • senna-docusate  1 Tab Nightly   • senna-docusate  1 Tab Q24HRS PRN   • polyethylene glycol/lytes  1 Packet BID PRN   • magnesium hydroxide  30 mL QDAY PRN   • bisacodyl  10 mg Q24HRS PRN   • fleet  1 Each Once PRN   • Respiratory Care per Protocol   Continuous RT   • morphine injection  2 mg Once PRN   • diphenhydrAMINE  25 mg Q6HRS PRN    Or   • diphenhydrAMINE  25 mg Q6HRS PRN   • ondansetron  4 mg Q4HRS PRN   • ondansetron  4 mg Q4HRS PRN   • promethazine  12.5-25 mg Q4HRS PRN   • promethazine  12.5-25 mg Q4HRS PRN   • prochlorperazine  5-10 mg Q4HRS PRN   • methocarbamol  750 mg Q8HRS PRN   • ALPRAZolam  0.25 mg BID PRN   • labetalol  10 mg Q HOUR PRN   • hydrALAZINE  10 mg Q HOUR PRN       Assessment and Plan:  Hospital day #7  POD #6  Prophylactic anticoagulation: no    Plan:  1. Ok to t/f to Rehab when arranged  2. Continue to mobilize  3. Continue rashid-plan to d/c when patient is more mobile  4. Work with PT again today

## 2018-09-25 NOTE — PROGRESS NOTES
Received report from night RN. Assumed care at 1900. Pt A&Ox4. Reporting no pain, no SOB or signs of discomfort. Discussed plan of care with pt. Pt resting comfortably, treaded socks in place, bed locked and in lowest position, bed alarm on, call light in reach. No other needs at this time.

## 2018-09-26 VITALS
WEIGHT: 206.57 LBS | OXYGEN SATURATION: 91 % | HEART RATE: 74 BPM | DIASTOLIC BLOOD PRESSURE: 74 MMHG | SYSTOLIC BLOOD PRESSURE: 113 MMHG | BODY MASS INDEX: 23.9 KG/M2 | HEIGHT: 78 IN | TEMPERATURE: 98.4 F | RESPIRATION RATE: 16 BRPM

## 2018-09-26 PROCEDURE — 700102 HCHG RX REV CODE 250 W/ 637 OVERRIDE(OP): Performed by: NEUROLOGICAL SURGERY

## 2018-09-26 PROCEDURE — A9270 NON-COVERED ITEM OR SERVICE: HCPCS | Performed by: NEUROLOGICAL SURGERY

## 2018-09-26 PROCEDURE — A9270 NON-COVERED ITEM OR SERVICE: HCPCS | Performed by: PHYSICIAN ASSISTANT

## 2018-09-26 PROCEDURE — 700102 HCHG RX REV CODE 250 W/ 637 OVERRIDE(OP): Performed by: PHYSICIAN ASSISTANT

## 2018-09-26 PROCEDURE — 700111 HCHG RX REV CODE 636 W/ 250 OVERRIDE (IP): Performed by: NEUROLOGICAL SURGERY

## 2018-09-26 RX ADMIN — OMEPRAZOLE 40 MG: 20 CAPSULE, DELAYED RELEASE ORAL at 05:19

## 2018-09-26 RX ADMIN — DILTIAZEM HYDROCHLORIDE 30 MG: 30 TABLET, FILM COATED ORAL at 00:13

## 2018-09-26 RX ADMIN — DIGOXIN 250 MCG: 250 TABLET ORAL at 05:18

## 2018-09-26 RX ADMIN — DILTIAZEM HYDROCHLORIDE 30 MG: 30 TABLET, FILM COATED ORAL at 05:18

## 2018-09-26 RX ADMIN — ENOXAPARIN SODIUM 40 MG: 100 INJECTION SUBCUTANEOUS at 05:19

## 2018-09-26 RX ADMIN — ACETAMINOPHEN 1000 MG: 500 TABLET, FILM COATED ORAL at 05:18

## 2018-09-26 RX ADMIN — METOPROLOL TARTRATE 25 MG: 25 TABLET, FILM COATED ORAL at 05:18

## 2018-09-26 NOTE — DISCHARGE PLANNING
Anticipated Discharge Disposition:   Home with Home Health    Action:   Called [place] at [facility] to verify if pt has been accepted.   Per Krystal at [facility], pt has been accepted  Text message to Pranay Pelletier to add PT    Barriers to Discharge:   none    Plan: get choice for the 3:1 BSC then pt can be dc home.   BSC will be delivered to home.       Addendum:  BSC choice made for [employer] [facility].   Faxed to [facility].  CM asked to have it delivered to pt's home.     Pt lives alone.   Information on private pay caregivers like [employer] given to pt.   Pt has friends that will pick him up today at around 12 noon.   Provided pt the contact number for [facility] also.

## 2018-09-26 NOTE — DISCHARGE INSTRUCTIONS
Discharge Instructions    Discharged to home by car with friend. Discharged via wheelchair, hospital escort: Yes.  Special equipment needed: FWW    Be sure to schedule a follow-up appointment with your primary care doctor or any specialists as instructed.     Discharge Plan:   Influenza Vaccine Indication: Indicated: 9 to 64 years of age  Influenza Vaccine Given - only chart on this line when given: Influenza Vaccine Given (See MAR)    I understand that a diet low in cholesterol, fat, and sodium is recommended for good health. Unless I have been given specific instructions below for another diet, I accept this instruction as my diet prescription.   Other diet: Tube feed    Special Instructions: None    · Is patient discharged on Warfarin / Coumadin?   No         Atrial Fibrillation  Introduction  Atrial fibrillation is a type of heartbeat that is irregular or fast (rapid). If you have this condition, your heart keeps quivering in a weird (chaotic) way. This condition can make it so your heart cannot pump blood normally. Having this condition gives a person more risk for stroke, heart failure, and other heart problems. There are different types of atrial fibrillation. Talk with your doctor to learn about the type that you have.  Follow these instructions at home:  · Take over-the-counter and prescription medicines only as told by your doctor.  · If your doctor prescribed a blood-thinning medicine, take it exactly as told. Taking too much of it can cause bleeding. If you do not take enough of it, you will not have the protection that you need against stroke and other problems.  · Do not use any tobacco products. These include cigarettes, chewing tobacco, and e-cigarettes. If you need help quitting, ask your doctor.  · If you have apnea (obstructive sleep apnea), manage it as told by your doctor.  · Do not drink alcohol.  · Do not drink beverages that have caffeine. These include coffee, soda, and tea.  · Maintain a  healthy weight. Do not use diet pills unless your doctor says they are safe for you. Diet pills may make heart problems worse.  · Follow diet instructions as told by your doctor.  · Exercise regularly as told by your doctor.  · Keep all follow-up visits as told by your doctor. This is important.  Contact a doctor if:  · You notice a change in the speed, rhythm, or strength of your heartbeat.  · You are taking a blood-thinning medicine and you notice more bruising.  · You get tired more easily when you move or exercise.  Get help right away if:  · You have pain in your chest or your belly (abdomen).  · You have sweating or weakness.  · You feel sick to your stomach (nauseous).  · You notice blood in your throw up (vomit), poop (stool), or pee (urine).  · You are short of breath.  · You suddenly have swollen feet and ankles.  · You feel dizzy.  · Your suddenly get weak or numb in your face, arms, or legs, especially if it happens on one side of your body.  · You have trouble talking, trouble understanding, or both.  · Your face or your eyelid droops on one side.  These symptoms may be an emergency. Do not wait to see if the symptoms will go away. Get medical help right away. Call your local emergency services (911 in the [place] Do not drive yourself to the hospital.   This information is not intended to replace advice given to you by your health care provider. Make sure you discuss any questions you have with your health care provider.  Document Released: 09/26/2009 Document Revised: 05/25/2017 Document Reviewed: 04/13/2016  © 2017 Elsevier        Lumbar Laminectomy, Care After  Refer to this sheet in the next few weeks. These instructions provide you with information on caring for yourself after your procedure. Your health care provider may also give you more specific instructions. Your treatment has been planned according to current medical practices, but problems sometimes occur. Call your health care provider if  you have any problems or questions after your procedure.  HOME CARE INSTRUCTIONS   · Check the incision twice a day for signs of infection. Some signs may include a foul-smelling, greenish or yellowish discharge from the wound, increased pain, or increased redness over the incision.  · Change your bandages about 24-36 hours after surgery, or as directed.  · You may shower once the bandage is removed, or as directed. Avoid tub baths, swimming, and hot tubs for 3 weeks or until your incision has healed completely. If you have stitches or staples, they may be removed 2-3 weeks after surgery, or as directed by your doctor.  · Daily exercise is helpful to prevent the return of problems. Walking is permitted. You may use a treadmill without an incline. Cut down on activities and exercise if you have discomfort. You may also go up and down stairs as much as you can tolerate.  · Do not lift anything heavier than 15 lb. Avoid bending or twisting at the waist. Always bend your knees.  · Maintain strength and range of motion as instructed.  · Do not drive for 2-3 weeks, or as directed by your doctor. You may be a passenger for 20-30 minutes at a time. Lying back in the passenger seat may be more comfortable for you.  · Limit your sitting to intervals of 20-30 minutes. You should lie down or walk in between sitting periods. There are no limitations for sitting in a recliner chair.  · Only take over-the-counter or prescription medicines for pain, discomfort, or fever as directed by your health care provider.  SEEK MEDICAL CARE IF:   · There is increased bleeding (more than a small spot) from the wound.  · You notice redness, swelling, or increasing pain in the wound.  · Pus is coming from the wound.  · You have a fever for more than 2-3 days.  · You notice a foul smell coming from the wound or dressing.  · You have increasing pain in your wound.  SEEK IMMEDIATE MEDICAL CARE IF:   · You develop a rash.  · You have difficulty  breathing.  · You have any allergic problems.  · You develop a headache or stiff neck that does not respond to pain relievers.  · You are unable to urinate.  · You develop new onset of pain, numbness, or weakness in the buttocks or lower extremities.  MAKE SURE YOU:   · Understand these instructions.  · Will watch your condition.  · Will get help right away if you are not doing well or get worse.     This information is not intended to replace advice given to you by your health care provider. Make sure you discuss any questions you have with your health care provider.     Document Released: 11/21/2005 Document Revised: 08/20/2014 Document Reviewed: 05/15/2014  [employer] Patient Education ©2016 [employer].    Atrial Flutter  Atrial flutter is a type of abnormal heart rhythm (arrhythmia). In atrial flutter, the heartbeat is fast but regular. There are two types of atrial flutter:  · Paroxysmal atrial flutter. This type starts suddenly. It usually stops on its own soon after it starts.  · Permanent atrial flutter. This type does not go away.  What are the causes?  This condition may be caused by:  · A heart condition or problem, such as:  ¨ A heart attack.  ¨ Heart failure.  ¨ A heart valve problem.  · A lung problem, such as:  ¨ A blood clot in the lungs (pulmonary embolism, or PE).  ¨ Chronic obstructive pulmonary disease.  · Poorly controlled high blood pressure (hypertension).  · Hyperthyroidism.  · Caffeine.  · Some decongestant cold medicines.  · Low levels of minerals called electrolytes in the blood.  · Cocaine.  What increases the risk?  This condition is more likely to develop in:  · Elderly adults.  · Men.  What are the signs or symptoms?  Symptoms of this condition include:  · A feeling that your heart is pounding or racing (palpitations).  · Shortness of breath.  · Chest pain.  · Feeling light-headed.  · Dizziness.  · Fainting.  How is this diagnosed?  This condition may be diagnosed with  tests, including:  · An electrocardiogram (ECG). This is a painless test that records electrical signals in the heart.  · Holter monitoring. For this test, you wear a device that records your heartbeat for 1-2 days.  · Cardiac event monitoring. For this test, you wear a device that records your heartbeat for up to 30 days.  · An echocardiogram. This is a painless test that uses sound waves to make a picture of your heart.  · Stress test. This test records your heartbeat while you exercise.  · Blood tests.  How is this treated?  This condition may be treated with:  · Treatment of any underlying conditions.  · Medicine to make your heart beat more slowly.  · Medicine to keep the condition from coming back.  · A procedure to keep the condition under control. Some procedures to do this include:  ¨ Cardioversion. During this procedure, medicines or an electrical shock are given to make the heart beat normally.  ¨ Ablation. During this procedure, the heart tissue that is causing the problem is destroyed. This procedure may be done if atrial flutter lasts a long time or happens often.  Follow these instructions at home:  · Take over-the-counter and prescription medicines only as told by your health care provider.  · Do not take any new medicines without talking to your health care provider.  · Do not use tobacco products, including cigarettes, chewing tobacco, or e-cigarettes. If you need help quitting, ask your health care provider.  · Limit alcohol intake to no more than 1 drink per day for nonpregnant women and 2 drinks per day for men. One drink equals 12 oz of beer, 5 oz of wine, or 1½ oz of hard liquor.  · Try to reduce any stress. Stress can make your symptoms worse.  Contact a health care provider if:  · Your symptoms get worse.  Get help right away if:  · You are dizzy.  · You feel like fainting or you faint.  · You have shortness of breath.  · You feel pain or pressure in your chest.  · You suddenly feel nauseous  or you suddenly vomit.  · There is a sudden change in your ability to speak, eat, or move.  · You are sweating a lot for no reason.  This information is not intended to replace advice given to you by your health care provider. Make sure you discuss any questions you have with your health care provider.  Document Released: 05/06/2010 Document Revised: 04/26/2017 Document Reviewed: 07/01/2016  [employer] Interactive Patient Education © 2017 [employer]      Depression / Suicide Risk    As you are discharged from this [facility] facility, it is important to learn how to keep safe from harming yourself.    Recognize the warning signs:  · Abrupt changes in personality, positive or negative- including increase in energy   · Giving away possessions  · Change in eating patterns- significant weight changes-  positive or negative  · Change in sleeping patterns- unable to sleep or sleeping all the time   · Unwillingness or inability to communicate  · Depression  · Unusual sadness, discouragement and loneliness  · Talk of wanting to die  · Neglect of personal appearance   · Rebelliousness- reckless behavior  · Withdrawal from people/activities they love  · Confusion- inability to concentrate     If you or a loved one observes any of these behaviors or has concerns about self-harm, here's what you can do:  · Talk about it- your feelings and reasons for harming yourself  · Remove any means that you might use to hurt yourself (examples: pills, rope, extension cords, firearm)  · Get professional help from the community (Mental Health, Substance Abuse, psychological counseling)  · Do not be alone:Call your Safe Contact- someone whom you trust who will be there for you.  · Call your local CRISIS HOTLINE 184-2688 or 551-756-7149  · Call your local Children's Mobile Crisis Response Team [place] (428) 080-3233 or www.[employer]  · Call the toll free National Suicide Prevention Hotlines   · National Suicide Prevention  Lifeline 164-577-YTIY (5221)  · National [place] Line Network 800-SUICIDE (810-5929)

## 2018-09-26 NOTE — CARE PLAN
Problem: Pain Management  Goal: Pain level will decrease to patient's comfort goal  Outcome: PROGRESSING AS EXPECTED      Problem: Urinary Elimination:  Goal: Ability to reestablish a normal urinary elimination pattern will improve  Outcome: PROGRESSING AS EXPECTED      Problem: Safety  Goal: Will remain free from injury  Outcome: PROGRESSING AS EXPECTED      Problem: Knowledge Deficit  Goal: Knowledge of disease process/condition, treatment plan, diagnostic tests, and medications will improve  Outcome: PROGRESSING AS EXPECTED

## 2018-09-26 NOTE — PROGRESS NOTES
Monitor Summary: Afib , ID-, QRS.08, QT- with rare PVCs, two bradycardic episodes to 40 and 39, and two tachycardic episodes to 208 and 192 per strip from monitor room.

## 2018-09-26 NOTE — PROGRESS NOTES
Neurosurgery Progress Note    Subjective:  POD #7  Pain controlled  Oneil removed   -voiding  Heart Rate continues to be monitored  Denies HA  Mobilizing  Would like to go home    Exam:  LE motor 5/5 throughout bilaterally  Wound C/D/I    BP  Min: 110/78  Max: 118/83  Pulse  Av.2  Min: 53  Max: 98  Resp  Av.4  Min: 16  Max: 18  Temp  Av.7 °C (98 °F)  Min: 36.2 °C (97.1 °F)  Max: 37.1 °C (98.7 °F)  SpO2  Av.8 %  Min: 92 %  Max: 97 %    No Data Recorded                      Intake/Output       18 - 18 - 18 Total  Total       Intake    Other  --  60 60  --  -- --    Medications (PO/Enteral Liquids) -- 60 60 -- -- --    NG/GT  --  180 180  --  -- --    Intake (mL) (Enteral Tube PEG: Percutaneous endoscopic gastrostomy Abdomen) -- 180 180 -- -- --    Total Intake -- 240 240 -- -- --       Output    Urine  2800  2225 5025  --  -- --    Urine Void (mL) -- 1525 1525 -- -- --    Output (mL) ([REMOVED] Urethral Catheter) 2800 700 3500 -- -- --    Stool  --  -- --  --  -- --    Number of Times Stooled -- 1 x 1 x -- -- --    Total Output 2800 2225 5025 -- -- --       Net I/O     -280 -- -- --            Intake/Output Summary (Last 24 hours) at 18 0746  Last data filed at 18 0300   Gross per 24 hour   Intake              240 ml   Output             5025 ml   Net            -4785 ml            • acetaminophen  1,000 mg TID   • DILTIAZem  30 mg Q6HRS   • diphenhydrAMINE  25 mg Q6HRS PRN    Or   • diphenhydrAMINE  25 mg Q6HRS PRN   • metoprolol  25 mg TWICE DAILY   • polyethylene glycol/lytes  1 Packet BID PRN   • enoxaparin (LOVENOX) injection  40 mg DAILY   • oxyCODONE  5 mg Q4HRS PRN   • digoxin  250 mcg DAILY   • omeprazole 2 mg/mL in sodium bicarbonate  40 mg DAILY   • Pharmacy Consult Request  1 Each PRN   • MD ALERT...DO NOT ADMINISTER NSAIDS or ASPIRIN unless ORDERED By Neurosurgery  1 Each  PRN   • senna-docusate  1 Tab Nightly   • senna-docusate  1 Tab Q24HRS PRN   • magnesium hydroxide  30 mL QDAY PRN   • bisacodyl  10 mg Q24HRS PRN   • fleet  1 Each Once PRN   • Respiratory Care per Protocol   Continuous RT   • morphine injection  2 mg Once PRN   • ondansetron  4 mg Q4HRS PRN   • ondansetron  4 mg Q4HRS PRN   • promethazine  12.5-25 mg Q4HRS PRN   • promethazine  12.5-25 mg Q4HRS PRN   • prochlorperazine  5-10 mg Q4HRS PRN   • methocarbamol  750 mg Q8HRS PRN   • ALPRAZolam  0.25 mg BID PRN   • labetalol  10 mg Q HOUR PRN   • hydrALAZINE  10 mg Q HOUR PRN       Assessment and Plan:  Hospital day #8  POD #7  Prophylactic anticoagulation: no    Plan:  1. D/C home today  2. Will need 3 in 1 commode on d/c  3. HH needs to be arranged  4. Diltiazem and metoprolol scripts on D/C

## 2018-09-26 NOTE — DISCHARGE PLANNING
Care Transition Team Assessment    Information Source  Orientation : Oriented x 4  Who is responsible for making decisions for patient? : Patient         Elopement Risk  Legal Hold: No  Ambulatory or Self Mobile in Wheelchair: Yes  Disoriented: No  Psychiatric Symptoms: None  History of Wandering: No  Elopement this Admit: No  Vocalizing Wanting to Leave: No  Displays Behaviors, Body Language Wanting to Leave: No-Not at Risk for Elopement  Elopement Risk: Not at Risk for Elopement    Interdisciplinary Discharge Planning  Does Admitting Nurse Feel This Could be a Complex Discharge?: No  Primary Care Physician: Dr. Aquino  Lives with - Patient's Self Care Capacity: Friends  Patient or legal guardian wants to designate a caregiver (see row info): No  Housing / Facility: [address]  Do You Take your Prescribed Medications Regularly: Yes  Able to Return to Previous ADL's: Yes  Mobility Issues: No  Prior Services: None  Patient Expects to be Discharged to:: home with   Assistance Needed: No    Discharge Preparedness  What is your plan after discharge?: Home with help  What are your discharge supports?: Other (comment)  Prior Functional Level: Ambulatory, Independent with Activities of Daily Living    Functional Assesment  Prior Functional Level: Ambulatory, Independent with Activities of Daily Living    Finances  Financial Barriers to Discharge: No  Prescription Coverage: Yes    Vision / Hearing Impairment  Right Eye Vision: Wears Glasses  Left Eye Vision: Wears Glasses    Values / Beliefs / Concerns  Values / Beliefs Concerns : No  Spiritual Requests During Hospitalization: No         Domestic Abuse  Physical Abuse or Sexual Abuse: No  Verbal Abuse or Emotional Abuse: No  Possible Abuse Reported to:: Not Applicable              Anticipated Discharge Information  Anticipated discharge disposition: [facility]

## 2018-09-26 NOTE — DISCHARGE PLANNING
Received Choice form at 10:00 am  Agency/Facility Name: [facility]  Referral sent per Choice form @ 10:01 am.

## 2021-06-14 ENCOUNTER — HOSPITAL ENCOUNTER (OUTPATIENT)
Dept: RADIOLOGY | Facility: MEDICAL CENTER | Age: 66
End: 2021-06-14
Payer: COMMERCIAL

## 2025-03-10 ENCOUNTER — APPOINTMENT (OUTPATIENT)
Dept: ADMISSIONS | Facility: MEDICAL CENTER | Age: 70
End: 2025-03-10
Attending: NEUROLOGICAL SURGERY
Payer: MEDICARE

## 2025-03-14 ENCOUNTER — PRE-ADMISSION TESTING (OUTPATIENT)
Dept: ADMISSIONS | Facility: MEDICAL CENTER | Age: 70
End: 2025-03-14
Attending: NEUROLOGICAL SURGERY
Payer: MEDICARE

## 2025-03-14 RX ORDER — NUT TX, LACT-REDUCED, IRON 0.09G-2.25
2 LIQUID (ML) ORAL 3 TIMES DAILY
COMMUNITY

## 2025-03-14 NOTE — PREADMIT AVS NOTE
Current Medications   Medication Instructions    Nutritional Supplements (BOOST VERY HIGH CALORIE) Liquid Stop 8 hours before surgery    rivaroxaban (XARELTO) 20 MG Tab tablet Follow instructions from surgeon or specialist.         metoprolol (LOPRESSOR) 25 MG Tab Continue taking medication as prescribed, including morning of procedure     enoxaparin (LOVENOX) 40 MG/0.4ML Solution inj Follow instructions from surgeon or specialist.                                                      lansoprazole (PREVACID) 30 MG CPDR Continue taking medication as prescribed, including morning of procedure

## 2025-03-28 ENCOUNTER — PRE-ADMISSION TESTING (OUTPATIENT)
Dept: ADMISSIONS | Facility: MEDICAL CENTER | Age: 70
End: 2025-03-28
Attending: NEUROLOGICAL SURGERY
Payer: MEDICARE

## 2025-03-28 ENCOUNTER — HOSPITAL ENCOUNTER (OUTPATIENT)
Dept: RADIOLOGY | Facility: MEDICAL CENTER | Age: 70
End: 2025-03-28
Attending: NEUROLOGICAL SURGERY
Payer: MEDICARE

## 2025-03-28 DIAGNOSIS — D49.7 INTRADURAL EXTRAMEDULLARY SPINAL TUMOR: ICD-10-CM

## 2025-03-28 LAB
25(OH)D3 SERPL-MCNC: 37 NG/ML (ref 30–100)
ABO GROUP BLD: NORMAL
ANION GAP SERPL CALC-SCNC: 10 MMOL/L (ref 7–16)
APPEARANCE UR: CLEAR
APTT PPP: 29.7 SEC (ref 24.7–36)
BASOPHILS # BLD AUTO: 0.7 % (ref 0–1.8)
BASOPHILS # BLD: 0.04 K/UL (ref 0–0.12)
BILIRUB UR QL STRIP.AUTO: NEGATIVE
BLD GP AB SCN SERPL QL: NORMAL
BUN SERPL-MCNC: 19 MG/DL (ref 8–22)
CALCIUM SERPL-MCNC: 9.2 MG/DL (ref 8.5–10.5)
CHLORIDE SERPL-SCNC: 99 MMOL/L (ref 96–112)
CO2 SERPL-SCNC: 27 MMOL/L (ref 20–33)
COLOR UR: YELLOW
CREAT SERPL-MCNC: 0.85 MG/DL (ref 0.5–1.4)
EKG IMPRESSION: NORMAL
EOSINOPHIL # BLD AUTO: 0.21 K/UL (ref 0–0.51)
EOSINOPHIL NFR BLD: 3.5 % (ref 0–6.9)
ERYTHROCYTE [DISTWIDTH] IN BLOOD BY AUTOMATED COUNT: 47.3 FL (ref 35.9–50)
EST. AVERAGE GLUCOSE BLD GHB EST-MCNC: 123 MG/DL
GFR SERPLBLD CREATININE-BSD FMLA CKD-EPI: 94 ML/MIN/1.73 M 2
GLUCOSE SERPL-MCNC: 78 MG/DL (ref 65–99)
GLUCOSE UR STRIP.AUTO-MCNC: NEGATIVE MG/DL
HBA1C MFR BLD: 5.9 % (ref 4–5.6)
HCT VFR BLD AUTO: 47.8 % (ref 42–52)
HGB BLD-MCNC: 16.1 G/DL (ref 14–18)
IMM GRANULOCYTES # BLD AUTO: 0.05 K/UL (ref 0–0.11)
IMM GRANULOCYTES NFR BLD AUTO: 0.8 % (ref 0–0.9)
INR PPP: 1.15 (ref 0.87–1.13)
KETONES UR STRIP.AUTO-MCNC: NEGATIVE MG/DL
LEUKOCYTE ESTERASE UR QL STRIP.AUTO: NEGATIVE
LYMPHOCYTES # BLD AUTO: 0.82 K/UL (ref 1–4.8)
LYMPHOCYTES NFR BLD: 13.7 % (ref 22–41)
MCH RBC QN AUTO: 33.9 PG (ref 27–33)
MCHC RBC AUTO-ENTMCNC: 33.7 G/DL (ref 32.3–36.5)
MCV RBC AUTO: 100.6 FL (ref 81.4–97.8)
MICRO URNS: NORMAL
MONOCYTES # BLD AUTO: 1.05 K/UL (ref 0–0.85)
MONOCYTES NFR BLD AUTO: 17.5 % (ref 0–13.4)
NEUTROPHILS # BLD AUTO: 3.82 K/UL (ref 1.82–7.42)
NEUTROPHILS NFR BLD: 63.8 % (ref 44–72)
NITRITE UR QL STRIP.AUTO: NEGATIVE
NRBC # BLD AUTO: 0 K/UL
NRBC BLD-RTO: 0 /100 WBC (ref 0–0.2)
PH UR STRIP.AUTO: 6.5 [PH] (ref 5–8)
PLATELET # BLD AUTO: 191 K/UL (ref 164–446)
PMV BLD AUTO: 10.7 FL (ref 9–12.9)
POTASSIUM SERPL-SCNC: 4.4 MMOL/L (ref 3.6–5.5)
PROT UR QL STRIP: NEGATIVE MG/DL
PROTHROMBIN TIME: 14.7 SEC (ref 12–14.6)
RBC # BLD AUTO: 4.75 M/UL (ref 4.7–6.1)
RBC UR QL AUTO: NEGATIVE
RH BLD: NORMAL
SODIUM SERPL-SCNC: 136 MMOL/L (ref 135–145)
SP GR UR STRIP.AUTO: 1.01
UROBILINOGEN UR STRIP.AUTO-MCNC: 1 EU/DL
WBC # BLD AUTO: 6 K/UL (ref 4.8–10.8)

## 2025-03-28 PROCEDURE — 83036 HEMOGLOBIN GLYCOSYLATED A1C: CPT

## 2025-03-28 PROCEDURE — 80048 BASIC METABOLIC PNL TOTAL CA: CPT

## 2025-03-28 PROCEDURE — 71046 X-RAY EXAM CHEST 2 VIEWS: CPT

## 2025-03-28 PROCEDURE — 81003 URINALYSIS AUTO W/O SCOPE: CPT

## 2025-03-28 PROCEDURE — 85025 COMPLETE CBC W/AUTO DIFF WBC: CPT

## 2025-03-28 PROCEDURE — 93010 ELECTROCARDIOGRAM REPORT: CPT | Performed by: INTERNAL MEDICINE

## 2025-03-28 PROCEDURE — 36415 COLL VENOUS BLD VENIPUNCTURE: CPT

## 2025-03-28 PROCEDURE — 86901 BLOOD TYPING SEROLOGIC RH(D): CPT

## 2025-03-28 PROCEDURE — 82306 VITAMIN D 25 HYDROXY: CPT

## 2025-03-28 PROCEDURE — 86850 RBC ANTIBODY SCREEN: CPT

## 2025-03-28 PROCEDURE — 86900 BLOOD TYPING SEROLOGIC ABO: CPT

## 2025-03-28 PROCEDURE — 85610 PROTHROMBIN TIME: CPT

## 2025-03-28 PROCEDURE — 85730 THROMBOPLASTIN TIME PARTIAL: CPT

## 2025-03-28 PROCEDURE — 93005 ELECTROCARDIOGRAM TRACING: CPT | Mod: TC

## 2025-04-06 ENCOUNTER — ANESTHESIA EVENT (OUTPATIENT)
Dept: SURGERY | Facility: MEDICAL CENTER | Age: 70
DRG: 029 | End: 2025-04-06
Payer: MEDICARE

## 2025-04-07 ENCOUNTER — APPOINTMENT (OUTPATIENT)
Dept: RADIOLOGY | Facility: MEDICAL CENTER | Age: 70
DRG: 029 | End: 2025-04-07
Attending: NEUROLOGICAL SURGERY
Payer: MEDICARE

## 2025-04-07 ENCOUNTER — HOSPITAL ENCOUNTER (INPATIENT)
Facility: MEDICAL CENTER | Age: 70
LOS: 3 days | DRG: 029 | End: 2025-04-10
Attending: NEUROLOGICAL SURGERY | Admitting: NEUROLOGICAL SURGERY
Payer: MEDICARE

## 2025-04-07 ENCOUNTER — ANESTHESIA (OUTPATIENT)
Dept: SURGERY | Facility: MEDICAL CENTER | Age: 70
DRG: 029 | End: 2025-04-07
Payer: MEDICARE

## 2025-04-07 DIAGNOSIS — D33.4 BENIGN NEOPLASM OF SPINAL CORD (HCC): ICD-10-CM

## 2025-04-07 DIAGNOSIS — G89.18 POSTOPERATIVE PAIN AFTER SPINAL SURGERY: ICD-10-CM

## 2025-04-07 DIAGNOSIS — T85.528A GASTROJEJUNOSTOMY TUBE DISLODGEMENT: ICD-10-CM

## 2025-04-07 DIAGNOSIS — D49.7 INTRADURAL EXTRAMEDULLARY SPINAL TUMOR: ICD-10-CM

## 2025-04-07 DIAGNOSIS — D49.7 THORACIC INTRADURAL EXTRAMEDULLARY TUMOR: ICD-10-CM

## 2025-04-07 DIAGNOSIS — M54.16 LUMBAR RADICULOPATHY: ICD-10-CM

## 2025-04-07 LAB
CRP SERPL HS-MCNC: <0.3 MG/DL (ref 0–0.75)
PATHOLOGY CONSULT NOTE: NORMAL
PREALB SERPL-MCNC: 21.4 MG/DL (ref 18–38)

## 2025-04-07 PROCEDURE — 160041 HCHG SURGERY MINUTES - EA ADDL 1 MIN LEVEL 4: Performed by: NEUROLOGICAL SURGERY

## 2025-04-07 PROCEDURE — 160015 HCHG STAT PREOP MINUTES: Performed by: NEUROLOGICAL SURGERY

## 2025-04-07 PROCEDURE — 00BT0ZZ EXCISION OF SPINAL MENINGES, OPEN APPROACH: ICD-10-PCS | Performed by: NEUROLOGICAL SURGERY

## 2025-04-07 PROCEDURE — 700101 HCHG RX REV CODE 250: Performed by: PHYSICIAN ASSISTANT

## 2025-04-07 PROCEDURE — 110371 HCHG SHELL REV 272: Performed by: NEUROLOGICAL SURGERY

## 2025-04-07 PROCEDURE — 700101 HCHG RX REV CODE 250: Performed by: NEUROLOGICAL SURGERY

## 2025-04-07 PROCEDURE — 95940 IONM IN OPERATNG ROOM 15 MIN: CPT | Performed by: NEUROLOGICAL SURGERY

## 2025-04-07 PROCEDURE — 69990 MICROSURGERY ADD-ON: CPT | Mod: ASROC | Performed by: PHYSICIAN ASSISTANT

## 2025-04-07 PROCEDURE — 700111 HCHG RX REV CODE 636 W/ 250 OVERRIDE (IP): Performed by: PHYSICIAN ASSISTANT

## 2025-04-07 PROCEDURE — 700111 HCHG RX REV CODE 636 W/ 250 OVERRIDE (IP): Performed by: NEUROLOGICAL SURGERY

## 2025-04-07 PROCEDURE — 700102 HCHG RX REV CODE 250 W/ 637 OVERRIDE(OP): Performed by: ANESTHESIOLOGY

## 2025-04-07 PROCEDURE — 160009 HCHG ANES TIME/MIN: Performed by: NEUROLOGICAL SURGERY

## 2025-04-07 PROCEDURE — 700106 HCHG RX REV CODE 271: Performed by: NEUROLOGICAL SURGERY

## 2025-04-07 PROCEDURE — A9270 NON-COVERED ITEM OR SERVICE: HCPCS | Performed by: ANESTHESIOLOGY

## 2025-04-07 PROCEDURE — 36415 COLL VENOUS BLD VENIPUNCTURE: CPT

## 2025-04-07 PROCEDURE — 95938 SOMATOSENSORY TESTING: CPT | Performed by: NEUROLOGICAL SURGERY

## 2025-04-07 PROCEDURE — 72080 X-RAY EXAM THORACOLMB 2/> VW: CPT

## 2025-04-07 PROCEDURE — 700111 HCHG RX REV CODE 636 W/ 250 OVERRIDE (IP): Performed by: ANESTHESIOLOGY

## 2025-04-07 PROCEDURE — 01N80ZZ RELEASE THORACIC NERVE, OPEN APPROACH: ICD-10-PCS | Performed by: NEUROLOGICAL SURGERY

## 2025-04-07 PROCEDURE — A9270 NON-COVERED ITEM OR SERVICE: HCPCS | Performed by: NEUROLOGICAL SURGERY

## 2025-04-07 PROCEDURE — 95861 NEEDLE EMG 2 EXTREMITIES: CPT | Performed by: NEUROLOGICAL SURGERY

## 2025-04-07 PROCEDURE — 63281 BX/EXC IDRL SPINE LESN THRC: CPT | Mod: 22ROC | Performed by: NEUROLOGICAL SURGERY

## 2025-04-07 PROCEDURE — 95937 NEUROMUSCULAR JUNCTION TEST: CPT | Performed by: NEUROLOGICAL SURGERY

## 2025-04-07 PROCEDURE — 110454 HCHG SHELL REV 250: Performed by: NEUROLOGICAL SURGERY

## 2025-04-07 PROCEDURE — 69990 MICROSURGERY ADD-ON: CPT | Performed by: NEUROLOGICAL SURGERY

## 2025-04-07 PROCEDURE — 160002 HCHG RECOVERY MINUTES (STAT): Performed by: NEUROLOGICAL SURGERY

## 2025-04-07 PROCEDURE — 88307 TISSUE EXAM BY PATHOLOGIST: CPT | Mod: 26 | Performed by: PATHOLOGY

## 2025-04-07 PROCEDURE — 88305 TISSUE EXAM BY PATHOLOGIST: CPT | Performed by: PATHOLOGY

## 2025-04-07 PROCEDURE — 88360 TUMOR IMMUNOHISTOCHEM/MANUAL: CPT | Performed by: PATHOLOGY

## 2025-04-07 PROCEDURE — 86140 C-REACTIVE PROTEIN: CPT

## 2025-04-07 PROCEDURE — A4306 DRUG DELIVERY SYSTEM <=50 ML: HCPCS | Performed by: NEUROLOGICAL SURGERY

## 2025-04-07 PROCEDURE — 95939 C MOTOR EVOKED UPR&LWR LIMBS: CPT | Performed by: NEUROLOGICAL SURGERY

## 2025-04-07 PROCEDURE — 160035 HCHG PACU - 1ST 60 MINS PHASE I: Performed by: NEUROLOGICAL SURGERY

## 2025-04-07 PROCEDURE — 160048 HCHG OR STATISTICAL LEVEL 1-5: Performed by: NEUROLOGICAL SURGERY

## 2025-04-07 PROCEDURE — 700105 HCHG RX REV CODE 258: Performed by: NEUROLOGICAL SURGERY

## 2025-04-07 PROCEDURE — 88307 TISSUE EXAM BY PATHOLOGIST: CPT | Performed by: PATHOLOGY

## 2025-04-07 PROCEDURE — 84134 ASSAY OF PREALBUMIN: CPT

## 2025-04-07 PROCEDURE — 88360 TUMOR IMMUNOHISTOCHEM/MANUAL: CPT | Mod: 26 | Performed by: PATHOLOGY

## 2025-04-07 PROCEDURE — 700101 HCHG RX REV CODE 250: Performed by: ANESTHESIOLOGY

## 2025-04-07 PROCEDURE — 770001 HCHG ROOM/CARE - MED/SURG/GYN PRIV*

## 2025-04-07 PROCEDURE — 160029 HCHG SURGERY MINUTES - 1ST 30 MINS LEVEL 4: Performed by: NEUROLOGICAL SURGERY

## 2025-04-07 PROCEDURE — 88305 TISSUE EXAM BY PATHOLOGIST: CPT | Mod: 26 | Performed by: PATHOLOGY

## 2025-04-07 PROCEDURE — 63281 BX/EXC IDRL SPINE LESN THRC: CPT | Mod: ASROC,22ROC | Performed by: PHYSICIAN ASSISTANT

## 2025-04-07 PROCEDURE — 700105 HCHG RX REV CODE 258: Performed by: PHYSICIAN ASSISTANT

## 2025-04-07 PROCEDURE — 700105 HCHG RX REV CODE 258: Performed by: ANESTHESIOLOGY

## 2025-04-07 PROCEDURE — 700102 HCHG RX REV CODE 250 W/ 637 OVERRIDE(OP): Performed by: NEUROLOGICAL SURGERY

## 2025-04-07 RX ORDER — HYDROMORPHONE HYDROCHLORIDE 1 MG/ML
0.2 INJECTION, SOLUTION INTRAMUSCULAR; INTRAVENOUS; SUBCUTANEOUS
Status: DISCONTINUED | OUTPATIENT
Start: 2025-04-07 | End: 2025-04-07 | Stop reason: HOSPADM

## 2025-04-07 RX ORDER — LIDOCAINE HYDROCHLORIDE 20 MG/ML
INJECTION, SOLUTION EPIDURAL; INFILTRATION; INTRACAUDAL; PERINEURAL PRN
Status: DISCONTINUED | OUTPATIENT
Start: 2025-04-07 | End: 2025-04-07 | Stop reason: SURG

## 2025-04-07 RX ORDER — LANSOPRAZOLE 30 MG/1
30 TABLET, ORALLY DISINTEGRATING, DELAYED RELEASE ORAL DAILY
Status: DISCONTINUED | OUTPATIENT
Start: 2025-04-08 | End: 2025-04-10 | Stop reason: HOSPADM

## 2025-04-07 RX ORDER — AMOXICILLIN 250 MG
1 CAPSULE ORAL
Status: DISCONTINUED | OUTPATIENT
Start: 2025-04-07 | End: 2025-04-07

## 2025-04-07 RX ORDER — ALPRAZOLAM 0.25 MG
0.25 TABLET ORAL 2 TIMES DAILY PRN
Status: DISCONTINUED | OUTPATIENT
Start: 2025-04-07 | End: 2025-04-10 | Stop reason: HOSPADM

## 2025-04-07 RX ORDER — HYDROMORPHONE HYDROCHLORIDE 2 MG/ML
INJECTION, SOLUTION INTRAMUSCULAR; INTRAVENOUS; SUBCUTANEOUS PRN
Status: DISCONTINUED | OUTPATIENT
Start: 2025-04-07 | End: 2025-04-07 | Stop reason: SURG

## 2025-04-07 RX ORDER — POLYETHYLENE GLYCOL 3350 17 G/17G
1 POWDER, FOR SOLUTION ORAL 2 TIMES DAILY PRN
Status: DISCONTINUED | OUTPATIENT
Start: 2025-04-07 | End: 2025-04-10 | Stop reason: HOSPADM

## 2025-04-07 RX ORDER — HALOPERIDOL 5 MG/ML
1 INJECTION INTRAMUSCULAR
Status: DISCONTINUED | OUTPATIENT
Start: 2025-04-07 | End: 2025-04-07 | Stop reason: HOSPADM

## 2025-04-07 RX ORDER — DEXAMETHASONE SODIUM PHOSPHATE 4 MG/ML
4 INJECTION, SOLUTION INTRA-ARTICULAR; INTRALESIONAL; INTRAMUSCULAR; INTRAVENOUS; SOFT TISSUE EVERY 6 HOURS
Status: COMPLETED | OUTPATIENT
Start: 2025-04-07 | End: 2025-04-07

## 2025-04-07 RX ORDER — ACETAMINOPHEN 500 MG
1000 TABLET ORAL EVERY 6 HOURS PRN
Status: DISCONTINUED | OUTPATIENT
Start: 2025-04-12 | End: 2025-04-07

## 2025-04-07 RX ORDER — AMOXICILLIN 250 MG
1 CAPSULE ORAL NIGHTLY
Status: DISCONTINUED | OUTPATIENT
Start: 2025-04-07 | End: 2025-04-07

## 2025-04-07 RX ORDER — DOCUSATE SODIUM 100 MG/1
100 CAPSULE, LIQUID FILLED ORAL 2 TIMES DAILY
Status: DISCONTINUED | OUTPATIENT
Start: 2025-04-07 | End: 2025-04-07

## 2025-04-07 RX ORDER — SODIUM PHOSPHATE,MONO-DIBASIC 19G-7G/118
1 ENEMA (ML) RECTAL
Status: DISCONTINUED | OUTPATIENT
Start: 2025-04-07 | End: 2025-04-10 | Stop reason: HOSPADM

## 2025-04-07 RX ORDER — POLYETHYLENE GLYCOL 3350 17 G/17G
1 POWDER, FOR SOLUTION ORAL 2 TIMES DAILY PRN
Status: DISCONTINUED | OUTPATIENT
Start: 2025-04-07 | End: 2025-04-07

## 2025-04-07 RX ORDER — DIPHENHYDRAMINE HCL 25 MG
25 TABLET ORAL EVERY 6 HOURS PRN
Status: DISCONTINUED | OUTPATIENT
Start: 2025-04-07 | End: 2025-04-10 | Stop reason: HOSPADM

## 2025-04-07 RX ORDER — ONDANSETRON 4 MG/1
4 TABLET, ORALLY DISINTEGRATING ORAL EVERY 4 HOURS PRN
Status: DISCONTINUED | OUTPATIENT
Start: 2025-04-07 | End: 2025-04-10 | Stop reason: HOSPADM

## 2025-04-07 RX ORDER — SODIUM CHLORIDE, SODIUM LACTATE, POTASSIUM CHLORIDE, AND CALCIUM CHLORIDE .6; .31; .03; .02 G/100ML; G/100ML; G/100ML; G/100ML
IRRIGANT IRRIGATION
Status: DISCONTINUED | OUTPATIENT
Start: 2025-04-07 | End: 2025-04-07 | Stop reason: HOSPADM

## 2025-04-07 RX ORDER — SUCCINYLCHOLINE CHLORIDE 20 MG/ML
INJECTION INTRAMUSCULAR; INTRAVENOUS PRN
Status: DISCONTINUED | OUTPATIENT
Start: 2025-04-07 | End: 2025-04-07 | Stop reason: SURG

## 2025-04-07 RX ORDER — DOCUSATE SODIUM 50 MG/5ML
100 LIQUID ORAL 2 TIMES DAILY
Status: DISCONTINUED | OUTPATIENT
Start: 2025-04-07 | End: 2025-04-10 | Stop reason: HOSPADM

## 2025-04-07 RX ORDER — METOPROLOL SUCCINATE 25 MG/1
25 TABLET, EXTENDED RELEASE ORAL DAILY
Status: DISCONTINUED | OUTPATIENT
Start: 2025-04-07 | End: 2025-04-07

## 2025-04-07 RX ORDER — ACETAMINOPHEN 10 MG/ML
1 INJECTION, SOLUTION INTRAVENOUS ONCE
Status: COMPLETED | OUTPATIENT
Start: 2025-04-07 | End: 2025-04-07

## 2025-04-07 RX ORDER — ONDANSETRON 2 MG/ML
4 INJECTION INTRAMUSCULAR; INTRAVENOUS
Status: DISCONTINUED | OUTPATIENT
Start: 2025-04-07 | End: 2025-04-07 | Stop reason: HOSPADM

## 2025-04-07 RX ORDER — ACETAMINOPHEN 500 MG
1000 TABLET ORAL EVERY 6 HOURS
Status: DISCONTINUED | OUTPATIENT
Start: 2025-04-07 | End: 2025-04-10 | Stop reason: HOSPADM

## 2025-04-07 RX ORDER — ONDANSETRON 4 MG/1
4 TABLET, ORALLY DISINTEGRATING ORAL EVERY 4 HOURS PRN
Status: DISCONTINUED | OUTPATIENT
Start: 2025-04-07 | End: 2025-04-07

## 2025-04-07 RX ORDER — DIPHENHYDRAMINE HYDROCHLORIDE 50 MG/ML
25 INJECTION, SOLUTION INTRAMUSCULAR; INTRAVENOUS EVERY 6 HOURS PRN
Status: DISCONTINUED | OUTPATIENT
Start: 2025-04-07 | End: 2025-04-07

## 2025-04-07 RX ORDER — MORPHINE SULFATE 4 MG/ML
2 INJECTION INTRAVENOUS
Status: DISCONTINUED | OUTPATIENT
Start: 2025-04-07 | End: 2025-04-08

## 2025-04-07 RX ORDER — CEFAZOLIN SODIUM 1 G/3ML
INJECTION, POWDER, FOR SOLUTION INTRAMUSCULAR; INTRAVENOUS
Status: DISCONTINUED | OUTPATIENT
Start: 2025-04-07 | End: 2025-04-07 | Stop reason: HOSPADM

## 2025-04-07 RX ORDER — ALBUTEROL SULFATE 5 MG/ML
2.5 SOLUTION RESPIRATORY (INHALATION)
Status: DISCONTINUED | OUTPATIENT
Start: 2025-04-07 | End: 2025-04-07 | Stop reason: HOSPADM

## 2025-04-07 RX ORDER — CEFAZOLIN SODIUM 1 G/3ML
2 INJECTION, POWDER, FOR SOLUTION INTRAMUSCULAR; INTRAVENOUS ONCE
Status: COMPLETED | OUTPATIENT
Start: 2025-04-07 | End: 2025-04-07

## 2025-04-07 RX ORDER — ETHYL ALCOHOL 62 %
1 SWAB, MEDICATED TOPICAL 2 TIMES DAILY
Status: DISCONTINUED | OUTPATIENT
Start: 2025-04-07 | End: 2025-04-10 | Stop reason: HOSPADM

## 2025-04-07 RX ORDER — ACETAMINOPHEN 500 MG
1000 TABLET ORAL EVERY 6 HOURS PRN
Status: DISCONTINUED | OUTPATIENT
Start: 2025-04-12 | End: 2025-04-10 | Stop reason: HOSPADM

## 2025-04-07 RX ORDER — HYDRALAZINE HYDROCHLORIDE 20 MG/ML
5 INJECTION INTRAMUSCULAR; INTRAVENOUS
Status: DISCONTINUED | OUTPATIENT
Start: 2025-04-07 | End: 2025-04-07 | Stop reason: HOSPADM

## 2025-04-07 RX ORDER — METOPROLOL TARTRATE 25 MG/1
25 TABLET, FILM COATED ORAL 2 TIMES DAILY
Status: DISCONTINUED | OUTPATIENT
Start: 2025-04-07 | End: 2025-04-10 | Stop reason: HOSPADM

## 2025-04-07 RX ORDER — VANCOMYCIN HYDROCHLORIDE 1 G/20ML
INJECTION, POWDER, LYOPHILIZED, FOR SOLUTION INTRAVENOUS
Status: COMPLETED | OUTPATIENT
Start: 2025-04-07 | End: 2025-04-07

## 2025-04-07 RX ORDER — EPHEDRINE SULFATE 50 MG/ML
5 INJECTION, SOLUTION INTRAVENOUS
Status: DISCONTINUED | OUTPATIENT
Start: 2025-04-07 | End: 2025-04-07 | Stop reason: HOSPADM

## 2025-04-07 RX ORDER — OXYCODONE HCL 5 MG/5 ML
5 SOLUTION, ORAL ORAL
Status: DISCONTINUED | OUTPATIENT
Start: 2025-04-07 | End: 2025-04-07 | Stop reason: HOSPADM

## 2025-04-07 RX ORDER — DIPHENHYDRAMINE HYDROCHLORIDE 50 MG/ML
12.5 INJECTION, SOLUTION INTRAMUSCULAR; INTRAVENOUS
Status: DISCONTINUED | OUTPATIENT
Start: 2025-04-07 | End: 2025-04-07 | Stop reason: HOSPADM

## 2025-04-07 RX ORDER — AMOXICILLIN 250 MG
1 CAPSULE ORAL NIGHTLY
Status: DISCONTINUED | OUTPATIENT
Start: 2025-04-07 | End: 2025-04-10 | Stop reason: HOSPADM

## 2025-04-07 RX ORDER — ONDANSETRON 2 MG/ML
4 INJECTION INTRAMUSCULAR; INTRAVENOUS EVERY 4 HOURS PRN
Status: DISCONTINUED | OUTPATIENT
Start: 2025-04-07 | End: 2025-04-10 | Stop reason: HOSPADM

## 2025-04-07 RX ORDER — PHENYLEPHRINE HYDROCHLORIDE 10 MG/ML
INJECTION, SOLUTION INTRAMUSCULAR; INTRAVENOUS; SUBCUTANEOUS PRN
Status: DISCONTINUED | OUTPATIENT
Start: 2025-04-07 | End: 2025-04-07 | Stop reason: SURG

## 2025-04-07 RX ORDER — ONDANSETRON 2 MG/ML
INJECTION INTRAMUSCULAR; INTRAVENOUS PRN
Status: DISCONTINUED | OUTPATIENT
Start: 2025-04-07 | End: 2025-04-07 | Stop reason: SURG

## 2025-04-07 RX ORDER — LORATADINE 10 MG/1
10 TABLET ORAL
Status: DISCONTINUED | OUTPATIENT
Start: 2025-04-07 | End: 2025-04-07

## 2025-04-07 RX ORDER — MAGNESIUM HYDROXIDE 1200 MG/15ML
LIQUID ORAL
Status: COMPLETED | OUTPATIENT
Start: 2025-04-07 | End: 2025-04-07

## 2025-04-07 RX ORDER — BISACODYL 10 MG
10 SUPPOSITORY, RECTAL RECTAL
Status: DISCONTINUED | OUTPATIENT
Start: 2025-04-07 | End: 2025-04-10 | Stop reason: HOSPADM

## 2025-04-07 RX ORDER — LORATADINE 10 MG/1
10 TABLET ORAL
Status: DISCONTINUED | OUTPATIENT
Start: 2025-04-07 | End: 2025-04-10 | Stop reason: HOSPADM

## 2025-04-07 RX ORDER — LORATADINE 10 MG/1
10 TABLET ORAL
COMMUNITY

## 2025-04-07 RX ORDER — ACETAMINOPHEN 500 MG
1000 TABLET ORAL EVERY 6 HOURS
Status: DISCONTINUED | OUTPATIENT
Start: 2025-04-07 | End: 2025-04-07

## 2025-04-07 RX ORDER — METOPROLOL SUCCINATE 25 MG/1
25 TABLET, EXTENDED RELEASE ORAL DAILY
COMMUNITY

## 2025-04-07 RX ORDER — DIPHENHYDRAMINE HYDROCHLORIDE 50 MG/ML
25 INJECTION, SOLUTION INTRAMUSCULAR; INTRAVENOUS EVERY 6 HOURS PRN
Status: DISCONTINUED | OUTPATIENT
Start: 2025-04-07 | End: 2025-04-10 | Stop reason: HOSPADM

## 2025-04-07 RX ORDER — BUPIVACAINE HYDROCHLORIDE AND EPINEPHRINE 5; 5 MG/ML; UG/ML
INJECTION, SOLUTION EPIDURAL; INTRACAUDAL; PERINEURAL
Status: DISCONTINUED | OUTPATIENT
Start: 2025-04-07 | End: 2025-04-07 | Stop reason: HOSPADM

## 2025-04-07 RX ORDER — SODIUM CHLORIDE, SODIUM LACTATE, POTASSIUM CHLORIDE, CALCIUM CHLORIDE 600; 310; 30; 20 MG/100ML; MG/100ML; MG/100ML; MG/100ML
INJECTION, SOLUTION INTRAVENOUS CONTINUOUS
Status: ACTIVE | OUTPATIENT
Start: 2025-04-07 | End: 2025-04-07

## 2025-04-07 RX ORDER — LACTOSE-REDUCED FOOD 0.04G-1/ML
2 LIQUID (ML) ORAL 3 TIMES DAILY
Status: DISCONTINUED | OUTPATIENT
Start: 2025-04-07 | End: 2025-04-07

## 2025-04-07 RX ORDER — HYDROMORPHONE HYDROCHLORIDE 1 MG/ML
0.4 INJECTION, SOLUTION INTRAMUSCULAR; INTRAVENOUS; SUBCUTANEOUS
Status: DISCONTINUED | OUTPATIENT
Start: 2025-04-07 | End: 2025-04-07 | Stop reason: HOSPADM

## 2025-04-07 RX ORDER — TRANEXAMIC ACID 100 MG/ML
INJECTION, SOLUTION INTRAVENOUS PRN
Status: DISCONTINUED | OUTPATIENT
Start: 2025-04-07 | End: 2025-04-07 | Stop reason: SURG

## 2025-04-07 RX ORDER — ALPRAZOLAM 0.25 MG
0.25 TABLET ORAL 2 TIMES DAILY PRN
Status: DISCONTINUED | OUTPATIENT
Start: 2025-04-07 | End: 2025-04-07

## 2025-04-07 RX ORDER — SODIUM CHLORIDE AND POTASSIUM CHLORIDE 150; 900 MG/100ML; MG/100ML
INJECTION, SOLUTION INTRAVENOUS CONTINUOUS
Status: DISPENSED | OUTPATIENT
Start: 2025-04-07 | End: 2025-04-08

## 2025-04-07 RX ORDER — DIPHENHYDRAMINE HCL 25 MG
25 TABLET ORAL EVERY 6 HOURS PRN
Status: DISCONTINUED | OUTPATIENT
Start: 2025-04-07 | End: 2025-04-07

## 2025-04-07 RX ORDER — ACETAMINOPHEN 500 MG
1000 TABLET ORAL ONCE
Status: DISCONTINUED | OUTPATIENT
Start: 2025-04-07 | End: 2025-04-07

## 2025-04-07 RX ORDER — AMOXICILLIN 250 MG
1 CAPSULE ORAL
Status: DISCONTINUED | OUTPATIENT
Start: 2025-04-07 | End: 2025-04-10 | Stop reason: HOSPADM

## 2025-04-07 RX ORDER — SODIUM CHLORIDE, SODIUM LACTATE, POTASSIUM CHLORIDE, CALCIUM CHLORIDE 600; 310; 30; 20 MG/100ML; MG/100ML; MG/100ML; MG/100ML
INJECTION, SOLUTION INTRAVENOUS CONTINUOUS
Status: DISCONTINUED | OUTPATIENT
Start: 2025-04-07 | End: 2025-04-07 | Stop reason: HOSPADM

## 2025-04-07 RX ORDER — LABETALOL HYDROCHLORIDE 5 MG/ML
5 INJECTION, SOLUTION INTRAVENOUS
Status: DISCONTINUED | OUTPATIENT
Start: 2025-04-07 | End: 2025-04-07 | Stop reason: HOSPADM

## 2025-04-07 RX ORDER — OXYCODONE HCL 5 MG/5 ML
10 SOLUTION, ORAL ORAL
Status: DISCONTINUED | OUTPATIENT
Start: 2025-04-07 | End: 2025-04-07 | Stop reason: HOSPADM

## 2025-04-07 RX ORDER — DEXAMETHASONE SODIUM PHOSPHATE 4 MG/ML
INJECTION, SOLUTION INTRA-ARTICULAR; INTRALESIONAL; INTRAMUSCULAR; INTRAVENOUS; SOFT TISSUE PRN
Status: DISCONTINUED | OUTPATIENT
Start: 2025-04-07 | End: 2025-04-07 | Stop reason: SURG

## 2025-04-07 RX ORDER — MORPHINE SULFATE 4 MG/ML
2 INJECTION INTRAVENOUS ONCE
Status: COMPLETED | OUTPATIENT
Start: 2025-04-07 | End: 2025-04-07

## 2025-04-07 RX ORDER — LABETALOL HYDROCHLORIDE 5 MG/ML
10 INJECTION, SOLUTION INTRAVENOUS
Status: DISCONTINUED | OUTPATIENT
Start: 2025-04-07 | End: 2025-04-10 | Stop reason: HOSPADM

## 2025-04-07 RX ORDER — HYDROMORPHONE HYDROCHLORIDE 1 MG/ML
0.1 INJECTION, SOLUTION INTRAMUSCULAR; INTRAVENOUS; SUBCUTANEOUS
Status: DISCONTINUED | OUTPATIENT
Start: 2025-04-07 | End: 2025-04-07 | Stop reason: HOSPADM

## 2025-04-07 RX ORDER — OMEPRAZOLE 20 MG/1
20 CAPSULE, DELAYED RELEASE ORAL DAILY
Status: DISCONTINUED | OUTPATIENT
Start: 2025-04-08 | End: 2025-04-07

## 2025-04-07 RX ADMIN — TRANEXAMIC ACID 1000 MG: 100 INJECTION, SOLUTION INTRAVENOUS at 07:20

## 2025-04-07 RX ADMIN — DEXAMETHASONE SODIUM PHOSPHATE 4 MG: 4 INJECTION INTRA-ARTICULAR; INTRALESIONAL; INTRAMUSCULAR; INTRAVENOUS; SOFT TISSUE at 23:54

## 2025-04-07 RX ADMIN — HYDROMORPHONE HYDROCHLORIDE 0.4 MG: 2 INJECTION INTRAMUSCULAR; INTRAVENOUS; SUBCUTANEOUS at 09:22

## 2025-04-07 RX ADMIN — DEXAMETHASONE SODIUM PHOSPHATE 4 MG: 4 INJECTION INTRA-ARTICULAR; INTRALESIONAL; INTRAMUSCULAR; INTRAVENOUS; SOFT TISSUE at 14:24

## 2025-04-07 RX ADMIN — PROPOFOL 200 MG: 10 INJECTION, EMULSION INTRAVENOUS at 07:16

## 2025-04-07 RX ADMIN — SODIUM CHLORIDE, POTASSIUM CHLORIDE, SODIUM LACTATE AND CALCIUM CHLORIDE: 600; 310; 30; 20 INJECTION, SOLUTION INTRAVENOUS at 06:22

## 2025-04-07 RX ADMIN — FENTANYL CITRATE 50 MCG: 50 INJECTION, SOLUTION INTRAMUSCULAR; INTRAVENOUS at 08:33

## 2025-04-07 RX ADMIN — LIDOCAINE HYDROCHLORIDE 100 MG: 20 INJECTION, SOLUTION EPIDURAL; INFILTRATION; INTRACAUDAL; PERINEURAL at 07:16

## 2025-04-07 RX ADMIN — ACETAMINOPHEN 1 G: 1000 INJECTION, SOLUTION INTRAVENOUS at 06:45

## 2025-04-07 RX ADMIN — DOCUSATE SODIUM 100 MG: 50 LIQUID ORAL at 17:44

## 2025-04-07 RX ADMIN — FENTANYL CITRATE 50 MCG: 50 INJECTION, SOLUTION INTRAMUSCULAR; INTRAVENOUS at 07:15

## 2025-04-07 RX ADMIN — POTASSIUM CHLORIDE AND SODIUM CHLORIDE: 900; 150 INJECTION, SOLUTION INTRAVENOUS at 14:22

## 2025-04-07 RX ADMIN — PROPOFOL 80 MCG/KG/MIN: 10 INJECTION, EMULSION INTRAVENOUS at 07:29

## 2025-04-07 RX ADMIN — Medication: at 14:27

## 2025-04-07 RX ADMIN — PHENYLEPHRINE HYDROCHLORIDE 100 MCG: 10 INJECTION INTRAVENOUS at 07:18

## 2025-04-07 RX ADMIN — CEFAZOLIN 2 G: 2 INJECTION, POWDER, FOR SOLUTION INTRAMUSCULAR; INTRAVENOUS at 14:54

## 2025-04-07 RX ADMIN — CEFAZOLIN 2 G: 1 INJECTION, POWDER, FOR SOLUTION INTRAMUSCULAR; INTRAVENOUS at 07:20

## 2025-04-07 RX ADMIN — CEFAZOLIN 2 G: 2 INJECTION, POWDER, FOR SOLUTION INTRAMUSCULAR; INTRAVENOUS at 22:09

## 2025-04-07 RX ADMIN — PHENYLEPHRINE HYDROCHLORIDE 100 MCG: 10 INJECTION INTRAVENOUS at 07:22

## 2025-04-07 RX ADMIN — SUCCINYLCHOLINE CHLORIDE 100 MG: 20 INJECTION, SOLUTION INTRAMUSCULAR; INTRAVENOUS at 07:16

## 2025-04-07 RX ADMIN — HYDROMORPHONE HYDROCHLORIDE 0.6 MG: 2 INJECTION INTRAMUSCULAR; INTRAVENOUS; SUBCUTANEOUS at 07:39

## 2025-04-07 RX ADMIN — ONDANSETRON 4 MG: 2 INJECTION INTRAMUSCULAR; INTRAVENOUS at 09:01

## 2025-04-07 RX ADMIN — MORPHINE SULFATE 2 MG: 4 INJECTION INTRAVENOUS at 14:22

## 2025-04-07 RX ADMIN — DEXAMETHASONE SODIUM PHOSPHATE 4 MG: 4 INJECTION INTRA-ARTICULAR; INTRALESIONAL; INTRAMUSCULAR; INTRAVENOUS; SOFT TISSUE at 21:15

## 2025-04-07 RX ADMIN — Medication 1 APPLICATOR: at 21:15

## 2025-04-07 RX ADMIN — Medication: at 10:01

## 2025-04-07 RX ADMIN — FENTANYL CITRATE 50 MCG: 50 INJECTION, SOLUTION INTRAMUSCULAR; INTRAVENOUS at 07:28

## 2025-04-07 RX ADMIN — DEXAMETHASONE SODIUM PHOSPHATE 8 MG: 4 INJECTION INTRA-ARTICULAR; INTRALESIONAL; INTRAMUSCULAR; INTRAVENOUS; SOFT TISSUE at 07:16

## 2025-04-07 RX ADMIN — Medication 25 MG: at 07:36

## 2025-04-07 RX ADMIN — PHENYLEPHRINE HYDROCHLORIDE 30 MCG/MIN: 10 INJECTION INTRAVENOUS at 07:29

## 2025-04-07 RX ADMIN — ACETAMINOPHEN 1000 MG: 500 TABLET, FILM COATED ORAL at 17:45

## 2025-04-07 SDOH — ECONOMIC STABILITY: TRANSPORTATION INSECURITY
IN THE PAST 12 MONTHS, HAS LACK OF RELIABLE TRANSPORTATION KEPT YOU FROM MEDICAL APPOINTMENTS, MEETINGS, WORK OR FROM GETTING THINGS NEEDED FOR DAILY LIVING?: NO

## 2025-04-07 SDOH — ECONOMIC STABILITY: TRANSPORTATION INSECURITY
IN THE PAST 12 MONTHS, HAS THE LACK OF TRANSPORTATION KEPT YOU FROM MEDICAL APPOINTMENTS OR FROM GETTING MEDICATIONS?: NO

## 2025-04-07 ASSESSMENT — SOCIAL DETERMINANTS OF HEALTH (SDOH)
WITHIN THE LAST YEAR, HAVE YOU BEEN HUMILIATED OR EMOTIONALLY ABUSED IN OTHER WAYS BY YOUR PARTNER OR EX-PARTNER?: NO
WITHIN THE LAST YEAR, HAVE YOU BEEN AFRAID OF YOUR PARTNER OR EX-PARTNER?: NO
IN THE PAST 12 MONTHS, HAS THE ELECTRIC, GAS, OIL, OR WATER COMPANY THREATENED TO SHUT OFF SERVICE IN YOUR HOME?: NO
WITHIN THE LAST YEAR, HAVE YOU BEEN KICKED, HIT, SLAPPED, OR OTHERWISE PHYSICALLY HURT BY YOUR PARTNER OR EX-PARTNER?: NO
WITHIN THE LAST YEAR, HAVE TO BEEN RAPED OR FORCED TO HAVE ANY KIND OF SEXUAL ACTIVITY BY YOUR PARTNER OR EX-PARTNER?: NO
WITHIN THE PAST 12 MONTHS, YOU WORRIED THAT YOUR FOOD WOULD RUN OUT BEFORE YOU GOT THE MONEY TO BUY MORE: NEVER TRUE
WITHIN THE PAST 12 MONTHS, THE FOOD YOU BOUGHT JUST DIDN'T LAST AND YOU DIDN'T HAVE MONEY TO GET MORE: NEVER TRUE

## 2025-04-07 ASSESSMENT — PAIN DESCRIPTION - PAIN TYPE
TYPE: ACUTE PAIN;SURGICAL PAIN
TYPE: ACUTE PAIN;SURGICAL PAIN
TYPE: SURGICAL PAIN
TYPE: ACUTE PAIN;SURGICAL PAIN

## 2025-04-07 ASSESSMENT — LIFESTYLE VARIABLES
AVERAGE NUMBER OF DAYS PER WEEK YOU HAVE A DRINK CONTAINING ALCOHOL: 0
EVER HAD A DRINK FIRST THING IN THE MORNING TO STEADY YOUR NERVES TO GET RID OF A HANGOVER: NO
TOTAL SCORE: 0
EVER FELT BAD OR GUILTY ABOUT YOUR DRINKING: NO
CONSUMPTION TOTAL: NEGATIVE
DOES PATIENT WANT TO STOP DRINKING: NO
ON A TYPICAL DAY WHEN YOU DRINK ALCOHOL HOW MANY DRINKS DO YOU HAVE: 0
HAVE PEOPLE ANNOYED YOU BY CRITICIZING YOUR DRINKING: NO
HOW MANY TIMES IN THE PAST YEAR HAVE YOU HAD 5 OR MORE DRINKS IN A DAY: 0
HAVE YOU EVER FELT YOU SHOULD CUT DOWN ON YOUR DRINKING: NO
TOTAL SCORE: 0
TOTAL SCORE: 0
ALCOHOL_USE: NO

## 2025-04-07 NOTE — ANESTHESIA PREPROCEDURE EVALUATION
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            Relevant Problems   ANESTHESIA (within normal limits)  Vocal cord paralysis      PULMONARY (within normal limits)      CARDIAC  S/p ablation.  Pacemaker dependent.   (positive) Atrial fibrillation (HCC)      GI  Dysphagia s/p G-tube placement      ENDO  Pre-DM   Neurofibromatosis    Physical Exam    Airway   Mallampati: II  TM distance: >3 FB  Neck ROM: full       Cardiovascular - normal exam  Rhythm: regular  Rate: normal  (-) murmur     Dental - normal exam           Pulmonary - normal exam  Breath sounds clear to auscultation     Abdominal    Neurological - normal exam                   Anesthesia Plan    ASA 3       Plan - general       Airway plan will be ETT          Induction: intravenous    Postoperative Plan: Postoperative administration of opioids is intended.    Pertinent diagnostic labs and testing reviewed    Informed Consent:    Anesthetic plan and risks discussed with patient.    Use of blood products discussed with: patient whom consented to blood products.

## 2025-04-07 NOTE — PROGRESS NOTES
Medication history reviewed with PT at [facility] is complete per PT reporting    Allergies reviewed.     Patient denies any outpatient antibiotics in the last 30 days.     Patient is taking Xarelto 20mg. Last dose 03/31/2025.    Dispense history is available.    Preferred pharmacy for this visit - [facility]  ([phone])

## 2025-04-07 NOTE — OP REPORT
1. DATE OF SURGERY: 4/7/2025    2. SURGEON:  Fabi Castano MD, PhD, SURYA, Neurosurgeon    3. ASSISTANT:  Mango Denton PA-C     An assistant was crucial to have during the case as the assistant helped with positioning, keeping the field clear of blood and retraction. This case could not be done easily without a qualified assistant. It was medically necessary    4. TYPE OF ANESTHESIA:  General anesthesia with endotracheal intubation    5. PREOPERATIVE DIAGNOSIS: T11-12    1.  Multiple schwannomatosis.  2.  Status post previous resection of brainstem schwannoma with significant   neurological deficits.    3.  Status post resection of intradural extramedullary   T7 lesion.  4.  Status post previous resection of T7 tumor, C3-4 2 [id number] tumor in 2013  5.  Multiple lumbar tumors.   6.  New left frontal lesion  7.  Expanding cystic large right T11-12 tumor compressing the cord    6. POSTOPERATIVE DIAGNOSIS:      1.  Multiple schwannomatosis.  2.  Status post previous resection of brainstem schwannoma with significant   neurological deficits.    3.  Status post resection of intradural extramedullary   T7 lesion.  4.  Status post previous resection of T7 tumor, C3-4 2 [id number] tumor in 2013  5.  Multiple lumbar tumors.   6.  New left frontal lesion  7.  Expanding cystic large right T11-12 tumor compressing the cord      7. HISTORY: See formal admission H and P    7/20/2021  Dr. Castano and I were able to review Chevy Angeles today in a neurosurgical consultation.  He has known history of neurofibromatosis syndrome.  He has had multiple intradural resections of tumor.  He has also had removal of schwannoma in the brain.  He has had serial MRI scans and is here for further review after his most updated MRI scans of the neural access.     At today's visit he is not reporting any significant changes to his condition.  He does report episodes of pain in the right and left calf.  These are usually short-lived.  He also has  episodes in which he feels like his right leg will give out.  He has not fallen because of this.  Again these are short-lived well.  He did have important history of diagnoses of chronic myelogenous leukemia thousand 19.     2/24/2025  Chevy returns.  Symptomatically doing okay.  He still has some swallowing difficulties.  He uses a G-tube and gets he is about half of his calories from oral intake.  He gets intermittent right leg numbness.  Since we last saw him he does have a pacemaker in place.  He sees a cardiologist in [place].  He is on Eliquis.  Uses a walking stick.  Pain is not an issue.  He is generally feeling weak but no specific symptoms.  I reviewed all his imaging.  He has a new left frontal lesion.  He has a large thoracic lesion.  Of suggested resection of the thoracic 1 as it is compressing the cord.  I will get him an appoint with Dr. Reyes for the brain tumor.  I counseled him we should get the surgery done within the next 3 months or so.  He will need rehab afterwards as he lives alone.  He will need a holiday from her Eliquis perioperatively.    8. PREOPERATIVE PHYSICAL EXAMINATION:          Imaging Result(s):   Updated MRI scan of the brain, cervical thoracic, lumbar spines from [facility] from May 2021 were reviewed and compared imaging from November 2018.  The brain, cervical and lumbar spine is all stable.  The lumbar spine shows multiple enhancing lesion consistent with schwannomas.  The thoracic spine demonstrated a lesion at the T11-T12 region again trauma.  The report indicates that this may have grown by 1-2 mm circumferentially.  There is no gross change in the size between the 2 studies but there may be very subtle changes.        2/24/2025  He had updated imaging of his neuraxis.  This is loaded on PACS.  This was on 10/21/2025.  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  There is mention of a left final 1 which is new.  Significantly has  a right cystic T11-12 lesion compressing the spinal cord with rim enhancement.  This is opposite the T11 to space.      9. TITLE OF PROCEDURE:  T11-12 decompressive laminectomy  and resection of intradural extra medullary tumor causing severe spinal cord compression   Modifier 22-there was a second tumor which was inferior to the right.  This was also resected.  This had another 45 minutes to the case and 50% more definitive time  Microscopic magnification for microdissection  i/o On-Q Pain catheters in paraspinal musculature    10. OPERATIVE FINDINGS: There was a large 1.5 x 1.5 cystic tumor on the right side extradural lesion at T11-12.  This was arising off rootlets on the right side of the cord.  There was an additional 1 x 1 cm that I found inferiorly again arising off the right side of the cord of the rootlets.  Macroscopic resection was affected.  These appeared to be schwannomas.    11. OPERATED LEVELS: T11-12    12. IMPLANTS USED: Nil    13. COMPLICATIONS:  Nil.    14. ESTIMATED BLOOD LOSS:    50 mL      15. OPERATIVE DETAILS:  After fully informed consent, the patient was brought to the operating room.  General anesthesia was administered.  The patient was given intravenous antibiotic.  The patient was then turned prone onto the OSI operating table  and OSI frame.  All pressure points were padded.  Initial fluoroscopic localization was taken for skin marking.  The posterior lumbar region was prepped and draped in a standard fashion.      Using the initial x-ray as a guide, a midline skin incision was then affected.  This involved the spinous processes at the affected levels where the laminectomies were to be done.  This was at the T11-12 level.  A bilateral subperiosteal exposure was affected.  Great care was taken not to violate the facet joints.      Once the exposure had been done, self-retaining retractors were placed.  Hemostasis was obtained.   Laminectomy was affected. The laminectomy was affected  with Leksell rongeurs initially, then I used an AM-8 drill bit under microscopic magnification and illumination to thin down the lamina.  This was almost all of T11 on the left T12.  There was scar tissue inferiorly but there been previous surgery.    Once the lamina was paper thin, I used combination of 2 mm Kerrison punches to remove the remaining bone and ligamentum flavum.  I worked from a cranial to a caudal direction.     Intraoperative ultrasound was then brought in.  I could see as above and below the lesion I could see the lesion compressing the cord.  Interestingly I saw 1 tumor I did not see the second treatment till is on the microscope      The microscope was brought in the field of view.  I opened the dura in a midline fashion.  I managed to get the arachnoid intact.  I used 4-0 Nurolon sutures to tack up the dura.    Under the microscope using microsurgical technique I then entered the arachnoid above the lesion and continued dissection in a caudal fashion.  I resected the tumor in a piecemeal fashion.  Microscopic resection was affected.  I used the ultrasonic aspirator to debulk the lesion.  I then use microdissection.  There were nerve roots attached to it and I managed to tease this off.  There was no fluids going into the tumor which I divided.  The tumor was removed piecemeal fashion.  There was free flow of CSF from both directions.  There was no bleeding.       As I explored intradurally I found a second tumor on the right side inferiorly.  This was 1 x 1 cm.  I then performed microsurgical dissection around this chain but this appeared to be a schwannoma.  The rootlets going into the chamber and out of the chamber.  These are diathermy and divided.  Gross macroscopic resection of both lesions was affected.    The dura was reapproximated during using 6-0 Oxford-Laron.  I placed dural adhesive over this. 2 paraspinal On-Q pain catheters were placed.     I then closed the wall wound over a suction  subfascial Hemovac connected to Oj-Tijerina bulb using multiple Vicryl sutures.  This was done in layers.  The skin was closed with 2-Overtical mattress nylon.    Neuro monitoring was stable and improved by the end of the case.     All counts were correct at the end of the case and all instrumentation was accounted for.  The patient was then carefully turned supine again onto a regular operating table without incident.    At the end of case, the patient was moving both lower extremities well.    16 PROGNOSIS: The surgery went well.  Neuromonitoring was stable.  Will keep him on bedrest for 24 hours.      Fabi Castano MD, PhD, SURYA, Neurosurgeon        Fabi Castano MD, PhD, SURYA  Neurosurgeon  ? [phone]  Fax: [phone] [address] [address]  [email or web address]  [email or web address]          Cc:   Ammon Garay D.O.  No ref. provider found  Catarina Prather

## 2025-04-07 NOTE — PROGRESS NOTES
Pt arrived via hospital bed with male transporter. Aox4 on 2L NC.     Currently on 24hr bed rest until 4/8 at 0945    4 Eyes Skin Assessment Completed by Adelita, RN and YODIT Mortensen.    Head WDL  Ears WDL  Nose WDL  Mouth WDL  Neck WDL  Breast/Chest WDL  Shoulder Blades WDL  Spine Incision, surgical dressings in place   (R) Arm/Elbow/Hand Redness and Blanching  (L) Arm/Elbow/Hand Redness and Blanching  Abdomen WDL  Groin WDL, rashid catheter in place   Scrotum/Coccyx/Buttocks Redness, slow to phil  (R) Leg WDL  (L) Leg WDL  (R) Heel/Foot/Toe Redness, Blanching, and Edema, pitting edema  (L) Heel/Foot/Toe Redness, Blanching, and Edema. Pitting edema          Devices In Places Blood Pressure Cuff, Pulse Ox, Rashid, SCD's, and Nasal Cannula      Interventions In Place Gray Ear Foams, NC W/Ear Foams, Waffle Overlay, TAP System, and Pressure Redistribution Mattress    Possible Skin Injury Yes    Pictures Uploaded Into Epic Yes  Wound Consult Placed Yes  RN Wound Prevention Protocol Ordered Yes

## 2025-04-07 NOTE — ANESTHESIA PROCEDURE NOTES
Airway    Date/Time: 4/7/2025 7:17 AM    Performed by: Vivi Quinones M.D.  Authorized by: Vivi Quinones M.D.    Location:  OR  Urgency:  Elective  Indications for Airway Management:  Anesthesia      Spontaneous Ventilation: absent    Sedation Level:  Deep  Preoxygenated: Yes    Patient Position:  Sniffing  Final Airway Type:  Endotracheal airway  Final Endotracheal Airway:  ETT  Cuffed: Yes    Technique Used for Successful ETT Placement:  Direct laryngoscopy    Insertion Site:  Oral  Blade Type:  Glide  Laryngoscope Blade/Videolaryngoscope Blade Size:  3  ETT Size (mm):  7.5  Measured from:  Teeth  ETT to Teeth (cm):  23  Placement Verified by: auscultation and capnometry    Cormack-Lehane Classification:  Grade I - full view of glottis  Number of Attempts at Approach:  1

## 2025-04-07 NOTE — PROGRESS NOTES
0941: Pt arrived from OR post surgery under anesthesia. Pt is asleep. Site dressing is CDI with OnQ pump and ROBER drain. Cardiac rhythm appears to be SR.     1108: Report to YODIT Ureña.     1130: Pt to floor via bed with transport.

## 2025-04-07 NOTE — DIETARY
"Nutrition Services: Initial Assessment     Day 0 of admit. 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    Consult Received for: EN    Current Hospital Problems List:    Principal Problem:    Intradural extramedullary spinal tumor (POA: Unknown)  Active Problems:    Thoracic intradural extramedullary tumor (POA: Yes)  Resolved Problems:    * No resolved hospital problems. *       Nutrition Assessment:      Height: 198.1 cm (6' 6\")  Weight: 91.5 kg (201 lb 11.5 oz)  Weight taken via: Stand Up Scale  BMI Calculated: 23.31  BMI Classification: WNL       Weight Readings from Last 5 encounters:   Wt Readings from Last 5 Encounters:   04/07/25 91.5 kg (201 lb 11.5 oz)   03/28/25 93 kg (205 lb)   02/25/25 93 kg (205 lb)   09/22/18 93.7 kg (206 lb 9.1 oz)   03/10/16 85.2 kg (187 lb 13.3 oz)       Calculation Equation:   Total Calories / day:  3334-7922 kcal (25-30 kcal/kg)  Total Grams Protein / day: 110-137 g (1.2-1.5 g/kg)      Objective:   Pertinent Medical Hx: Neurofibromatosis syndrome, chronic myelogenous leukemia. Presented for planned surgery 4/7; underwent T11-12 decompressive laminectomy and resection of intradural extra medullary tumor causing severe spinal cord compression   Indication for Nutrition Support: Unable to meet nutrition needs orally  Enteral Access:   PEG  Pertinent Labs: A1c 5.9 (3/28/25)  Pertinent Meds: Decadron, colace, prilosec, senna-docusate, 0.9% NaCl with Kcl @ 100ml/hr  Skin/Wounds:  Surgical incision on back  Food Allergies: NKFA  Last BM:     04/05/25   Formula based on RD Eval: Jevity 1.5 Sarath      Current Diet Order/Intake:   Regular diet  Noted pt takes ~50% of calories via PEG    Subjective:   Telehealth visit - spoke with pt on the phone. Pt reports he usually eats 3 meals/day and takes 2 Boost VHC. States he used to take 3 Boost VHC/day, but noticed he was gaining weight " and was concerned with his A1c, so decreased to 2 Boost VHC/day and is now at a preferred weight. Reports he does not want to eat orally while in the hospital as he is concerned that it will be too painful and would prefer to receive 100% of nutrition via PEG during admission. Per discussion with pt, RD will order Jevity 1.5 @ 7 cartons/day.     Pt expressed concern about bed rest and needing to be upright for tube feeding. RD reached out to NIGEL Denton on preference to start tube feeding today or wait until after bed rest ends.    Nutrition Focused Physical Exam (NFPE)  Weight Loss: No weight loss at this time  Muscle Mass: Unable to identify at this time; RD working remotely  Subcutaneous Fat: Unable to identify at this time; RD working remotely  Fluid Accumulation: 1+ BLE edema  Reduced  Strength: N/A in acute care setting.    Nutrition Diagnosis:      Swallowing Difficulty related to dysphagia as evidenced by need for tube feeding to meet nutrition needs.      Based on RD assessment at this time, Patient does not meet criteria in congruence with ASPEN/Academy guidelines for malnutrition    Nutrition Interventions:      Initiate Jevity 1.5 @ 7 cartons per day. Recommend to give 2 cartons TID at meal times and 1 carton at HS.  Goal tube feed volume provides 2485 kcals, 106 g protein, and 1260 ml free water daily.   Patient aware of active plan of care as appropriate.       Nutrition Monitoring and Evaluation:      Monitor nutrition POC, goal for nutrition support tolerated and meeting >85% of estimated needs  Additional fluids per MD/DO  Monitor vital signs pertinent to nutrition.    RD following and will provide updated recommendations as indicated.      Isatu Coreas R.D.                                         ASPEN/AND CRITERIA FOR MALNUTRITION

## 2025-04-07 NOTE — PROGRESS NOTES
Virtual Nurse rounding and admission profile complete.    Round Needs: Pain Rating 7/10 and Notified of Patient Needs: RN

## 2025-04-07 NOTE — ANESTHESIA TIME REPORT
Anesthesia Start and Stop Event Times       Date Time Event    4/7/2025 0647 Ready for Procedure     0709 Anesthesia Start     0944 Anesthesia Stop          Responsible Staff  04/07/25      Name Role Begin End    Vivi Quinones M.D. Anesth 0709 0944          Overtime Reason:  no overtime (within assigned shift)    Comments:

## 2025-04-07 NOTE — PROGRESS NOTES
UPDATE TO HISTORY AND PHYSICAL    I met with patient and any family members in preop. Again discussed planned surgery. I went over the risks, benefits and alternatives of the surgery in the office visit. I had discussed any off label use of products. The patient had been given literature to read about the procedure in person and via email if able and had access to the office note via the patient portal. I Answered any questions remaining from prior visits.     There was no change to my last H and P (it may be labelled a progress note or a H and P in EPIC).   The note was made by either myself, or my PAs Pranay Pelletier or Mango Denton but is signed by me, if it was done by my physician assistant.   I verify it is correct and has my co-signature.    Fabi Castano MD PhD SURYA

## 2025-04-07 NOTE — DISCHARGE PLANNING
RN GERALDO met with patient at bedside to complete assessment.   Pleasantly A&Ox4 and able to verify information on face sheet.   Lives alone in single story home in [facility].   Reports independence with ADLs and IADLs at baseline.   Owns vehicle and drives at baseline.   He is retired and denies financial instability.   Denies mental health or substance misuse concerns.   Owns SCP which he uses at baseline.   Does not use supplemental oxygen at baseline.   Patient has a G-Tube due to dysphagia which he receives approximately 50% of his caloric intake from. Reports G-Tube supplies and formula is provided by Karissa TORO ([facility]).   PCP is Ammon Garay DO through [employer].   Insured by Medicare A/B and Supplemental Roy.     -Pending Neurosurgery clearance and PT/OT evaluations after strict bed-rest is discontinued. Patient to remain on bed-rest until tomorrow, 4/8/2025, at 1200.     -Patient is hopeful to discharge home.   [place] [facility] referrals sent per protocol.   [place] PASRR: [id number]   Inadequate discharge support would likely disqualify him from Acute Inpatient Rehab.     Case Management Discharge Planning    Admission Date: 4/7/2025  GMLOS:    ALOS: 0    6-Clicks ADL Score:    6-Clicks Mobility Score:    PT and/or OT Eval ordered: Yes  Post-acute Referrals Ordered: Yes  Post-acute Choice Obtained: No  Has referral(s) been sent to post-acute provider:  Yes    Anticipated Discharge Dispo: Discharge Disposition: D/T to SNF with Medicare cert in anticipation of skilled care (03)    DME Needed: Unknown.    Action(s) Taken: Updated Provider/Nurse on Discharge Plan, Patient Conference, and DC Assessment Complete (See below)    Escalations Completed: None    Medically Clear: No    Next Steps: Discuss post-acute needs with IDT after bed-rest order is lifted.     Barriers to Discharge: Medical clearance and Pending PT Evaluation    Is the patient up for discharge tomorrow: Unknown.     Care Transition  Team Assessment    Information Source  Orientation Level: Oriented X4  Information Given By: Patient  Informant's Name: Chevy  Who is responsible for making decisions for patient? : Patient    Readmission Evaluation  Is this a readmission?: No    Interdisciplinary Discharge Planning  Does Admitting Nurse Feel This Could be a Complex Discharge?: No  Primary Care Physician: Ammon Garay DO with [facility]  Lives with - Patient's Self Care Capacity: Alone and Able to Care For Self  Patient or legal guardian wants to designate a caregiver: No  Support Systems: Friends / Neighbors  Housing / Facility: [address]    Discharge Preparedness  What is your plan after discharge?: Uncertain - pending medical team collaboration  What are your discharge supports?: Sibling, Other (comment) (Friend)  Prior Functional Level: Ambulatory, Drives Self, Independent with Activities of Daily Living, Independent with Medication Management, Uses Cane  Difficulity with ADLs: None  Difficulity with IADLs: None    Functional Assesment  Prior Functional Level: Ambulatory, Drives Self, Independent with Activities of Daily Living, Independent with Medication Management, Uses Cane    Finances  Financial Barriers to Discharge: No  Prescription Coverage: Yes    Vision / Hearing Impairment  Vision Impairment : Yes  Right Eye Vision: Impaired, Wears Glasses  Left Eye Vision: Wears Glasses, Impaired    Values / Beliefs / Concerns  Values / Beliefs Concerns : No    Advance Directive  Advance Directive?: None  Advance Directive offered?: AD Booklet refused    Domestic Abuse  Physical Abuse or Sexual Abuse: No  Verbal Abuse or Emotional Abuse: No  Possible Abuse/Neglect Reported to:: Not Applicable    Psychological Assessment  History of Substance Abuse: None  History of Psychiatric Problems: No  Non-compliant with Treatment: No  Newly Diagnosed Illness: No    Discharge Risks or Barriers  Discharge risks or barriers?: Complex medical  needs, Lives alone, no community support  Patient risk factors: Complex medical needs, Cognitive / sensory / physical deficit, Lives alone and no community support    Anticipated Discharge Information  Discharge Disposition: D/T to SNF with Medicare cert in anticipation of skilled care (03)

## 2025-04-07 NOTE — OR SURGEON
Immediate Post OP Note    5. PREOPERATIVE DIAGNOSIS: T11-12     1.  Multiple schwannomatosis.  2.  Status post previous resection of brainstem schwannoma with significant   neurological deficits.    3.  Status post resection of intradural extramedullary   T7 lesion.  4.  Status post previous resection of T7 tumor, C3-4 2 [id number] tumor in 2013  5.  Multiple lumbar tumors.   6.  New left frontal lesion  7.  Expanding cystic large right T11-12 tumor compressing the cord     6. POSTOPERATIVE DIAGNOSIS:       1.  Multiple schwannomatosis.  2.  Status post previous resection of brainstem schwannoma with significant   neurological deficits.    3.  Status post resection of intradural extramedullary   T7 lesion.  4.  Status post previous resection of T7 tumor, C3-4 2 [id number] tumor in 2013  5.  Multiple lumbar tumors.   6.  New left frontal lesion  7.  Expanding cystic large right T11-12 tumor compressing the cord      Procedure(s):  T11-12 decompressive laminectomy and resection of intradural tumor - Wound Class: Clean with Drain    Surgeon(s):  Fabi Castano M.D.    Anesthesiologist/Type of Anesthesia:  Anesthesiologist: Vivi Quinones M.D./General    Surgical Staff:  Assistant: Mango Denton P.A.-C.  Circulator: Sandi Holden R.N.  Scrub Person: Kesha Dalton Morton: Mary Torres R.N.  Radiology Technologist: Leonila Turcios    Specimens removed if any:  ID Type Source Tests Collected by Time Destination   A : arachnoid calcification Tissue Back PATHOLOGY SPECIMEN Fabi Castano M.D. 4/7/2025  8:25 AM    B : intradural extramedullary tumor #1 Tissue Back PATHOLOGY SPECIMEN Fabi Castano M.D. 4/7/2025  8:40 AM    C : intradural extramedullary tumor #2 Tissue Back PATHOLOGY SPECIMEN Fabi Castano M.D. 4/7/2025  8:43 AM        Assistants: Mango Denton PA-C  ,  Specimen: tumors x2 with arachnoid calcfications    Estimated Blood Loss: 50 cc    Findings: n/a    Complications:  nil        4/7/2025 9:56 AM Fabi Castano M.D.

## 2025-04-07 NOTE — PROGRESS NOTES
Postop check.  Full strength in both lower extremities.  Minimal wound pain.  Minimal headache.  Incision is dry.  He looks good.  His drain will stay until midnight.  Will ambulate him tomorrow and hopefully home tomorrow afternoon.  He does not need anything to reduce the aspiration risk overnight.  Will go home on Lovenox.  He will restart his Xarelto 10 days postoperatively, next Thursday.

## 2025-04-07 NOTE — ANESTHESIA POSTPROCEDURE EVALUATION
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 Responsible Provider: Vivi Quinones M.D.    Anesthesia Type: general ASA Status: 3            Final Anesthesia Type: general  Last vitals  BP   Blood Pressure : 103/58    Temp   36.8 °C (98.2 °F)    Pulse   64   Resp   16    SpO2   91 %      Anesthesia Post Evaluation    Patient location during evaluation: PACU  Patient participation: complete - patient participated  Level of consciousness: awake and alert    Airway patency: patent  Anesthetic complications: no  Cardiovascular status: hemodynamically stable  Respiratory status: acceptable  Hydration status: euvolemic    PONV: none          No notable events documented.     Nurse Pain Score: 1 (NPRS)

## 2025-04-07 NOTE — LETTER
March 3, 2025    Patient Name: Chevy Angeles  Surgeon Name: Fabi Castano M.D.  Surgery Facility: [facility] ([address] [facility])  Surgery Date: 4/7/2025    The time of your surgery is not final and may change up to and until the day of your surgery. You will be contacted 1-2 business days prior to your surgery date with your check-in and surgery time.    BEFORE YOUR SURGERY - WITH THE FACILITY  Pre Registration and/or Lab Work/Tests must be done within 60 days of your surgery date. These will be done at [facility], with an appointment. This appointment should be completed before your pre op appointment in our office.    On 03/31 - if you have not already heard from [facility] Pre Admission/Registration Department, please call them at [phone] option 2, then option 1, for an appointment.      BEFORE YOUR SURGERY - WITH YOUR SURGEONS OFFICE  Pre op Appointment:   Date: 03/28/25   Time: 10:00AM   Provider: Mango Denton PA-C    Location: [address] [place], [place] [address]    Bring a list of all medications you are currently taking including the dosing and frequency.    Please read the MEDICATION INSTRUCTIONS below completely.    DAY OF YOUR SURGERY  Nothing to eat or drink and refrain from smoking any substance after midnight the day of your surgery. Smoking may interfere with the anesthetic and frequently produces nausea during the recovery period.    Continue taking all lifesaving medications, including the morning of your surgery with small sip of water.    You will need a responsible adult to drive you to and from your surgery.    AFTER YOUR SURGERY  2 Week Post op Appointment:   Date: 04/25/25   Time: 10:00AM  With: Mango Denton PA-C   Location: [facility] [place]    6 Week Post op Appointment:   Date: 05/23/25   Time: 10:00AM   With: Mango Denton PA-C   Location: [facility] [place]    MEDICATION INSTRUCTIONS  Do not take these medications prior to your procedure:             Anti-inflammatories: stop 7 days prior, restart when advised. For fusions avoid for 12 weeks after surgery            Naproxen (Naprosyn or Alleve)            Motrin            Ibuprofen            Nabumetone (Relafen)            Meloxicam (Mobic)            Celebrex            Salsalate            Diclofenac (Arthrotec, Voltaren, Flector)            Sulindac (Clinoril            Etodolac (Lodine)            Indomethacin (Indocin)            Ketoprofen            Ketorolac            Oxaprozin (Daypro)            Piroxicam (Feldene)            Blood thinners (stop after approval from the prescribing physician)            Aspirin (any dosage): Stop 14 days prior, restart 7 days after procedure            Any medications that contain aspirin in combination (ie: Excedrin migraine, Fiorinal, and Norgesic)            Warfarin (Coumadin): Stop 14 days prior, INR day of procedure, restart 2-3 weeks after procedure            Antiplatelet: Stop 14 days prior, restart 7 days after procedure            Ticlid (ticlopidine): Stop 14 days prior            Plavix (clopidogrel): Stop 7 days prior            Aggrenox or Dipyridamole: Stop 14 days prior            ReoPro (abciximab): Stop 14 days prior            Integrilin (eptifibatide): Stop 14 days prior            Aggrastat (tirofiban): Stop 14 days prior            Lovenox (Enoxaparin): Stop 24hrs before and restart 24hrs after procedure            Heparin: Stop 24hrs before             Dalteparin (Fragmin): Stop 24hrs before            Fondaparinux (Arixtra): Stop 24hrs before            Xeralto, Dabigatran (Pradaxa) Stop 5 days prior            Eliquis (apibaxan) Stop 7 days prior    Okay to take these medications as prescribed:            Muscle relaxers            Acetaminophen and pain medications that have it in addition to oxycodone and hydrocodone            Blood pressure, cholesterol and diabetes medications are ok      TIME OFF WORK  FMLA or Disability forms can be  faxed directly to [phone] or you may drop them off at any [facility]. Our office charges a $35.00 fee. Forms will be completed within 5-10 business days after payment is received. For the status of your forms you may contact our disability office directly at [phone].    DENTAL PROCDURES/CLEANINGS Avoid 3 weeks before surgery and for 3 months after surgery.    QUESTIONS ABOUT COSTS  Contact our Patient Financial Services department at [phone] for more information.    If you have any questions, please contact our office.    Kesha   Surgery Scheduler  [email or web address]  ? [phone]   Fax: [phone]  EXT [id number]  [place] [address]  [phone]

## 2025-04-07 NOTE — CARE PLAN
The patient is Stable - Low risk of patient condition declining or worsening    Shift Goals  Clinical Goals: pain control, PCA pain assessment, bedrest  Patient Goals: pain control, comfort  Family Goals: not present    Progress made toward(s) clinical / shift goals:    Problem: Knowledge Deficit - Standard  Goal: Patient and family/care givers will demonstrate understanding of plan of care, disease process/condition, diagnostic tests and medications  Outcome: Progressing     Plan of care discussed with patient at beside. Personal belongings and call light with patient in bed.     Patient on bedrest until 4/8 0945 at this time due to dural surgical intervention at this time. Oneil in place, orders in place to remove on POD1 after bedrest is complete. PCA pump initiated for patient use, Q2h pain assessment in place to monitor for over-medication admin.    Problem: Pain - Standard  Goal: Alleviation of pain or a reduction in pain to the patient’s comfort goal  Outcome: Progressing     Problem: Skin Integrity  Goal: Skin integrity is maintained or improved  Outcome: Progressing       Patient is not progressing towards the following goals:

## 2025-04-08 ENCOUNTER — APPOINTMENT (OUTPATIENT)
Dept: RADIOLOGY | Facility: MEDICAL CENTER | Age: 70
DRG: 029 | End: 2025-04-08
Attending: PHYSICIAN ASSISTANT
Payer: MEDICARE

## 2025-04-08 PROBLEM — R13.10 DYSPHAGIA: Status: ACTIVE | Noted: 2025-04-08

## 2025-04-08 PROBLEM — I95.1 ORTHOSTATIC HYPOTENSION: Status: ACTIVE | Noted: 2025-04-08

## 2025-04-08 PROBLEM — G93.40 ENCEPHALOPATHY: Status: ACTIVE | Noted: 2025-04-08

## 2025-04-08 PROBLEM — R00.1 JUNCTIONAL BRADYCARDIA: Status: ACTIVE | Noted: 2025-04-08

## 2025-04-08 LAB
ABO + RH BLD: NORMAL
ANION GAP SERPL CALC-SCNC: 11 MMOL/L (ref 7–16)
APTT PPP: 24.9 SEC (ref 24.7–36)
BUN SERPL-MCNC: 17 MG/DL (ref 8–22)
CALCIUM SERPL-MCNC: 9 MG/DL (ref 8.5–10.5)
CHLORIDE SERPL-SCNC: 102 MMOL/L (ref 96–112)
CO2 SERPL-SCNC: 23 MMOL/L (ref 20–33)
CREAT SERPL-MCNC: 0.8 MG/DL (ref 0.5–1.4)
CRP SERPL HS-MCNC: 0.37 MG/DL (ref 0–0.75)
ERYTHROCYTE [DISTWIDTH] IN BLOOD BY AUTOMATED COUNT: 47.2 FL (ref 35.9–50)
GFR SERPLBLD CREATININE-BSD FMLA CKD-EPI: 96 ML/MIN/1.73 M 2
GLUCOSE BLD STRIP.AUTO-MCNC: 150 MG/DL (ref 65–99)
GLUCOSE BLD STRIP.AUTO-MCNC: 80 MG/DL (ref 65–99)
GLUCOSE SERPL-MCNC: 138 MG/DL (ref 65–99)
HCT VFR BLD AUTO: 46.2 % (ref 42–52)
HGB BLD-MCNC: 15.8 G/DL (ref 14–18)
INR PPP: 1.19 (ref 0.87–1.13)
MCH RBC QN AUTO: 34.3 PG (ref 27–33)
MCHC RBC AUTO-ENTMCNC: 34.2 G/DL (ref 32.3–36.5)
MCV RBC AUTO: 100.2 FL (ref 81.4–97.8)
PLATELET # BLD AUTO: 192 K/UL (ref 164–446)
PMV BLD AUTO: 10.8 FL (ref 9–12.9)
POTASSIUM SERPL-SCNC: 5.1 MMOL/L (ref 3.6–5.5)
PREALB SERPL-MCNC: 20 MG/DL (ref 18–38)
PROTHROMBIN TIME: 15.1 SEC (ref 12–14.6)
RBC # BLD AUTO: 4.61 M/UL (ref 4.7–6.1)
SODIUM SERPL-SCNC: 136 MMOL/L (ref 135–145)
WBC # BLD AUTO: 11.3 K/UL (ref 4.8–10.8)

## 2025-04-08 PROCEDURE — 86900 BLOOD TYPING SEROLOGIC ABO: CPT

## 2025-04-08 PROCEDURE — 97535 SELF CARE MNGMENT TRAINING: CPT

## 2025-04-08 PROCEDURE — 86901 BLOOD TYPING SEROLOGIC RH(D): CPT

## 2025-04-08 PROCEDURE — 84134 ASSAY OF PREALBUMIN: CPT

## 2025-04-08 PROCEDURE — 99024 POSTOP FOLLOW-UP VISIT: CPT | Performed by: PHYSICIAN ASSISTANT

## 2025-04-08 PROCEDURE — 770020 HCHG ROOM/CARE - TELE (206)

## 2025-04-08 PROCEDURE — 700101 HCHG RX REV CODE 250: Performed by: PHYSICIAN ASSISTANT

## 2025-04-08 PROCEDURE — RXMED WILLOW AMBULATORY MEDICATION CHARGE: Performed by: PHYSICIAN ASSISTANT

## 2025-04-08 PROCEDURE — 700111 HCHG RX REV CODE 636 W/ 250 OVERRIDE (IP): Mod: JZ | Performed by: STUDENT IN AN ORGANIZED HEALTH CARE EDUCATION/TRAINING PROGRAM

## 2025-04-08 PROCEDURE — 82962 GLUCOSE BLOOD TEST: CPT

## 2025-04-08 PROCEDURE — 93005 ELECTROCARDIOGRAM TRACING: CPT | Mod: TC | Performed by: PHYSICIAN ASSISTANT

## 2025-04-08 PROCEDURE — 86140 C-REACTIVE PROTEIN: CPT

## 2025-04-08 PROCEDURE — 97166 OT EVAL MOD COMPLEX 45 MIN: CPT

## 2025-04-08 PROCEDURE — 700111 HCHG RX REV CODE 636 W/ 250 OVERRIDE (IP): Performed by: HOSPITALIST

## 2025-04-08 PROCEDURE — 700102 HCHG RX REV CODE 250 W/ 637 OVERRIDE(OP): Performed by: NEUROLOGICAL SURGERY

## 2025-04-08 PROCEDURE — 97162 PT EVAL MOD COMPLEX 30 MIN: CPT

## 2025-04-08 PROCEDURE — 99223 1ST HOSP IP/OBS HIGH 75: CPT | Performed by: HOSPITALIST

## 2025-04-08 PROCEDURE — 700102 HCHG RX REV CODE 250 W/ 637 OVERRIDE(OP): Performed by: PHYSICIAN ASSISTANT

## 2025-04-08 PROCEDURE — 85027 COMPLETE CBC AUTOMATED: CPT

## 2025-04-08 PROCEDURE — 700105 HCHG RX REV CODE 258: Performed by: HOSPITALIST

## 2025-04-08 PROCEDURE — 70450 CT HEAD/BRAIN W/O DYE: CPT

## 2025-04-08 PROCEDURE — 700101 HCHG RX REV CODE 250

## 2025-04-08 PROCEDURE — 36415 COLL VENOUS BLD VENIPUNCTURE: CPT

## 2025-04-08 PROCEDURE — A9270 NON-COVERED ITEM OR SERVICE: HCPCS | Performed by: NEUROLOGICAL SURGERY

## 2025-04-08 PROCEDURE — 85610 PROTHROMBIN TIME: CPT

## 2025-04-08 PROCEDURE — 51798 US URINE CAPACITY MEASURE: CPT

## 2025-04-08 PROCEDURE — 80048 BASIC METABOLIC PNL TOTAL CA: CPT

## 2025-04-08 PROCEDURE — A9270 NON-COVERED ITEM OR SERVICE: HCPCS | Performed by: PHYSICIAN ASSISTANT

## 2025-04-08 PROCEDURE — 97163 PT EVAL HIGH COMPLEX 45 MIN: CPT

## 2025-04-08 PROCEDURE — 700111 HCHG RX REV CODE 636 W/ 250 OVERRIDE (IP): Mod: JZ | Performed by: PHYSICIAN ASSISTANT

## 2025-04-08 PROCEDURE — 85730 THROMBOPLASTIN TIME PARTIAL: CPT

## 2025-04-08 RX ORDER — ENOXAPARIN SODIUM 100 MG/ML
40 INJECTION SUBCUTANEOUS DAILY
Qty: 10 EACH | Refills: 0 | Status: ACTIVE | OUTPATIENT
Start: 2025-04-09 | End: 2025-04-20

## 2025-04-08 RX ORDER — OXYCODONE HYDROCHLORIDE 5 MG/1
2.5 TABLET ORAL EVERY 4 HOURS PRN
Refills: 0 | Status: DISCONTINUED | OUTPATIENT
Start: 2025-04-08 | End: 2025-04-10 | Stop reason: HOSPADM

## 2025-04-08 RX ORDER — NALOXONE HYDROCHLORIDE 0.4 MG/ML
0.4 INJECTION, SOLUTION INTRAMUSCULAR; INTRAVENOUS; SUBCUTANEOUS ONCE
Status: COMPLETED | OUTPATIENT
Start: 2025-04-08 | End: 2025-04-08

## 2025-04-08 RX ORDER — CEPHALEXIN 500 MG/1
500 CAPSULE ORAL 4 TIMES DAILY
Status: DISCONTINUED | OUTPATIENT
Start: 2025-04-08 | End: 2025-04-10 | Stop reason: HOSPADM

## 2025-04-08 RX ORDER — DEXTROSE MONOHYDRATE 25 G/50ML
INJECTION, SOLUTION INTRAVENOUS
Status: COMPLETED
Start: 2025-04-08 | End: 2025-04-08

## 2025-04-08 RX ORDER — OXYCODONE HYDROCHLORIDE 5 MG/1
5 TABLET ORAL EVERY 4 HOURS PRN
Refills: 0 | Status: DISCONTINUED | OUTPATIENT
Start: 2025-04-08 | End: 2025-04-08

## 2025-04-08 RX ORDER — ACETAMINOPHEN 500 MG
1000 TABLET ORAL EVERY 6 HOURS
Qty: 120 TABLET | Refills: 0 | Status: SHIPPED | OUTPATIENT
Start: 2025-04-08

## 2025-04-08 RX ORDER — DEXTROSE MONOHYDRATE 25 G/50ML
50 INJECTION, SOLUTION INTRAVENOUS ONCE
Status: COMPLETED | OUTPATIENT
Start: 2025-04-08 | End: 2025-04-08

## 2025-04-08 RX ORDER — CEPHALEXIN 500 MG/1
500 CAPSULE ORAL 4 TIMES DAILY
Status: DISCONTINUED | OUTPATIENT
Start: 2025-04-08 | End: 2025-04-08

## 2025-04-08 RX ORDER — OXYCODONE HYDROCHLORIDE 5 MG/1
2.5 TABLET ORAL EVERY 4 HOURS PRN
Refills: 0 | Status: DISCONTINUED | OUTPATIENT
Start: 2025-04-08 | End: 2025-04-08

## 2025-04-08 RX ORDER — OXYCODONE HYDROCHLORIDE 5 MG/1
5 TABLET ORAL EVERY 6 HOURS PRN
Qty: 28 TABLET | Refills: 0 | Status: SHIPPED | OUTPATIENT
Start: 2025-04-08 | End: 2025-04-17

## 2025-04-08 RX ORDER — SODIUM CHLORIDE 9 MG/ML
INJECTION, SOLUTION INTRAVENOUS CONTINUOUS
Status: DISCONTINUED | OUTPATIENT
Start: 2025-04-08 | End: 2025-04-10 | Stop reason: HOSPADM

## 2025-04-08 RX ORDER — CEPHALEXIN 500 MG/1
500 CAPSULE ORAL 4 TIMES DAILY
Qty: 20 CAPSULE | Refills: 0 | Status: ACTIVE | OUTPATIENT
Start: 2025-04-08 | End: 2025-04-15

## 2025-04-08 RX ORDER — PREGABALIN 50 MG/1
50 CAPSULE ORAL 2 TIMES DAILY
Qty: 60 CAPSULE | Refills: 1 | Status: SHIPPED | OUTPATIENT
Start: 2025-04-08 | End: 2025-05-10

## 2025-04-08 RX ORDER — PREGABALIN 25 MG/1
50 CAPSULE ORAL 2 TIMES DAILY
Status: DISCONTINUED | OUTPATIENT
Start: 2025-04-08 | End: 2025-04-09

## 2025-04-08 RX ORDER — OXYCODONE HYDROCHLORIDE 5 MG/1
5 TABLET ORAL EVERY 4 HOURS PRN
Refills: 0 | Status: DISCONTINUED | OUTPATIENT
Start: 2025-04-08 | End: 2025-04-10 | Stop reason: HOSPADM

## 2025-04-08 RX ORDER — ENOXAPARIN SODIUM 100 MG/ML
40 INJECTION SUBCUTANEOUS DAILY
Status: DISCONTINUED | OUTPATIENT
Start: 2025-04-08 | End: 2025-04-10 | Stop reason: HOSPADM

## 2025-04-08 RX ADMIN — SODIUM CHLORIDE: 9 INJECTION, SOLUTION INTRAVENOUS at 16:31

## 2025-04-08 RX ADMIN — PREGABALIN 50 MG: 25 CAPSULE ORAL at 09:04

## 2025-04-08 RX ADMIN — ACETAMINOPHEN 1000 MG: 500 TABLET, FILM COATED ORAL at 18:07

## 2025-04-08 RX ADMIN — CEPHALEXIN 500 MG: 500 CAPSULE ORAL at 21:04

## 2025-04-08 RX ADMIN — POTASSIUM CHLORIDE AND SODIUM CHLORIDE: 900; 150 INJECTION, SOLUTION INTRAVENOUS at 02:08

## 2025-04-08 RX ADMIN — CEPHALEXIN 500 MG: 500 CAPSULE ORAL at 18:09

## 2025-04-08 RX ADMIN — FAMOTIDINE 20 MG: 10 INJECTION, SOLUTION INTRAVENOUS at 00:40

## 2025-04-08 RX ADMIN — ENOXAPARIN SODIUM 40 MG: 100 INJECTION SUBCUTANEOUS at 09:03

## 2025-04-08 RX ADMIN — Medication 1 APPLICATOR: at 18:08

## 2025-04-08 RX ADMIN — DOCUSATE SODIUM 100 MG: 50 LIQUID ORAL at 18:07

## 2025-04-08 RX ADMIN — DEXTROSE MONOHYDRATE 50 ML: 25 INJECTION, SOLUTION INTRAVENOUS at 13:58

## 2025-04-08 RX ADMIN — SENNOSIDES AND DOCUSATE SODIUM 1 TABLET: 50; 8.6 TABLET ORAL at 21:04

## 2025-04-08 RX ADMIN — NALOXONE HYDROCHLORIDE 0.4 MG: 0.4 INJECTION, SOLUTION INTRAMUSCULAR; INTRAVENOUS; SUBCUTANEOUS at 13:59

## 2025-04-08 RX ADMIN — Medication 1 APPLICATOR: at 06:10

## 2025-04-08 RX ADMIN — PREGABALIN 50 MG: 25 CAPSULE ORAL at 18:08

## 2025-04-08 RX ADMIN — CEPHALEXIN 500 MG: 500 CAPSULE ORAL at 09:04

## 2025-04-08 ASSESSMENT — COGNITIVE AND FUNCTIONAL STATUS - GENERAL
WALKING IN HOSPITAL ROOM: A LITTLE
TURNING FROM BACK TO SIDE WHILE IN FLAT BAD: A LITTLE
SUGGESTED CMS G CODE MODIFIER MOBILITY: CK
PERSONAL GROOMING: A LITTLE
DRESSING REGULAR LOWER BODY CLOTHING: A LOT
TOILETING: A LOT
MOBILITY SCORE: 18
MOVING TO AND FROM BED TO CHAIR: A LITTLE
DAILY ACTIVITIY SCORE: 16
MOVING FROM LYING ON BACK TO SITTING ON SIDE OF FLAT BED: A LITTLE
HELP NEEDED FOR BATHING: A LOT
SUGGESTED CMS G CODE MODIFIER DAILY ACTIVITY: CK
CLIMB 3 TO 5 STEPS WITH RAILING: A LITTLE
STANDING UP FROM CHAIR USING ARMS: A LITTLE
DRESSING REGULAR UPPER BODY CLOTHING: A LITTLE

## 2025-04-08 ASSESSMENT — GAIT ASSESSMENTS
GAIT LEVEL OF ASSIST: CONTACT GUARD ASSIST
ASSISTIVE DEVICE: 4 WHEEL WALKER
DISTANCE (FEET): 160
DEVIATION: DECREASED BASE OF SUPPORT

## 2025-04-08 ASSESSMENT — ACTIVITIES OF DAILY LIVING (ADL): TOILETING: INDEPENDENT

## 2025-04-08 ASSESSMENT — PAIN DESCRIPTION - PAIN TYPE
TYPE: ACUTE PAIN;SURGICAL PAIN
TYPE: ACUTE PAIN

## 2025-04-08 NOTE — DISCHARGE SUMMARY
Discharge Summary    CHIEF COMPLAINT ON ADMISSION  No chief complaint on file.      Reason for Admission  Intradural extramedullary spinal t*     Admission Date  4/7/2025    CODE STATUS  Full Code    HPI & HOSPITAL COURSE  2/24/2025  Chevy returns.  Symptomatically doing okay.  He still has some swallowing difficulties.  He uses a G-tube and gets he is about half of his calories from oral intake.  He gets intermittent right leg numbness.  Since we last saw him he does have a pacemaker in place.  He sees a cardiologist in [place].  He is on Eliquis.  Uses a walking stick.  Pain is not an issue.  He is generally feeling weak but no specific symptoms.  I reviewed all his imaging.  He has a new left frontal lesion.  He has a large thoracic lesion.  Of suggested resection of the thoracic 1 as it is compressing the cord.  I will get him an appoint with Dr. Reyes for the brain tumor.  I counseled him we should get the surgery done within the next 3 months or so.  He will need rehab afterwards as he lives alone.  He will need a holiday from her Eliquis perioperatively.     He had updated imaging of his neuraxis.  This is loaded on PACS.  This was on 10/21/2025.  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  There is mention of a left final 1 which is new.  Significantly has a right cystic T11-12 lesion compressing the spinal cord with rim enhancement.  This is opposite the T11 to space.    4/8/2025  On postoperative day #1 the patient is doing quite well.  He reports improvement of his preoperative leg symptoms.  He has developed some increased flank pain, Lyrica has been initiated.  He has baseline dysphagia with a G-tube placed.  There continues to be some incisional site discomfort which is well controlled with oral analgesics at this time.  The patient is eating and drinking without complication.  They are mobilizing well.  They do not report any nausea, vomiting, fever or headache.  They  have remained hemodynamically stable.  Based upon the above information the patient will be discharged to home in a stable condition.           No notes on file    Therefore, he is discharged in good and stable condition to home with close outpatient follow-up.    The patient recovered much more quickly than anticipated on admission.    Discharge Date  4/8/2025     FOLLOW UP ITEMS POST DISCHARGE  Follow up with our office in 2, 6, and 12 weeks.  Report to ER with any complications.  Call our office with any questions or concerns.  No anti-inflammatories for 2 weeks, Lovenox shots in the belly for 10 days, then he can restart his xarelto.   Clear to shower Thursday, pat incision dry, no special creams or ointments.   Avoid bending, lifting and twisting.   Walk 4-6 times per day as tolerated to help prevent blood clots.     DISCHARGE DIAGNOSES  Principal Problem:    Intradural extramedullary spinal tumor (POA: Unknown)  Active Problems:    Thoracic intradural extramedullary tumor (POA: Yes)  Resolved Problems:    * No resolved hospital problems. *      FOLLOW UP  Future Appointments   Date Time Provider Department Center   4/25/2025 10:00 AM IRENE Gallagher [facility]   5/23/2025 10:00 AM IRENE Gallagher [facility]     No follow-up provider specified.    MEDICATIONS ON DISCHARGE     Medication List        START taking these medications        Instructions   acetaminophen 500 MG Tabs  Commonly known as: Tylenol   2 Tablets by Enteral Tube route every 6 hours.  Dose: 1,000 mg     cephALEXin 500 MG Caps  Commonly known as: Keflex   1 Capsule by Enteral Tube route 4 times a day for 5 days. G tube  Dose: 500 mg     enoxaparin 40 MG/0.4ML Sosy inj  Start taking on: April 9, 2025  Commonly known as: Lovenox   Inject 40 mg under the skin every day for 10 days. Resume xarelto after completing Lovenox  Dose: 40 mg     oxyCODONE immediate-release 5 MG Tabs  Commonly known as: Roxicodone   Take 1  Tablet by mouth every 6 hours as needed for Severe Pain for up to 7 days.  Dose: 5 mg     pregabalin 50 MG capsule  Commonly known as: Lyrica   Take 1 Capsule by mouth 2 times a day for 30 days.  Dose: 50 mg     tizanidine 4 MG Tabs  Commonly known as: Zanaflex   1 Tablet by Enteral Tube route 3 times a day as needed (muscle spasm).  Dose: 4 mg            CONTINUE taking these medications        Instructions   Boost Very High Calorie Liqd   2 Bottles by Per G Tube route 3 times a day.  Dose: 2 Bottle     lansoprazole 30 MG Cpdr  Commonly known as: Prevacid   Take 30 mg by mouth every day.  Dose: 30 mg     loratadine 10 MG Tabs  Commonly known as: Claritin   Take 10 mg by mouth 1 time a day as needed (allergies).  Dose: 10 mg     metoprolol SR 25 MG Tb24  Commonly known as: Toprol XL   Take 25 mg by mouth every day.  Dose: 25 mg            STOP taking these medications      Xarelto 20 MG Tabs tablet  Generic drug: rivaroxaban              Allergies  No Known Allergies    DIET  Orders Placed This Encounter   Procedures    Diet Order Diet: Regular     Standing Status:   Standing     Number of Occurrences:   1     Diet::   Regular [1]    Diet: Diet Tube Feed; Formula: Bolus Only; Select Bolus (If Indicated): Jevity 1.5 Carton; Bolus Frequency: QID; Number of Cartons Per Day: Seven     Jevity 1.5 bolus feedings. Recommend 2 cartons each TID at meal times and 1 carton at HS for a total of 7 cartons/day.     Standing Status:   Standing     Number of Occurrences:   1     Diet:   Diet Tube Feed [35]     Formula::   Bolus Only     Route:   Gtube/PEG     Select Bolus (If Indicated)::   Jevity 1.5 Carton     Bolus Frequency:   QID     Number of Cartons Per Day::   Seven       ACTIVITY  As tolerated.  Weight bearing as tolerated    CONSULTATIONS  None    PROCEDURES  TITLE OF PROCEDURE:  T11-12 decompressive laminectomy  and resection of intradural extra medullary tumor causing severe spinal cord compression   Modifier 22-there  was a second tumor which was inferior to the right.  This was also resected.  This had another 45 minutes to the case and 50% more definitive time  Microscopic magnification for microdissection  i/o On-Q Pain catheters in paraspinal musculature    LABORATORY  Lab Results   Component Value Date    SODIUM 136 04/08/2025    POTASSIUM 5.1 04/08/2025    CHLORIDE 102 04/08/2025    CO2 23 04/08/2025    GLUCOSE 138 (H) 04/08/2025    BUN 17 04/08/2025    CREATININE 0.80 04/08/2025    GLOMRATE 73 11/06/2023        Lab Results   Component Value Date    WBC 11.3 (H) 04/08/2025    HEMOGLOBIN 15.8 04/08/2025    HEMATOCRIT 46.2 04/08/2025    PLATELETCT 192 04/08/2025        Total time of the discharge process exceeds 30 minutes.

## 2025-04-08 NOTE — THERAPY
"Physical Therapy   Initial Evaluation     Patient Name: Chevy Angeles  Age:  69 y.o., Sex:  male  Medical Record #: [id number]  Today's Date: 4/8/2025     Precautions  Precautions: Fall Risk;PEG Tube;Spinal / Back Precautions   Comments: G tube; no brace ordered    Assessment    69 y.o. male was admitted for T11- T12 laminectomy with tumor resection.  .  PMHx includes pacemaker, a fib, dysphagia with g-tube, preDM, and neurofibromatosis.  He lives alone in a 1SH with a ramp and was mod I for mobility with a SPC.  On eval, he presents with orthopedic restrictions, weakness, and  decreased activity tolerance impairing his mobility.  He ambulated 160' CGA with a FWW initially demonstrating shuffling steps, then progressing to partial step thru gait pattern.  He required 4 standing rest breaks with reports of lightheadedness and feeling like his pacemaker was not responding appropriately to his activity level.  He will benefit from continued acute PT services to address the above deficits in order to return home safely.  Recommend post acute PT services.    Plan    Physical Therapy Initial Treatment Plan   Treatment Plan : (P) Bed Mobility, Equipment, Family / Caregiver Training, Gait Training, Manual Therapy, Neuro Re-Education / Balance, Self Care / Home Evaluation, Therapeutic Activities, Therapeutic Exercise  Treatment Frequency: (P) 5 Times per Week  Duration: (P) Until Therapy Goals Met    DC Equipment Recommendations: (P) Unable to determine at this time  Discharge Recommendations: (P) Recommend post-acute placement for additional physical therapy services prior to discharge home       Subjective    \"I need my pacemaker to step up.  Sometimes I'm working hard, but it won't.\"     Objective       04/08/25 1239   Initial Contact Note    Initial Contact Note Order Received and Verified, Physical Therapy Evaluation in Progress with Full Report to Follow.   Vitals   Pulse 71   Room Air Oximetry 94   O2 Delivery " Device None - Room Air   Pain 0 - 10 Group   Therapist Pain Assessment Post Activity Pain Same as Prior to Activity  (no complaints of pain)   Prior Living Situation   Housing / Facility 1 Story House   Steps Into Home   (ramp)   Bathroom Set up Walk In Shower   Equipment Owned Single Point Cane;Bed Side Commode   Lives with - Patient's Self Care Capacity Alone and Able to Care For Self   Prior Level of Functional Mobility   Bed Mobility Independent   Transfer Status Independent   Ambulation Independent   Ambulation Distance community   Assistive Devices Used Single Point Cane   History of Falls   History of Falls No   Date of Last Fall   (Pt reports no falls x1 year)   Cognition    Cognition / Consciousness WDL   Level of Consciousness Alert   Active ROM Upper Body   Comments see OT eval, WFL for mobility   Strength Upper Body   Comments see OT eval, WFL for mobility   Active ROM Lower Body    Active ROM Lower Body  WDL   Strength Lower Body   Lower Body Strength  X   Comments BLEs grossly 4/5   Sensation Lower Body   Lower Extremity Sensation   X   Comments numbness lateral R foot and ankle   Coordination Lower Body    Coordination Lower Body  WDL   Balance Assessment   Sitting Balance (Static) Fair +   Sitting Balance (Dynamic) Fair   Standing Balance (Static) Fair -   Standing Balance (Dynamic) Fair -   Weight Shift Sitting Fair   Weight Shift Standing Fair   Comments with FWW   Bed Mobility    Comments Pt up in chair when PT entered.  Pt educated with demonstration on log roll.   Gait Analysis   Gait Level Of Assist Contact Guard Assist   Assistive Device 4 Wheel Walker   Distance (Feet) 160   # of Times Distance was Traveled 1  (with 4 standing rest breaks, tapping his pacemaker, reporting mild lightheadedness)   Deviation Decreased Base Of Support  (shuffled gait -> 3/4 foot B step length /p 20', fair B foot clearance)   Weight Bearing Status FWB BLEs   Functional Mobility   Sit to Stand Contact Guard Assist    Bed, Chair, Wheelchair Transfer Contact Guard Assist   Transfer Method Stand Step   Mobility with FWW   Activity Tolerance   Sitting in Chair pre and post session   Edema / Skin Assessment   Edema / Skin  Not Assessed   Short Term Goals    Short Term Goal # 1 Pt will perform supine < > sit SPV with bed flat, no rail in 6 visits in order to set up for upright mobility   Short Term Goal # 2 Pt will perform sit < > stand SPV in 6 visits in order to prepare for ambulation   Short Term Goal # 3 Pt will ambulate 150' SPV /c FWW in 6 visits in order to return home safely   Education Group   Education Provided Role of Physical Therapist   Role of Physical Therapist Patient Response Patient;Acceptance;Explanation;Action Demonstration   Additional Comments bed mobility- Patient;Acceptance;Explanation;Action Demonstration;Reinforcement Needed;   Physical Therapy Initial Treatment Plan    Treatment Plan  Bed Mobility;Equipment;Family / Caregiver Training;Gait Training;Manual Therapy;Neuro Re-Education / Balance;Self Care / Home Evaluation;Therapeutic Activities;Therapeutic Exercise   Treatment Frequency 5 Times per Week   Duration Until Therapy Goals Met   Problem List    Problems Impaired Bed Mobility;Impaired Transfers;Impaired Ambulation;Functional Strength Deficit;Decreased Activity Tolerance   Anticipated Discharge Equipment and Recommendations   DC Equipment Recommendations Unable to determine at this time   Discharge Recommendations Recommend post-acute placement for additional physical therapy services prior to discharge home   Interdisciplinary Plan of Care Collaboration   IDT Collaboration with  Nursing;Occupational Therapist   Patient Position at End of Therapy Seated;Chair Alarm On;Call Light within Reach;Tray Table within Reach   Collaboration Comments RN updated, hand off from OT   Session Information   Date / Session Number  4/8 -1 (1/5, 4/14)

## 2025-04-08 NOTE — PROGRESS NOTES
1327:   Patient found sitting in chair. Bedside RN attempted to speak to patient, patient not responding and frequently dozing off in chair. BP 66/46, HR 73, 91% on RA.    Staff assist called for assistance getting patient back to bed and RRT called. Pt stood and pivoted to bed from chair, placed in reverse trendelenburg position and nonrebreather mask placed. RRT at bedside.     Patient vitals improved (99/66 BP, 68HR), however remained Aox0.    Corrie MANNING called and notified of patient change of condition, hospital medicine consulted and CT head ordered. Dr. Gonzalez at bedside.     FSBG 80     Narcan and D50 given.    Pt transported to CT with RRT

## 2025-04-08 NOTE — PROGRESS NOTES
ROBER drain take out as per order. No output. Aseptic technique maintained, pt. Tolerated the procedure well. Surgical site covered with sterile gauze and tegaderm.

## 2025-04-08 NOTE — CARE PLAN
Problem: Knowledge Deficit - Standard  Goal: Patient and family/care givers will demonstrate understanding of plan of care, disease process/condition, diagnostic tests and medications  Description: Target End Date:  1-3 days or as soon as patient condition allowsDocument in Patient Education1.  Patient and family/caregiver oriented to unit, equipment, visitation policy and means for communicating concern2.  Complete/review Learning Assessment3.  Assess knowledge level of disease process/condition, treatment plan, diagnostic tests and medications4.  Explain disease process/condition, treatment plan, diagnostic tests and medications  Outcome: Met     Problem: Knowledge Deficit - Standard  Goal: Patient and family/care givers will demonstrate understanding of plan of care, disease process/condition, diagnostic tests and medications  Description: Target End Date:  1-3 days or as soon as patient condition allowsDocument in Patient Education1.  Patient and family/caregiver oriented to unit, equipment, visitation policy and means for communicating concern2.  Complete/review Learning Assessment3.  Assess knowledge level of disease process/condition, treatment plan, diagnostic tests and medications4.  Explain disease process/condition, treatment plan, diagnostic tests and medications  Outcome: Met     Problem: Pain - Standard  Goal: Alleviation of pain or a reduction in pain to the patient’s comfort goal  Description: Target End Date:  Prior to discharge or change in level of careDocument on Vitals flowsheet1.  Document pain using the appropriate pain scale per order or unit policy2.  Educate and implement non-pharmacologic comfort measures (i.e. relaxation, distraction, massage, cold/heat therapy, etc.)3.  Pain management medications as ordered4.  Reassess pain after pain med administration per policy5.  If opiods administered assess patient's response to pain medication is appropriate per POSS sedation scale6.  Follow  pain management plan developed in collaboration with patient and interdisciplinary team (including palliative care or pain specialists if applicable)  Outcome: Met     Problem: Pain - Standard  Goal: Alleviation of pain or a reduction in pain to the patient’s comfort goal  Description: Target End Date:  Prior to discharge or change in level of careDocument on Vitals flowsheet1.  Document pain using the appropriate pain scale per order or unit policy2.  Educate and implement non-pharmacologic comfort measures (i.e. relaxation, distraction, massage, cold/heat therapy, etc.)3.  Pain management medications as ordered4.  Reassess pain after pain med administration per policy5.  If opiods administered assess patient's response to pain medication is appropriate per POSS sedation scale6.  Follow pain management plan developed in collaboration with patient and interdisciplinary team (including palliative care or pain specialists if applicable)  Outcome: Met     Problem: Skin Integrity  Goal: Skin integrity is maintained or improved  Description: Target End Date:  Prior to discharge or change in level of careDocument interventions on Skin Risk/Bijan flowsheet groups and corresponding LDA1.  Assess and monitor skin integrity, appearance and/or temperature2.  Assess risk factors for impaired skin integrity and/or pressures ulcers3.  Implement precautions to protect skin integrity in collaboration with interdisciplinary team4.  Implement pressure ulcer prevention protocol if at risk for skin breakdown5.  Confirm wound care consult if at risk for skin breakdown6.  Ensure patient use of pressure relieving devices  (Low air loss bed, waffle overlay, heel protectors, ROHO cushion, etc)  Outcome: Met     Problem: Skin Integrity  Goal: Skin integrity is maintained or improved  Description: Target End Date:  Prior to discharge or change in level of careDocument interventions on Skin Risk/Bijan flowsheet groups and corresponding LDA1.   Assess and monitor skin integrity, appearance and/or temperature2.  Assess risk factors for impaired skin integrity and/or pressures ulcers3.  Implement precautions to protect skin integrity in collaboration with interdisciplinary team4.  Implement pressure ulcer prevention protocol if at risk for skin breakdown5.  Confirm wound care consult if at risk for skin breakdown6.  Ensure patient use of pressure relieving devices  (Low air loss bed, waffle overlay, heel protectors, ROHO cushion, etc)  Outcome: Met     Problem: Fall Risk  Goal: Patient will remain free from falls  Description: Target End Date:  Prior to discharge or change in level of careDocument interventions on the [facility] Fall Risk Assessment1.  Assess for fall risk factors2.  Implement fall precautions  Outcome: Met     Problem: Fall Risk  Goal: Patient will remain free from falls  Description: Target End Date:  Prior to discharge or change in level of careDocument interventions on the [facility] Fall Risk Assessment1.  Assess for fall risk factors2.  Implement fall precautions  Outcome: Met   The patient is Stable - Low risk of patient condition declining or worsening    Shift Goals  Clinical Goals: Pain control, bed rest for 24H, monitor vitals, rashid output, ROBER drain, and neuro chec  Patient Goals: Pain control, rest, comfort  Family Goals: N/A    Progress made toward(s) clinical / shift goals:      Pt. Vital signs WDL.   Pt. Has no new skin integrity issue/ impairment.   Pt. Verbalized understanding on the care provided.   Pt. Rated pain between 0 and 4 from 1-10, 10 being the most painful. Pain medications given as ordered.   Pt. Didn't fall under RN care. Fall precautions in place.

## 2025-04-08 NOTE — CODE DOCUMENTATION
Rapid Response Summary     Rapid response called at 1330 for: Altered mental status     VS: Hypotensive (See Vitals Flowsheet)  Additional info: pt hypotensive SBP 60 in chair, back to bed blood pressure 115-120, blood sugar 80, small right sided droop, expressive and receptive aphasia present   MD Paged: NIGEL Denton notified 1330, and Dr. Gonzalez from hospitalist consulted   Interventions:    Imaging/Tests: CT   Labs: Blood Glucose    Medications: Narcan  and Dextrose   Other: PIV started   Disposition: Improved with rapid response team interventions. Primary RN updated on plan of care. Transfer not indicated at this time.

## 2025-04-08 NOTE — CARE PLAN
The patient is Stable - Low risk of patient condition declining or worsening    Shift Goals  Clinical Goals: pain control, mobility, DC PCA. DC rashid  Patient Goals: pain control, rest  Family Goals: not present    Progress made toward(s) clinical / shift goals:    Problem: Nutrition  Goal: Patient's nutritional and fluid intake will be adequate or improve  Outcome: Progressing  Goal: Enteral nutrition will be maintained or improve  Outcome: Progressing  Goal: Enteral nutrition will be maintained or improve  Outcome: Progressing     Problem: Mobility  Goal: Patient's capacity to carry out activities will improve  Outcome: Progressing     Problem: Self Care  Goal: Patient will have the ability to perform ADLs independently or with assistance (bathe, groom, dress, toilet and feed)  Outcome: Progressing     Problem: Infection - Standard  Goal: Patient will remain free from infection  Outcome: Progressing     Problem: Wound/ / Incision Healing  Goal: Patient's wound/surgical incision will decrease in size and heals properly  Outcome: Progressing     Plan of care discussed with patient at beside. Personal belongings and call light with patient in bed. DC rashid and PCA this AM. Pt to work with PT/OT this AM, patient off bedrest at this time. Patient also needs to tolerate pain medication.    Patient is not progressing towards the following goals:

## 2025-04-08 NOTE — DISCHARGE PLANNING
1330: PT/OT both recommending post-acute placement for dizziness and balance concerns. Multiple [place] SNFs have accepted. Patient still require qualifying hospital stay (3 midnights ending 4/10/2025).     -He is not agreeable to post-acute placement at this time and hopes to improve prior to 4/10/2025. Patient aware he will need to choose SNF vs. Home on 4/10/2025 (unless IDT clears for home prior to that date), or else he will need to appeal at that time. Provider notified and agreeable watch patient overnight.

## 2025-04-08 NOTE — PROGRESS NOTES
2115 4/7/25: Pt. Refused to take pills orally, as per patient it will be difficult for him to take pills orally while lying flat, patient preferred G/PEG tube route of medication administration/ RN educated that when administering medication through PEG, patient has to be upright but pt. Has  to remain on strict bedrest for 24H. Pt. verbalized understanding and refused oral medications.

## 2025-04-08 NOTE — CONSULTS
Hospital Medicine Consult Note    Date of Service  4/8/2025    Primary Care Physician  Ammon Garay D.O.    Consult requested by   Mango Denton PA-C for Dr. Castano    Chief Complaint / Reason for consult   No chief complaint on file.  Confusion and hypotension    History of Presenting Illness  69 y.o. male who presented previously for neurosurgical procedure.  He was taken by Dr. Castano for decompressive laminectomy and resection of intradural tumor from T11-12 on 4/7.  The following day, patient was doing well and thus was sat up in the chair.  He was then found to have hypotension with BP 60/40.  He was then placed back in bed with improvement of blood pressure.  However, he became confused.  He was not given anything to eat either orally or via the G-tube today.  Earlier in the day, he was AOx4.  After the episode he became AAO x 1.  He did not have any focal weakness but with his confusion he also had difficulty understanding.  The case was discussed with neurosurgery as well as neurology who did not feel that this was consistent with a large vessel stroke.  Thus, patient was transferred to telemetry and CT was ordered by neurosurgery.    I discussed the plan of care with patient and bedside RN Mango Denton.    Review of Systems  Review of Systems   Unable to perform ROS: Medical condition     all other systems reviewed and are negative    Past Medical History   has a past medical history of Anesthesia, Arrhythmia, Cancer (HCC) (2013), Indigestion, Infectious disease (2007), Pacemaker, Pneumonia, and Sleep apnea.    Surgical History   has a past surgical history that includes other (01/01/2010); thoracic laminectomy (07/22/2013); lesion excision neuro (07/22/2013); recovery (07/29/2013); other abdominal surgery (01/01/1999); other neurological surg (01/01/2007); other orthopedic surgery (01/01/2012); other orthopedic surgery; other orthopedic surgery; other orthopedic surgery; other orthopedic surgery;  "other orthopedic surgery (01/01/2006); cervical laminectomy posterior (11/12/2013); cervical decompression posterior (11/12/2013); cervical foraminotomy (11/12/2013); lumbar laminectomy diskectomy (09/19/2018); pacemaker insertion; and thoracic laminectomy (4/7/2025).    Family History  family history is not on file.    Social History   reports that he has never smoked. He has never used smokeless tobacco. He reports that he does not drink alcohol and does not use drugs.    Allergies  No Known Allergies    Medications  No current facility-administered medications on file prior to encounter.     Current Outpatient Medications on File Prior to Encounter   Medication Sig Dispense Refill    metoprolol SR (TOPROL XL) 25 MG TABLET SR 24 HR Take 25 mg by mouth every day.      loratadine (CLARITIN) 10 MG Tab Take 10 mg by mouth 1 time a day as needed (allergies).      lansoprazole (PREVACID) 30 MG CPDR Take 30 mg by mouth every day.         Physical Exam  Weight/BMI: Body mass index is 23.31 kg/m².  /77   Pulse 70   Temp 36.3 °C (97.3 °F) (Temporal)   Resp 16   Ht 1.981 m (6' 6\")   Wt 91.5 kg (201 lb 11.5 oz)   SpO2 91%    Vitals:    04/08/25 1336 04/08/25 1342 04/08/25 1349 04/08/25 1358   BP: 115/74 116/77 115/78 121/77   Pulse: 70 67 70    Resp: 16 18 18 16   Temp:       TempSrc:       SpO2: 99% 92% 91%    Weight:       Height:        Oxygen Therapy:  Pulse Oximetry: 91 %, O2 (LPM): 2, O2 Delivery Device: Nasal Cannula  Physical Exam  Constitutional:       General: He is not in acute distress.     Appearance: He is well-developed. He is ill-appearing. He is not diaphoretic.   HENT:      Head: Normocephalic and atraumatic.      Right Ear: External ear normal.      Left Ear: External ear normal.      Mouth/Throat:      Pharynx: No oropharyngeal exudate or posterior oropharyngeal erythema.   Eyes:      General:         Right eye: No discharge.         Left eye: No discharge.      Extraocular Movements: " Extraocular movements intact.   Cardiovascular:      Rate and Rhythm: Normal rate.      Heart sounds:      No gallop.   Pulmonary:      Effort: No respiratory distress.      Breath sounds: No wheezing.   Abdominal:      General: There is no distension.      Tenderness: There is no abdominal tenderness. There is no guarding.   Skin:     General: Skin is warm.   Neurological:      Mental Status: He is alert. He is disoriented.      Motor: No weakness.   Psychiatric:      Comments: Unable to assess         Laboratory:   Objective   Recent Results (from the past 24 hours)   CRP Quantitive (Non-Cardiac)    Collection Time: 04/07/25  4:24 PM   Result Value Ref Range    Stat C-Reactive Protein <0.30 0.00 - 0.75 mg/dL   Prealbumin    Collection Time: 04/07/25  4:24 PM   Result Value Ref Range    Pre-Albumin 21.4 18.0 - 38.0 mg/dL   ABO Rh Confirm    Collection Time: 04/08/25  2:38 AM   Result Value Ref Range    ABO Rh Confirm A NEG    Basic Metabolic Panel (BMP)    Collection Time: 04/08/25  2:38 AM   Result Value Ref Range    Sodium 136 135 - 145 mmol/L    Potassium 5.1 3.6 - 5.5 mmol/L    Chloride 102 96 - 112 mmol/L    Co2 23 20 - 33 mmol/L    Glucose 138 (H) 65 - 99 mg/dL    Bun 17 8 - 22 mg/dL    Creatinine 0.80 0.50 - 1.40 mg/dL    Calcium 9.0 8.5 - 10.5 mg/dL    Anion Gap 11.0 7.0 - 16.0   CBC without Differential    Collection Time: 04/08/25  2:38 AM   Result Value Ref Range    WBC 11.3 (H) 4.8 - 10.8 K/uL    RBC 4.61 (L) 4.70 - 6.10 M/uL    Hemoglobin 15.8 14.0 - 18.0 g/dL    Hematocrit 46.2 42.0 - 52.0 %    .2 (H) 81.4 - 97.8 fL    MCH 34.3 (H) 27.0 - 33.0 pg    MCHC 34.2 32.3 - 36.5 g/dL    RDW 47.2 35.9 - 50.0 fL    Platelet Count 192 164 - 446 K/uL    MPV 10.8 9.0 - 12.9 fL   Prothrombin Time (INR)    Collection Time: 04/08/25  2:38 AM   Result Value Ref Range    PT 15.1 (H) 12.0 - 14.6 sec    INR 1.19 (H) 0.87 - 1.13   APTT    Collection Time: 04/08/25  2:38 AM   Result Value Ref Range    APTT 24.9 24.7  - 36.0 sec   CRP Quantitive (Non-Cardiac)    Collection Time: 25  2:38 AM   Result Value Ref Range    Stat C-Reactive Protein 0.37 0.00 - 0.75 mg/dL   Prealbumin    Collection Time: 25  2:38 AM   Result Value Ref Range    Pre-Albumin 20.0 18.0 - 38.0 mg/dL   ESTIMATED GFR    Collection Time: 25  2:38 AM   Result Value Ref Range    GFR (CKD-EPI) 96 >60 mL/min/1.73 m 2   EKG    Collection Time: 25  1:39 PM   Result Value Ref Range    Report       [facility]    Test Date:  2025  Pt Name:    FRANKI STANLEY                Department: 131  MRN:        [id number]                      Room:       [facility]  Gender:     Male                         Technician: NETTE  :        1955                   Requested By:PENELOPE GRAY  Order #:    [id number]                    Reading MD:    Measurements  Intervals                                Axis  Rate:       70                           P:          0  MA:         32                           QRS:        128  QRSD:       142                          T:          24  QT:         458  QTc:        495    Interpretive Statements  Sinus rhythm  Short MA interval  Right bundle branch block  Lateral infarct, old  Compared to ECG 2025 11:58:18  Short MA interval now present  Right bundle-branch block now present  Myocardial infarct finding now present  Atrial fibrillation no longer present  Ventricular-paced complex(es) or rhythm no longer present         (click the triangle to expand results)    Imaging:  DX-PORTABLE FLUORO > 1 HOUR   Final Result      Portable fluoroscopy utilized for 5 seconds.      INTERPRETING LOCATION: [address] [place] [address], [id number]      DX-THORACOLUMBAR SPINE-2 VIEWS   Final Result      Portable fluoroscopy as described.      CT-HEAD W/O    (Results Pending)     EKG  Per my personal read  QTc 495, heart rate 70, normal sinus rhythm with RBBB, no ST or T wave changes.    Assessment/Plan:  Junctional bradycardia  Assessment &  Plan  Seen on tele without PM firing   Monitor overnight and consider cardiology consult    Dysphagia  Assessment & Plan  NPO and will have speech see given dysphagia    Encephalopathy  Assessment & Plan  Discussed with neurology and this does not sound like a stroke   CT head pending per neurosurgery  Narcan and D50 had no effect, unlikely to be due to narcotics or hypoglycemia  Likely due to hypotensive episodes.  Will monitor on telemetry and give it time to improve.    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  Thank you for the consult, we will continue to follow this patient.

## 2025-04-08 NOTE — PROGRESS NOTES
0019 4/8/25: Spoke with Dr. Slaughter and informed him that patient refused oral meds due to he wants to administer his meds on his PEG/G tube but he is currently on strict bedrest ( flat and straight).

## 2025-04-08 NOTE — ASSESSMENT & PLAN NOTE
Discussed with neurology and this does not sound like a stroke   CT head shows no acute findings   Narcan and D50 had no effect, unlikely to be due to narcotics or hypoglycemia  Likely due to hypotensive episode  Not resolved  Possible pulmonary embolism, however patient declines CTA  Recommend resuming home xarelto for hx a fib as soon as is deemed safe by surgery team

## 2025-04-08 NOTE — PROGRESS NOTES
"Neurosurgery  POD# 1  Flat in bed overnight  Rashid catheter remains  Denies nausea, vomiting, headache  Pain controlled on current medication regimen  Leg symptoms improved  Some new flank pain overnight    Objective:  /70   Pulse 69   Temp 36.3 °C (97.3 °F) (Temporal)   Resp 16   Ht 1.981 m (6' 6\")   Wt 91.5 kg (201 lb 11.5 oz)   SpO2 98%     Intake/Output Summary (Last 24 hours) at 4/8/2025 0748  Last data filed at 4/8/2025 0700  Gross per 24 hour   Intake 923.25 ml   Output 2000 ml   Net -1076.75 ml       Recent Labs     04/08/25  0238   WBC 11.3*   RBC 4.61*   HEMOGLOBIN 15.8   HEMATOCRIT 46.2   .2*   MCH 34.3*   MCHC 34.2   RDW 47.2   PLATELETCT 192   MPV 10.8     Recent Labs     04/08/25  0238   SODIUM 136   POTASSIUM 5.1   CHLORIDE 102   CO2 23   GLUCOSE 138*   BUN 17     Recent Labs     04/08/25  0238   APTT 24.9   INR 1.19*     VSS  LS  ROBER drain removed midnight per orders  Surgical incision clean, trace drainage intact, no evidence of infection  Strength:  Lower extremities are 5/5 grossly  Otherwise neurologically intact    Assessment:  Active Hospital Problems    Diagnosis     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 Elevate HOB now, sit in chair/dangle legs, ambulate with PT/OT as tolerated  2. Advance diet as tolerated - G-tube okay for oral intake  3. D/c PCA, D/c rashid catheter  4. Lovenox today, instruct patient on self administration  5. Home health anticipated for d/c, discussed with case management  6. Add Lyrica 50mg BID for flank pain  7. Aim for home this afternoon, meds to beds later today, call with any questions  "

## 2025-04-08 NOTE — DISCHARGE PLANNING
"Patient pending PT/OT evaluations/recommendations.   Neurosurgery AUDELIA anticipates home this evening (which is patient's desire). Patient reports he \"can get a ride home\". AUDELIA recommending home health, writer met with patient to discuss home health but he declined stating, \"I'm fine without it\".     -2nd IMM delivered to and signed by patient today, 4/8/2025, at 0814. Patient verbalized understanding of his rights as a Medicare patient, including his right to appeal discharge. He is eager to discharge once he can ambulate. He will resume G-tube feedings with [facility].     -CM remains available to arrange home health if patient changes his mind and wishes for those services.     Case Management Discharge Planning    Admission Date: 4/7/2025  GMLOS: 2.5  ALOS: 1    6-Clicks ADL Score:    6-Clicks Mobility Score:    PT and/or OT Eval ordered: Yes  Post-acute Referrals Ordered: No  Post-acute Choice Obtained: No  Has referral(s) been sent to post-acute provider:  No    Anticipated Discharge Dispo: Discharge Disposition: D/T to SNF with Medicare cert in anticipation of skilled care (03)    DME Needed: No    Action(s) Taken: Updated Provider/Nurse on Discharge Plan and Patient Conference    Escalations Completed: PT    Medically Clear: Pending elevation of head and advance towards working with PT/OT today, and pain control. Neurosurgery anticipates clearance home this evening.     Next Steps: PT    Barriers to Discharge: Medical clearance and Pending PT Evaluation    Is the patient up for discharge tomorrow: Today, 4/8/2025, or tomorrow, 4/9/2025.         "

## 2025-04-08 NOTE — THERAPY
Occupational Therapy   Initial Evaluation     Patient Name: Chevy Angeles  Age:  69 y.o., Sex:  male  Medical Record #: [id number]  Today's Date: 4/8/2025     Precautions  Precautions: Fall Risk, PEG Tube, Spinal / Back Precautions   Comments: G tube; no brace ordered    Assessment  Patient is 69 y.o. 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 PMHx of G tube, afib, SCI 2013, s/p PPM, and L frontal lobe lesion. Reports lives alone and doesn't have anyone who can assist him with IADLs. Reports he has a friend who can give him a ride home, but appears he is unable to assist more than that. Pt w/questionable receptivity to education; recommend reinforcement. Currently limited by decreased functional mobility, activity tolerance, cognition, strength, AROM, coordination, balance, adherence to precautions, and pain which are currently affecting pt's ability to complete ADLs/txfs/IADLs at baseline. Will continue to follow.     Plan    Occupational Therapy Initial Treatment Plan   Treatment Interventions: Self Care / Activities of Daily Living, Adaptive Equipment, Neuro Re-Education / Balance, Therapeutic Exercises, Therapeutic Activity, Family / Caregiver Training  Treatment Frequency: 5 Times per Week  Duration: Until Therapy Goals Met    DC Equipment Recommendations: Unable to determine at this time, Grab Bar(s) in Tub / Shower, Raised Toilet Seat with Arms, Reacher  Discharge Recommendations: Recommend post-acute placement for additional occupational therapy services prior to discharge home      Objective       04/08/25 1218   Prior Living Situation   Prior Services Home-Independent   Housing / Facility 1 Story House   Steps Into Home   (ramp)   Bathroom Set up Walk In Shower   Equipment Owned Single Point Cane;Tub / Shower Seat;Bed Side Commode   Lives with - Patient's Self Care Capacity Alone and Able to Care For Self   Reports lives alone and doesn't have anyone who can  assist him with IADLs. Reports he has a friend who can give him a ride home, but appears he is unable to assist more than that.   Prior Level of ADL Function   Self Feeding Independent   Grooming / Hygiene Independent   Bathing Independent   Dressing Independent   Toileting Independent   Prior Level of IADL Function   Medication Management Independent   Laundry Independent   Kitchen Mobility Independent   Finances Independent   Home Management Independent   Shopping Independent   Prior Level Of Mobility Independent With Device in Home   Precautions   Precautions Fall Risk;PEG Tube;Spinal / Back Precautions    Comments G tube; no brace ordered   Vitals   O2 Delivery Device Room air w/o2 available   Vitals Comments no O2 worn on entry; SpO2 >90% throughout after functional mobility   Pain 0 - 10 Group   Location Back   Therapist Pain Assessment Post Activity;During Activity;Nurse Notified  (not quantified)   Cognition    Cognition / Consciousness X   Level of Consciousness Alert   Safety Awareness Impaired   New Learning Impaired   Comments pleasant and cooperative; decreased problem solving and insight into deficits and modification needed to maintain precautions. recommend reinforcement   Passive ROM Upper Body   Passive ROM Upper Body WDL   Active ROM Upper Body   Active ROM Upper Body  X   Comments R hand w/impaired extension; reports is baseline   Strength Upper Body   Upper Body Strength  X   Comments R /pinch impaired; reports is baseline   Coordination Upper Body   Coordination X   Comments FM coordination impaired at baseline   Balance Assessment   Sitting Balance (Static) Fair +   Sitting Balance (Dynamic) Fair   Standing Balance (Static) Fair -   Standing Balance (Dynamic) Fair -   Weight Shift Sitting Fair   Weight Shift Standing Fair   Comments w/FWW   Bed Mobility    Scooting Supervised   Comments found at EOB; left in chair w/PT   ADL Assessment   Eating Supervision   Grooming Minimal  Assist;Seated  (dizziness/fatigue with standing. sat on BSC during g/h at sink)   Lower Body Dressing Moderate Assist   Toileting Minimal Assist  (declined need, but able to get to toilet. likely safet seated than standing)   Comments Educated on spinal precautions, adaptive techniques for ADLs/txfs and IADLs, home safety, energy conservation techniques, reacher for LB dressing and IADLs (laundry), and importance of continued OOB activity. Recommend reinforcement.   Functional Mobility   Sit to Stand Minimal Assist   Bed, Chair, Wheelchair Transfer Minimal Assist   Toilet Transfers Minimal Assist  (BSC, likely will need more assist off low toilet)   Transfer Method Stand Step   Mobility w/FWW; EOB>sink>BSC>chair   Edema / Skin Assessment   Edema / Skin  Not Assessed   Activity Tolerance   Comments found EOB and left in chair; limited by fatigue, cognition, and pain   Patient / Family Goals   Patient / Family Goal #1 to go home   Short Term Goals   Short Term Goal # 1 LB dressing with SPV and AE PRN   Short Term Goal # 2 toilet txf with SPV   Short Term Goal # 3 verbalize and maintain spinal precautions during ADLs with SPV and no v/cs   Short Term Goal # 4 standing g/h w/SPV   Education Group   Education Provided Role of Occupational Therapist;Pathology of bedrest   Role of Occupational Therapist Patient Response Patient;Acceptance;Explanation;Verbal Demonstration;Reinforcement Needed   Pathology of Bedrest Patient Response Patient;Acceptance;Explanation;Verbal Demonstration;Reinforcement Needed   Occupational Therapy Initial Treatment Plan    Treatment Interventions Self Care / Activities of Daily Living;Adaptive Equipment;Neuro Re-Education / Balance;Therapeutic Exercises;Therapeutic Activity;Family / Caregiver Training   Treatment Frequency 5 Times per Week   Duration Until Therapy Goals Met   Problem List   Problem List Decreased Active Daily Living Skills;Decreased Upper Extremity Strength Right;Decreased  Homemaking Skills;Decreased Upper Extremity Strength Left;Decreased Functional Mobility;Decreased Activity Tolerance;Decreased Upper Extremity AROM Right;Safety Awareness Deficits / Cognition;Impaired Coordination Right Upper Extremity;Impaired Coordination Left Upper Extremity;Impaired Postural Control / Balance;Limited Knowledge of Post Op Precautions

## 2025-04-09 ENCOUNTER — APPOINTMENT (OUTPATIENT)
Dept: RADIOLOGY | Facility: MEDICAL CENTER | Age: 70
DRG: 029 | End: 2025-04-09
Attending: PHYSICIAN ASSISTANT
Payer: MEDICARE

## 2025-04-09 LAB
AMMONIA PLAS-SCNC: 26 UMOL/L (ref 11–45)
ANION GAP SERPL CALC-SCNC: 8 MMOL/L (ref 7–16)
APTT PPP: 27.8 SEC (ref 24.7–36)
BUN SERPL-MCNC: 21 MG/DL (ref 8–22)
CALCIUM SERPL-MCNC: 8.4 MG/DL (ref 8.5–10.5)
CHLORIDE SERPL-SCNC: 106 MMOL/L (ref 96–112)
CO2 SERPL-SCNC: 24 MMOL/L (ref 20–33)
CORTIS SERPL-MCNC: 7.9 UG/DL (ref 0–23)
CREAT SERPL-MCNC: 0.75 MG/DL (ref 0.5–1.4)
EKG IMPRESSION: NORMAL
ERYTHROCYTE [DISTWIDTH] IN BLOOD BY AUTOMATED COUNT: 48.6 FL (ref 35.9–50)
GFR SERPLBLD CREATININE-BSD FMLA CKD-EPI: 98 ML/MIN/1.73 M 2
GLUCOSE SERPL-MCNC: 96 MG/DL (ref 65–99)
HCT VFR BLD AUTO: 44.7 % (ref 42–52)
HGB BLD-MCNC: 14.9 G/DL (ref 14–18)
INR PPP: 1.19 (ref 0.87–1.13)
MCH RBC QN AUTO: 33.5 PG (ref 27–33)
MCHC RBC AUTO-ENTMCNC: 33.3 G/DL (ref 32.3–36.5)
MCV RBC AUTO: 100.4 FL (ref 81.4–97.8)
PLATELET # BLD AUTO: 157 K/UL (ref 164–446)
PMV BLD AUTO: 10.9 FL (ref 9–12.9)
POTASSIUM SERPL-SCNC: 4 MMOL/L (ref 3.6–5.5)
PROTHROMBIN TIME: 15.1 SEC (ref 12–14.6)
RBC # BLD AUTO: 4.45 M/UL (ref 4.7–6.1)
SODIUM SERPL-SCNC: 138 MMOL/L (ref 135–145)
TSH SERPL DL<=0.005 MIU/L-ACNC: 2.22 UIU/ML (ref 0.38–5.33)
WBC # BLD AUTO: 9.6 K/UL (ref 4.8–10.8)

## 2025-04-09 PROCEDURE — 92610 EVALUATE SWALLOWING FUNCTION: CPT

## 2025-04-09 PROCEDURE — A9270 NON-COVERED ITEM OR SERVICE: HCPCS | Performed by: NEUROLOGICAL SURGERY

## 2025-04-09 PROCEDURE — 36415 COLL VENOUS BLD VENIPUNCTURE: CPT

## 2025-04-09 PROCEDURE — 82140 ASSAY OF AMMONIA: CPT

## 2025-04-09 PROCEDURE — 99232 SBSQ HOSP IP/OBS MODERATE 35: CPT | Performed by: STUDENT IN AN ORGANIZED HEALTH CARE EDUCATION/TRAINING PROGRAM

## 2025-04-09 PROCEDURE — 85730 THROMBOPLASTIN TIME PARTIAL: CPT

## 2025-04-09 PROCEDURE — 700111 HCHG RX REV CODE 636 W/ 250 OVERRIDE (IP): Mod: JZ | Performed by: PHYSICIAN ASSISTANT

## 2025-04-09 PROCEDURE — 82533 TOTAL CORTISOL: CPT

## 2025-04-09 PROCEDURE — 99024 POSTOP FOLLOW-UP VISIT: CPT | Performed by: PHYSICIAN ASSISTANT

## 2025-04-09 PROCEDURE — 80048 BASIC METABOLIC PNL TOTAL CA: CPT

## 2025-04-09 PROCEDURE — 93010 ELECTROCARDIOGRAM REPORT: CPT | Performed by: INTERNAL MEDICINE

## 2025-04-09 PROCEDURE — 700105 HCHG RX REV CODE 258: Performed by: HOSPITALIST

## 2025-04-09 PROCEDURE — 700102 HCHG RX REV CODE 250 W/ 637 OVERRIDE(OP): Performed by: NEUROLOGICAL SURGERY

## 2025-04-09 PROCEDURE — 85027 COMPLETE CBC AUTOMATED: CPT

## 2025-04-09 PROCEDURE — 97530 THERAPEUTIC ACTIVITIES: CPT

## 2025-04-09 PROCEDURE — 85610 PROTHROMBIN TIME: CPT

## 2025-04-09 PROCEDURE — 700102 HCHG RX REV CODE 250 W/ 637 OVERRIDE(OP): Performed by: PHYSICIAN ASSISTANT

## 2025-04-09 PROCEDURE — A9270 NON-COVERED ITEM OR SERVICE: HCPCS | Performed by: PHYSICIAN ASSISTANT

## 2025-04-09 PROCEDURE — 84443 ASSAY THYROID STIM HORMONE: CPT

## 2025-04-09 PROCEDURE — 770020 HCHG ROOM/CARE - TELE (206)

## 2025-04-09 RX ORDER — PREGABALIN 25 MG/1
50 CAPSULE ORAL 2 TIMES DAILY
Status: DISCONTINUED | OUTPATIENT
Start: 2025-04-09 | End: 2025-04-10 | Stop reason: HOSPADM

## 2025-04-09 RX ADMIN — CEPHALEXIN 500 MG: 500 CAPSULE ORAL at 17:55

## 2025-04-09 RX ADMIN — Medication 1 APPLICATOR: at 17:56

## 2025-04-09 RX ADMIN — ACETAMINOPHEN 1000 MG: 500 TABLET, FILM COATED ORAL at 05:51

## 2025-04-09 RX ADMIN — CEPHALEXIN 500 MG: 500 CAPSULE ORAL at 20:33

## 2025-04-09 RX ADMIN — LANSOPRAZOLE 30 MG: 30 TABLET, ORALLY DISINTEGRATING, DELAYED RELEASE ORAL at 05:51

## 2025-04-09 RX ADMIN — SENNOSIDES AND DOCUSATE SODIUM 1 TABLET: 50; 8.6 TABLET ORAL at 20:33

## 2025-04-09 RX ADMIN — ENOXAPARIN SODIUM 40 MG: 100 INJECTION SUBCUTANEOUS at 05:51

## 2025-04-09 RX ADMIN — SODIUM CHLORIDE: 9 INJECTION, SOLUTION INTRAVENOUS at 23:07

## 2025-04-09 RX ADMIN — SODIUM CHLORIDE: 9 INJECTION, SOLUTION INTRAVENOUS at 05:47

## 2025-04-09 RX ADMIN — PREGABALIN 50 MG: 25 CAPSULE ORAL at 05:51

## 2025-04-09 RX ADMIN — DOCUSATE SODIUM 100 MG: 50 LIQUID ORAL at 05:51

## 2025-04-09 RX ADMIN — ACETAMINOPHEN 1000 MG: 500 TABLET, FILM COATED ORAL at 00:23

## 2025-04-09 RX ADMIN — CEPHALEXIN 500 MG: 500 CAPSULE ORAL at 05:51

## 2025-04-09 RX ADMIN — Medication 1 APPLICATOR: at 05:52

## 2025-04-09 RX ADMIN — PREGABALIN 50 MG: 25 CAPSULE ORAL at 17:54

## 2025-04-09 RX ADMIN — CEPHALEXIN 500 MG: 500 CAPSULE ORAL at 12:01

## 2025-04-09 RX ADMIN — DOCUSATE SODIUM 100 MG: 50 LIQUID ORAL at 17:53

## 2025-04-09 RX ADMIN — ACETAMINOPHEN 1000 MG: 500 TABLET, FILM COATED ORAL at 12:01

## 2025-04-09 RX ADMIN — ACETAMINOPHEN 1000 MG: 500 TABLET, FILM COATED ORAL at 17:54

## 2025-04-09 ASSESSMENT — COGNITIVE AND FUNCTIONAL STATUS - GENERAL
CLIMB 3 TO 5 STEPS WITH RAILING: A LITTLE
MOVING TO AND FROM BED TO CHAIR: A LITTLE
CLIMB 3 TO 5 STEPS WITH RAILING: A LITTLE
SUGGESTED CMS G CODE MODIFIER MOBILITY: CK
STANDING UP FROM CHAIR USING ARMS: A LITTLE
MOVING FROM LYING ON BACK TO SITTING ON SIDE OF FLAT BED: A LITTLE
MOBILITY SCORE: 18
MOVING TO AND FROM BED TO CHAIR: A LITTLE
TURNING FROM BACK TO SIDE WHILE IN FLAT BAD: A LITTLE
WALKING IN HOSPITAL ROOM: A LITTLE
DRESSING REGULAR LOWER BODY CLOTHING: A LOT
TOILETING: A LOT
PERSONAL GROOMING: A LITTLE
HELP NEEDED FOR BATHING: A LOT
DAILY ACTIVITIY SCORE: 16
WALKING IN HOSPITAL ROOM: A LITTLE
STANDING UP FROM CHAIR USING ARMS: A LITTLE
TURNING FROM BACK TO SIDE WHILE IN FLAT BAD: A LITTLE
SUGGESTED CMS G CODE MODIFIER DAILY ACTIVITY: CK
MOVING FROM LYING ON BACK TO SITTING ON SIDE OF FLAT BED: A LITTLE
SUGGESTED CMS G CODE MODIFIER MOBILITY: CK
MOBILITY SCORE: 18
DRESSING REGULAR UPPER BODY CLOTHING: A LITTLE

## 2025-04-09 ASSESSMENT — ENCOUNTER SYMPTOMS
COUGH: 0
NAUSEA: 0
BACK PAIN: 1
ABDOMINAL PAIN: 0
VOMITING: 0
INSOMNIA: 0
BLURRED VISION: 0
FEVER: 0
CHILLS: 0
DIZZINESS: 0
HEADACHES: 0
EYE PAIN: 0
SHORTNESS OF BREATH: 0
PALPITATIONS: 0

## 2025-04-09 ASSESSMENT — PAIN DESCRIPTION - PAIN TYPE
TYPE: ACUTE PAIN;SURGICAL PAIN
TYPE: ACUTE PAIN
TYPE: ACUTE PAIN
TYPE: ACUTE PAIN;SURGICAL PAIN

## 2025-04-09 ASSESSMENT — GAIT ASSESSMENTS
GAIT LEVEL OF ASSIST: CONTACT GUARD ASSIST
ASSISTIVE DEVICE: SINGLE POINT CANE
DISTANCE (FEET): 90
DEVIATION: BRADYKINETIC;DECREASED BASE OF SUPPORT;OTHER (COMMENT)

## 2025-04-09 ASSESSMENT — LIFESTYLE VARIABLES: SUBSTANCE_ABUSE: 0

## 2025-04-09 NOTE — THERAPY
Physical Therapy   Daily Treatment     Patient Name: Chevy Angeles  Age:  69 y.o., Sex:  male  Medical Record #: [id number]  Today's Date: 4/9/2025     Precautions  Precautions: Fall Risk;Spinal / Back Precautions ;PEG Tube;Other (See Comments)  Comments: No brace required this admission, Pump device-back    Assessment    Patient seen for PT treatment session. Patient ambulating in the hallway with CNA, patient agreeable for the session. Able to participate with bed mobility, EOB sitting, ambulation with SPC, navigate stairs as detailed below. Will continue to benefit from PT services and recommend post-acute placement at this time.      Plan    Treatment Plan Status: (P) Continue Current Treatment Plan  Type of Treatment: Bed Mobility, Equipment, Family / Caregiver Training, Gait Training, Manual Therapy, Neuro Re-Education / Balance, Self Care / Home Evaluation, Therapeutic Activities, Therapeutic Exercise  Treatment Frequency: 5 Times per Week  Treatment Duration: Until Therapy Goals Met    DC Equipment Recommendations: (P) None (Owns SPC)  Discharge Recommendations: (P) Recommend post-acute placement for additional physical therapy services prior to discharge home (May progress with functional mobility and be able to return home with  PT and family/friend supervision)    Objective     04/09/25 0948   Time In/Time Out   Therapy Start Time 0924   Therapy End Time 0948   Total Therapy Time 24   Precautions   Precautions Fall Risk;Spinal / Back Precautions ;PEG Tube;Other (See Comments)   Comments No brace required this admission, Pump device-back   Vitals   Pulse 71   Patient BP Position Sitting  (EOB, post ambulation)   Blood Pressure  (!) 84/51  (MAP 62)   Pulse Oximetry 94 %   O2 Delivery Device None - Room Air   Vitals Comments Supine: BP 90/53, MAP 65, HR 68. RN alerted regarding low BP. Patient asymptomatic through out the session.   Pain   Pain Scales 0 to 10 Scale    Intervention Declines   Cognition     Cognition / Consciousness X   Speech/ Communication Delayed Responses   Orientation Level Other (Comment)  (Oriented to self, place, reason)   Level of Consciousness Alert   Safety Awareness Impaired   Other Treatments   Other Treatments Provided Patient educated about maintaining each position for brief minute prior to changing positions due to low BP. Discussed about DC recommendations, patient prefers to return home at this time, explained about DC concerns to home-limited support, low blood pressure, impaired balance, increased risk of fall, altered mentation?. Patient mentioned that he has a friend who could come in and assist if needed. Reinforced importance of daily & frequent mobility, OOB to chair for meals and ambulate with assistance/supervision from nursing staff.   Balance   Sitting Balance (Static) Fair +   Sitting Balance (Dynamic) Fair   Standing Balance (Static) Fair   Standing Balance (Dynamic) Fair -   Weight Shift Sitting Good   Weight Shift Standing Fair   Skilled Intervention Verbal Cuing;Facilitation   Comments Seated EOB; Standing with SPC   Bed Mobility    Supine to Sit Supervised   Sit to Supine Supervised   Scooting Supervised   Rolling Supervised   Skilled Intervention Verbal Cuing;Sequencing;Facilitation   Comments HOB flat, without use of rails, cues for log roll technique   Gait Analysis   Gait Level Of Assist Contact Guard Assist   Assistive Device Single Point Cane   Distance (Feet) 90   # of Times Distance was Traveled 2   Deviation Bradykinetic;Decreased Base Of Support;Other (Comment)  (slight forward trunk lean)   # of Stairs Climbed 4  (B/L rails & SPC)   Level of Assist with Stairs Contact Guard Assist   Skilled Intervention Verbal Cuing;Sequencing;Facilitation;Compensatory Strategies   Comments Gait-cues for pacing, directions, upright trunk posture; Stairs-cues for sequencing of LE/AD, pacing. Patient was ambulating in the hallway with CNA, PT took over.   Functional  Mobility   Sit to Stand Contact Guard Assist   Bed, Chair, Wheelchair Transfer Unable to Participate  (Low BP)   Mobility Walking in the hallway with CNA-ambulate in the hallway with SPC-navigate stairs-ambulate in the room-EOB-bed-EOB-bed-hand off to RN   Skilled Intervention Verbal Cuing;Facilitation   Comments W SPC; cues for hand placement, LE placement   6 Clicks Assessment - How much HELP from from another person do you currently need... (If the patient hasn't done an activity recently, how much help from another person do you think he/she would need if he/she tried?)   Turning from your back to your side while in a flat bed without using bedrails? 3   Moving from lying on your back to sitting on the side of a flat bed without using bedrails? 3   Moving to and from a bed to a chair (including a wheelchair)? 3   Standing up from a chair using your arms (e.g., wheelchair, or bedside chair)? 3   Walking in hospital room? 3   Climbing 3-5 steps with a railing? 3   6 clicks Mobility Score 18   Patient / Family Goals    Patient / Family Goal #1 To return home   Goal #1 Outcome Progressing as expected   Short Term Goals    Short Term Goal # 1 Pt will perform supine < > sit SPV with bed flat, no rail in 6 visits in order to set up for upright mobility   Goal Outcome # 1 Goal met   Short Term Goal # 2 Pt will perform sit < > stand SPV in 6 visits in order to prepare for ambulation   Goal Outcome # 2 Progressing as expected   Short Term Goal # 3 Pt will ambulate 150' SPV /c FWW in 6 visits in order to return home safely   Goal Outcome # 3 Progressing as expected   Physical Therapy Treatment Plan   Physical Therapy Treatment Plan Continue Current Treatment Plan   Anticipated Discharge Equipment and Recommendations   DC Equipment Recommendations None  (Owns SPC)   Discharge Recommendations Recommend post-acute placement for additional physical therapy services prior to discharge home  (May progress with functional  mobility and be able to return home with HH PT and family/friend supervision)   Interdisciplinary Plan of Care Collaboration   IDT Collaboration with  Nursing;Certified Nursing Assistant   Patient Position at End of Therapy In Bed;Bed Alarm On;Call Light within Reach;Tray Table within Reach;Other (Comments)  (RN at bedside)   Session Information   Date / Session Number  4/9-2(2/5, 4/14)

## 2025-04-09 NOTE — CARE PLAN
The patient is Watcher - Medium risk of patient condition declining or worsening    Shift Goals  Clinical Goals: Monitor neuro stataus, VSS, Pain control  Patient Goals: Walk  Family Goals: VIRGIE    Progress made toward(s) clinical / shift goals:  Patient is difficult to assess d/t aphasia. Patient intermittently answers yes/no questions appropriately. Patient responds to name. Patient voiding in urinal. Patient ambulated around unit with FWW. Continuous fluids in place. VSS. Patient denies pain at this time. Strict NPO in place pending SLP re-evaluation. G-tube in use for medication administration and for bolus feeds. Bedis low and locked. Bed alarm on. Call light within reach. Hourly rounding continues.    Patient is not progressing towards the following goals:      Problem: Psychosocial  Goal: Patient's ability to verbalize feelings about condition will improve  4/9/2025 0229 by Frances Roberto R.N.  Outcome: Not Progressing  4/9/2025 0229 by Frances Roberto R.N.  Outcome: Progressing     Problem: Communication  Goal: The ability to communicate needs accurately and effectively will improve  4/9/2025 0229 by Frances Roberto R.N.  Outcome: Not Progressing  Note: Patient presenting with expressive aphasia.  4/9/2025 0229 by Frances Roberto R.N.  Outcome: Progressing

## 2025-04-09 NOTE — PROGRESS NOTES
Monitor Summary: Afib/Aflutter 65-68, RI --, QRS 0.11, QT -- , with rare/occasional PVCs per strip from monitor room.

## 2025-04-09 NOTE — DIETARY
Nutrition Services Brief Update:    Problem: Nutritional:  Goal: Nutrition support tolerated and meeting greater than 85% of estimated needs  Outcome: MET    Pt is receiving TF Jevity 1.5 with goal of 7 cartons per day. Labs/meds reviewed. SLP saw today and recommends NPO.     RD following.

## 2025-04-09 NOTE — PROGRESS NOTES
[facility] Medicine Daily Progress Note    Date of Service  4/9/2025    Chief Complaint  Chevy Angeles is a 69 y.o. male admitted 4/7/2025 with back surgery.  Medicine team consulted for hypotensive episode.    Hospital Course    69 y.o. male who presented previously for neurosurgical procedure.  He was taken by Dr. Castano for decompressive laminectomy and resection of intradural tumor from T11-12 on 4/7.  The following day, patient was doing well and thus was sat up in the chair.  He was then found to have hypotension with BP 60/40.  He was then placed back in bed with improvement of blood pressure.  However, he became confused.  He was not given anything to eat either orally or via the G-tube today.  Earlier in the day, he was AOx4.  After the episode he became AAO x 1.  He did not have any focal weakness but with his confusion he also had difficulty understanding.  The case was discussed with neurosurgery as well as neurology who did not feel that this was consistent with a large vessel stroke.  Thus, patient was transferred to telemetry and CT was ordered by neurosurgery.     Interval Problem Update  No acute events overnight.  Patient does not feel confused, denies any symptoms other than mild back pain by surgical site. Denies chest pain, dyspnea, confusion.  He is alert and oriented x4. He has chronic mild expressive aphasia which I believe is unchanged from baseline.  He is normotensive and hemodynamically stable.  Unclear etiology for his hypotensive episode yesterday.  Discussed my recommendation for CTA chest to rule out pulmonary embolism given his recent surgery. He does not think he has a pulmonary embolism and declines CT testing.  He normally takes xarelta at home for hx of a fib, this was likely stopped pre operatively leading up to his laminectomy. I recommend restarting this as soon as is safe as deemed from surgical perspective.  Cortisol, TSH testing normal.  At this time, other than CTA PE  study, I do not recommend any further testing at this time.  Given patient is medically stable, hospital medicine team will sign off, please call/voalte with any questions.      I have discussed this patient's plan of care and discharge plan at IDT rounds today with Case Management, Nursing, Nursing leadership, and other members of the IDT team.      Code Status  Full Code    Review of Systems  Review of Systems   Constitutional:  Negative for chills and fever.   Eyes:  Negative for blurred vision and pain.   Respiratory:  Negative for cough and shortness of breath.    Cardiovascular:  Negative for chest pain, palpitations and leg swelling.   Gastrointestinal:  Negative for abdominal pain, nausea and vomiting.   Genitourinary:  Negative for dysuria and urgency.   Musculoskeletal:  Positive for back pain.   Skin:  Negative for itching and rash.   Neurological:  Negative for dizziness and headaches.   Psychiatric/Behavioral:  Negative for substance abuse. The patient does not have insomnia.         Physical Exam  Temp:  [36.3 °C (97.3 °F)-36.6 °C (97.9 °F)] 36.6 °C (97.9 °F)  Pulse:  [66-71] 71  Resp:  [16-18] 17  BP: ()/(51-79) 102/60  SpO2:  [91 %-99 %] 94 %    Physical Exam  Constitutional:       General: He is not in acute distress.     Appearance: He is not ill-appearing.   HENT:      Head: Normocephalic and atraumatic.      Right Ear: External ear normal.      Left Ear: External ear normal.      Mouth/Throat:      Pharynx: No oropharyngeal exudate or posterior oropharyngeal erythema.   Eyes:      Extraocular Movements: Extraocular movements intact.      Pupils: Pupils are equal, round, and reactive to light.   Cardiovascular:      Rate and Rhythm: Normal rate and regular rhythm.      Pulses: Normal pulses.      Heart sounds: Normal heart sounds.   Pulmonary:      Effort: Pulmonary effort is normal. No respiratory distress.      Breath sounds: Normal breath sounds. No wheezing.   Abdominal:      General:  Bowel sounds are normal. There is no distension.      Palpations: Abdomen is soft.      Tenderness: There is no abdominal tenderness.   Musculoskeletal:         General: No swelling or tenderness.      Cervical back: Normal range of motion and neck supple.   Skin:     General: Skin is warm and dry.   Neurological:      Mental Status: He is oriented to person, place, and time.      Motor: Weakness present.   Psychiatric:         Mood and Affect: Mood normal.         Behavior: Behavior normal.         Fluids    Intake/Output Summary (Last 24 hours) at 4/9/2025 1254  Last data filed at 4/9/2025 1044  Gross per 24 hour   Intake 1821 ml   Output 785 ml   Net 1036 ml        Laboratory  Recent Labs     04/08/25  0238 04/09/25  1027   WBC 11.3* 9.6   RBC 4.61* 4.45*   HEMOGLOBIN 15.8 14.9   HEMATOCRIT 46.2 44.7   .2* 100.4*   MCH 34.3* 33.5*   MCHC 34.2 33.3   RDW 47.2 48.6   PLATELETCT 192 157*   MPV 10.8 10.9     Recent Labs     04/08/25  0238 04/09/25  1027   SODIUM 136 138   POTASSIUM 5.1 4.0   CHLORIDE 102 106   CO2 23 24   GLUCOSE 138* 96   BUN 17 21   CREATININE 0.80 0.75   CALCIUM 9.0 8.4*     Recent Labs     04/08/25  0238 04/09/25  1027   APTT 24.9 27.8   INR 1.19* 1.19*               Imaging  CT-HEAD W/O   Final Result      1.  Scattered postoperative pneumocephalus.   2.  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   3.  No acute intracranial hemorrhage.   4.  Left retromastoid craniotomy with extensive left cerebellar encephalomalacia.                                 DX-PORTABLE FLUORO > 1 HOUR   Final Result      Portable fluoroscopy utilized for 5 seconds.      INTERPRETING LOCATION: [address] [place] [address] [place], [address]      DX-THORACOLUMBAR SPINE-2 VIEWS   Final Result      Portable fluoroscopy as described.           Assessment/Plan  Junctional bradycardia  Assessment & Plan  Seen on tele without PM firing   Tele monitoring uneventful    Dysphagia  Assessment & Plan  Chronic  On tube  feeds at home with occasional PO intake    Encephalopathy  Assessment & Plan  Discussed with neurology and this does not sound like a stroke   CT head shows no acute findings   Narcan and D50 had no effect, unlikely to be due to narcotics or hypoglycemia  Likely due to hypotensive episode  Not resolved  Possible pulmonary embolism, however patient declines CTA  Recommend resuming home xarelto for hx a fib as soon as is deemed safe by surgery team    Orthostatic hypotension  Assessment & Plan  Giving gentle IV fluids  Improved  Cortisol and TSH normal  Tolerating tube feeding via PEG well    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 Related to anesthesia from surgery? Pulmonary embolism is on differential, however patient declines CTA PE study. Given he is hemodynamically stable, treatment would be to resume anticoagulation. He is on xarelto at home for hx of a fib. Recommend resuming this as soon as is safe from surgery perspective. No further recommendations at this time.  Hospital medicine team will sign off. Please call with any questions.

## 2025-04-09 NOTE — PROGRESS NOTES
Postop check in scene with Dr. Castano. He’s resting comfortably. We are waiting on disposition. At this time, we are working towards rehab. He is open to this tonight. It’s moving is extremities. No pain down his legs. Surgical site is also controlled.    Plan:  One. Continue with postop orders.  Two. Disposition pending:awaited rehab evaluation.

## 2025-04-09 NOTE — PROGRESS NOTES
Oneil removed at 1000, patient unable to void and asymptomatic. Bladder scan completed at 1700 with a result of 350mL. Per the protocol Reassess in 2 hours.

## 2025-04-09 NOTE — THERAPY
"Speech Language Pathology   Clinical Swallow Evaluation     Patient Name: Chevy Angeles  AGE:  69 y.o., SEX:  male  Medical Record #: [id number]  Date of Service: 4/9/2025      History of Present Illness  69M admitted to [facility] 4/7 for T11-12 decompressive laminectomy and resection of intradural tumor.     CMHx: intradural extramedullary spinal tumor, thoracic intradural extramedullary tumor, orthostatic hypotension, encephalopathy, dysphagia, junctional bradycardia    PMHx: benign neoplasm of spinal cord, spinal cord injuries, gastrojejunostomy tube dislodgment, SCI, A-fib, neurofibromatosis syndrome, chronic myelogenous leukemia    SLP attempted to see patient for CSE in 7/2013. Per notes \" Patient refusing 2/2 pain when he coughs. Patient with a history of dysphagia and therefore able to verbalize understanding of SLP recommendations and need to complete swallow evaluation prior to PO intake. Patient verbalized understanding with 100% accuracy and no cues from SLP. Patient aware of NPO status and wants to just use his G-button for now.\"    Imaging:    CT Head 4/8: \"1.  Scattered postoperative pneumocephalus.  2.  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  3.  No acute intracranial hemorrhage.  4.  Left retromastoid craniotomy with extensive left cerebellar encephalomalacia.\"    CXR 3/28: \"IMPRESSION:     There is a pleural-based mass posteriorly which appears to be on the left. If there are no prior outside studies to document stability, further evaluation with CT is suggested.\"    General Information:  Vitals  Pulse: 67  Respiration: 17  Pulse Oximetry: 94 %  O2 (LPM): 0  O2 Delivery Device: None - Room Air  Level of Consciousness: Alert, Awake  Patient Behaviors: Confused (Cooperative)  Orientation: Oriented x 4  Follows Directives: Inconsistent    Prior Living Situation & Level of Function:  Prior Services: Home-Independent  Housing / Facility: [address] [facility]  Lives with - Patient's " Self Care Capacity: Alone and Able to Care For Self  Communication: Unknown PLOF  Swallowing: EMR states Hx dysphagia prior to 2013    Oral Mechanism Evaluation:  Dentition: Fair, Natural dentition   Facial Symmetry: Equal  Facial Sensation: Equal     Labial Observations: WFL   Lingual Observations: Midline  Motor Speech: WFL       Laryngeal Function:  Secretion Management: Excess secretions, suctioning required  Voice Quality: Hoarse (mild)  Cough: Perceptually WNL     Subjective  Pt was seen at bedside for a CSE. Stated he did not want to eat or drink while in the hospital and that he gets everything he needs through his PEG.    Assessment  Current Method of Nutrition: NPO until cleared by speech pathology, PEG tube  Positioning: Shields's (60-90 degrees)  Bolus Administration: Patient  O2 (LPM): 0 O2 Delivery Device: None - Room Air  Factor(s) Affecting Performance: None  Tracheostomy : No     Swallowing Trials:  Ice: WFL  Thin Liquid (TN0): Impaired    Comments: Pt presented w/ PO trials of single ice chip, thin liquid cup sip, and thin liquid rapid, sequential cup sips. Wet, gurgly vocal quality and continued expectoration of secretions/mucous which were also present prior to PO. Denied odynophagia or globus. Education provided on risks for aspiration d/t disuse atrophy that at this time outweigh the risks for aspiration from consuming thin liquid sips and ice chips.    Clinical Impressions  Pt has a reported history of dysphagia and is tube-feed dependent at baseline. Pt is recommended to continue NPO/PEG, meds administered non-orally, and exercise swallow mechanism at this time w/ daily PO of ice chips and/or sips of thin liquid. Risks for aspiration d/t disuse atrophy outweigh the risks for aspiration from consuming thin liquid sips and ice chips. Anticipate that the patient will have no further speech therapy needs after discharge from the hospital, unless his goals should change related to PO, in which case  recommend OP SLP referral. SLP will follow to reinforce education on dysphagia and benefits of continuing ice chip protocol in hospital and at discharge.    Recommendations  Diet Consistency: NPO/PEG w/ ice chips ok  Instrumentation: None indicated at this time  Medication: Non Oral  Supervision: Independent  Positioning: Fully upright and midline during oral intake  Oral Care: Q4h     SLP Treatment Plan  Dysphagia Treatment, Patient/Family/Caregiver Training  SLP Frequency: 1x Per Week  Estimated Duration: Until Therapy Goals Met    Anticipated Discharge Needs  Anticipate that the patient will have no further speech therapy needs after discharge from the hospital   Therapy Recommendations Upon DC: Dysphagia Training, Community Re-Integration      Patient / Family Goal #1: I don't want to eat and drink by mouth  Short Term Goals  Short Term Goal # 1: Pt will participate in dysphagia education including benefits of oral care and uitilizing ice chips and/or sips of water to reduce risk of disuse atrophy  Short Term Goal #2: Pt will consume ice chips with no worsening of respiratory status.       Alireza Alston, Student

## 2025-04-09 NOTE — DISCHARGE PLANNING
[facility] Transitional Care Coordination     Referral from: NIGEL Denton  Insurance Provider on Facesheet: Medicare  Potential Rehab Diagnosis: SCI     Chart review indicates patient may have on going medical management and may have therapy needs to possibly meet inpatient rehab facility criteria with the goal of returning to community.     D/C support: Lives alone     Physiatry consultation pended per protocol. Reached out to attending for medical clearance.     Thank you for the referral.

## 2025-04-09 NOTE — DISCHARGE PLANNING
Case Management Discharge Planning    Admission Date: 4/7/2025  GMLOS: 2.5  ALOS: 2    6-Clicks ADL Score: 16  6-Clicks Mobility Score: 18  PT and/or OT Eval ordered: Yes  Post-acute Referrals Ordered: Yes  Post-acute Choice Obtained: Yes  Has referral(s) been sent to post-acute provider:  Yes      Anticipated Discharge Dispo: Discharge Disposition: D/T to SNF with Medicare cert in anticipation of skilled care (03)    DME Needed: No    Action(s) Taken: Choice obtained  RN CM met with Pt at bedside, Pt agreed for post acute placement. RN cM received verbal approval for [facility]) [facility]) [facility] 3) [facility]. Choice for completed, faxed to [facility] to save in media. Pt needs 3 midnight stay to [facility] SNF criteria.     Escalations Completed: Provider    Medically Clear: No    Next Steps: RN CM to continue to assist in Pt's discharge needs.     Barriers to Discharge: Medical clearance    Is the patient up for discharge tomorrow: Possible

## 2025-04-09 NOTE — PROGRESS NOTES
"Neurosurgery  POD# 2  Mobilizing  Voiding, some post void residual, monitoring for now.  Denies nausea, vomiting, headache  Pain controlled on current medication regimen  Denies and pain, numbness or tingling in his legs, no flank pain today  Had hypotensive episode yesterday with altered level of consciousness, rapid response was initiated, medicine was consulted immediately.   This morning he is conversing with some confusion, this is not his baseline.      Objective:  /77   Pulse 67   Temp 36.6 °C (97.9 °F) (Temporal)   Resp 17   Ht 1.981 m (6' 6\")   Wt 91.5 kg (201 lb 11.5 oz)   SpO2 97%     Intake/Output Summary (Last 24 hours) at 4/8/2025 0748  Last data filed at 4/8/2025 0700  Gross per 24 hour   Intake 923.25 ml   Output 2000 ml   Net -1076.75 ml       Recent Labs     04/08/25  0238   WBC 11.3*   RBC 4.61*   HEMOGLOBIN 15.8   HEMATOCRIT 46.2   .2*   MCH 34.3*   MCHC 34.2   RDW 47.2   PLATELETCT 192   MPV 10.8     Recent Labs     04/08/25  0238   SODIUM 136   POTASSIUM 5.1   CHLORIDE 102   CO2 23   GLUCOSE 138*   BUN 17     Recent Labs     04/08/25  0238   APTT 24.9   INR 1.19*     VSS  LS  CT head showed no acute pathology.  ECG no acute changes, on tele monitor   Alert, oriented to self, time, place and situation, there was initially some confusion, upon further discussion, he is completely oriented, we will continue to observe.  Surgical incision clean, trace drainage on dressing, no evidence of infection  Strength:  Lower extremities are 5/5 grossly  Otherwise neurologically intact    Assessment:  Active Hospital Problems    Diagnosis     Orthostatic hypotension [I95.1]     Encephalopathy [G93.40]     Dysphagia [R13.10]     Junctional bradycardia [R00.1]     Thoracic intradural extramedullary tumor [D49.7]     Intradural extramedullary spinal tumor [D49.7]      POD#2 S/p T11-T12 laminectomy with excision of intradural extramedullary tumor X2  Chemoprophylaxis: Continue with Lovenox, " resume xarelto POD#10    Plan:  1. Ambulate as tolerated, up to chair for meals  2. SLP consult placed by medicine for dysphagia, continue G-tube feeds for now  3. D/c on-q pump today  4. Disposition TBD, he will work with therapies, we will keep an eye of his mentation for now, he is adamant that he would like to go home  5. Medicine management appreciated

## 2025-04-09 NOTE — CARE PLAN
The patient is Stable - Low risk of patient condition declining or worsening    Shift Goals  Clinical Goals: Stable neuro status, safety, pain management  Patient Goals: Rest  Family Goals: VIRGIE    Progress made toward(s) clinical / shift goals:      Problem: Neuro Status  Goal: Neuro status will remain stable or improve  Outcome: Progressing    Q4H neuro checks in place. Pt's neurological status (A/Ox4) remains unchanged throughout shift. Bed alarm is on, call light within reach.     Problem: Mobility  Goal: Patient's capacity to carry out activities will improve  Outcome: Progressing    Bed alarm in place. Pt calls for assistance and is provided appropriate assistive devices when needed. Treaded socks used when ambulating.      Problem: Pain - Standard  Goal: Alleviation of pain or a reduction in pain to the patient’s comfort goal  Outcome: Progressing   Frequent pain assessments throughout shift. PRN pain medications provided per MAR and pt request. Pharmacological and non-pharmacological interventions utilized.      Patient is not progressing towards the following goals:

## 2025-04-10 ENCOUNTER — PHARMACY VISIT (OUTPATIENT)
Dept: PHARMACY | Facility: MEDICAL CENTER | Age: 70
End: 2025-04-10
Payer: COMMERCIAL

## 2025-04-10 ENCOUNTER — HOME HEALTH ADMISSION (OUTPATIENT)
Dept: HOME HEALTH SERVICES | Facility: HOME HEALTHCARE | Age: 70
End: 2025-04-10
Payer: MEDICARE

## 2025-04-10 VITALS
DIASTOLIC BLOOD PRESSURE: 67 MMHG | RESPIRATION RATE: 17 BRPM | HEART RATE: 68 BPM | SYSTOLIC BLOOD PRESSURE: 101 MMHG | TEMPERATURE: 97.9 F | OXYGEN SATURATION: 88 % | HEIGHT: 78 IN | WEIGHT: 201.72 LBS | BODY MASS INDEX: 23.34 KG/M2

## 2025-04-10 PROCEDURE — 99024 POSTOP FOLLOW-UP VISIT: CPT | Performed by: PHYSICIAN ASSISTANT

## 2025-04-10 PROCEDURE — A9270 NON-COVERED ITEM OR SERVICE: HCPCS | Performed by: PHYSICAL MEDICINE & REHABILITATION

## 2025-04-10 PROCEDURE — 700102 HCHG RX REV CODE 250 W/ 637 OVERRIDE(OP): Performed by: NEUROLOGICAL SURGERY

## 2025-04-10 PROCEDURE — A9270 NON-COVERED ITEM OR SERVICE: HCPCS | Performed by: NEUROLOGICAL SURGERY

## 2025-04-10 PROCEDURE — 97535 SELF CARE MNGMENT TRAINING: CPT

## 2025-04-10 PROCEDURE — 99223 1ST HOSP IP/OBS HIGH 75: CPT | Performed by: PHYSICAL MEDICINE & REHABILITATION

## 2025-04-10 PROCEDURE — 700102 HCHG RX REV CODE 250 W/ 637 OVERRIDE(OP): Performed by: PHYSICAL MEDICINE & REHABILITATION

## 2025-04-10 PROCEDURE — 700111 HCHG RX REV CODE 636 W/ 250 OVERRIDE (IP): Mod: JZ | Performed by: PHYSICIAN ASSISTANT

## 2025-04-10 RX ORDER — MIDODRINE HYDROCHLORIDE 5 MG/1
5 TABLET ORAL
Status: DISCONTINUED | OUTPATIENT
Start: 2025-04-10 | End: 2025-04-10 | Stop reason: HOSPADM

## 2025-04-10 RX ADMIN — ENOXAPARIN SODIUM 40 MG: 100 INJECTION SUBCUTANEOUS at 05:51

## 2025-04-10 RX ADMIN — PREGABALIN 50 MG: 25 CAPSULE ORAL at 05:51

## 2025-04-10 RX ADMIN — Medication 1 APPLICATOR: at 05:51

## 2025-04-10 RX ADMIN — METOPROLOL TARTRATE 25 MG: 25 TABLET, FILM COATED ORAL at 05:51

## 2025-04-10 RX ADMIN — LANSOPRAZOLE 30 MG: 30 TABLET, ORALLY DISINTEGRATING, DELAYED RELEASE ORAL at 05:51

## 2025-04-10 RX ADMIN — CEPHALEXIN 500 MG: 500 CAPSULE ORAL at 05:51

## 2025-04-10 RX ADMIN — ACETAMINOPHEN 1000 MG: 500 TABLET, FILM COATED ORAL at 12:01

## 2025-04-10 RX ADMIN — DOCUSATE SODIUM 100 MG: 50 LIQUID ORAL at 05:51

## 2025-04-10 RX ADMIN — ACETAMINOPHEN 1000 MG: 500 TABLET, FILM COATED ORAL at 05:51

## 2025-04-10 RX ADMIN — CEPHALEXIN 500 MG: 500 CAPSULE ORAL at 12:00

## 2025-04-10 RX ADMIN — MIDODRINE HYDROCHLORIDE 5 MG: 5 TABLET ORAL at 12:00

## 2025-04-10 ASSESSMENT — COGNITIVE AND FUNCTIONAL STATUS - GENERAL
TOILETING: A LITTLE
DRESSING REGULAR LOWER BODY CLOTHING: A LITTLE
SUGGESTED CMS G CODE MODIFIER DAILY ACTIVITY: CJ
DAILY ACTIVITIY SCORE: 20
HELP NEEDED FOR BATHING: A LITTLE
DRESSING REGULAR UPPER BODY CLOTHING: A LITTLE

## 2025-04-10 NOTE — PROGRESS NOTES
Monitor Summary: Afib/Aflutter 65-68, HI --, QRS 0.08, QT 0.40, with rare couplets and rare PVCs per strip from monitor room.

## 2025-04-10 NOTE — DISCHARGE PLANNING
0995  Agency/Facility Name: [facility]   Spoke To: Josephine  Outcome: DPA inquired bed availability, per Josephine has bed available. Per Josephine has bed available and transportation for 1500.  DPA advised RN CM via teams for Pt Wheelchair  at 1500.    1036  Agency/Facility Name: [facility]  Outcome: BERNADETTE LVM advising Pt wants to go home instead of SNF.    1126  DPA sent HH referral to [facility] .

## 2025-04-10 NOTE — DISCHARGE SUMMARY
Discharge Summary    CHIEF COMPLAINT ON ADMISSION  No chief complaint on file.      Reason for Admission  Intradural extramedullary spinal tumor    Admission Date  4/7/2025    CODE STATUS  Full Code    HPI & HOSPITAL COURSE  This is a 69 y.o. male here with intradural tumor.  He underwent T11-T12 laminectomy with excision of tumor.  He underwent the surgery without complication.  On postoperative day #1 a rapid response was called.  A medicine consult was placed.  CT scan of the head was obtained which was normal.  He was found to have orthostatic hypotension.  Cortisol and TSH levels were normal.  He had been tolerating tube feeds.  The patient did decline CTA of the chest.    His condition did improve.  On postoperative day 2 a rehab and skilled nursing facility was placed.  On postoperative #3 he made improvements to his underlying condition.  He was back to baseline.  He was mobilizing and voiding.  He had worked with therapies which recommended placement.  Skilled nursing facility was arranged but the patient declined.  Case was discussed with Dr. Castano and case management.  Home health was arranged.  He was then discharged to home with home health.    No notes on file    Therefore, he is discharged in fair and stable condition Home with home health    The patient met 2-midnight criteria for an inpatient stay at the time of discharge.    Discharge Date  4/10/2025    FOLLOW UP ITEMS POST DISCHARGE  Continue with skilled nursing facility recommendations  Follow-up with Dr. Castano in 2 and 6 weeks  No NSAIDs  He will continue Lovenox.  He may start Xarelto postoperative day #10: 4/17/2025.  No activities more strenuous than walking    DISCHARGE DIAGNOSES  Principal Problem:    Intradural extramedullary spinal tumor (POA: Unknown)  Active Problems:    Thoracic intradural extramedullary tumor (POA: Yes)    Orthostatic hypotension (POA: Unknown)    Encephalopathy (POA: Unknown)    Dysphagia (POA: Unknown)     Junctional bradycardia (POA: Unknown)  Resolved Problems:    * No resolved hospital problems. *      FOLLOW UP  Future Appointments   Date Time Provider Department Center   4/25/2025 10:00 AM IRENE Gallagher [facility]   5/23/2025 10:00 AM IRENE Gallagher [facility]     No follow-up provider specified.    MEDICATIONS ON DISCHARGE     Medication List        START taking these medications        Instructions   acetaminophen 500 MG Tabs  Commonly known as: Tylenol   2 Tablets by Enteral Tube route every 6 hours.  Dose: 1,000 mg     cephALEXin 500 MG Caps  Commonly known as: Keflex   1 Capsule by Enteral Tube route 4 times a day for 5 days. G tube  Dose: 500 mg     enoxaparin 40 MG/0.4ML Sosy inj  Commonly known as: Lovenox   Inject 1 syringe (40 mg) under the skin every day for 10 days. Resume xarelto after completing Lovenox  Dose: 40 mg     oxyCODONE immediate-release 5 MG Tabs  Commonly known as: Roxicodone   Take 1 Tablet by mouth every 6 hours as needed for Severe Pain for up to 7 days.  Dose: 5 mg     pregabalin 50 MG capsule  Commonly known as: Lyrica   Take 1 Capsule by mouth 2 times a day for 30 days.  Dose: 50 mg     tizanidine 4 MG Tabs  Commonly known as: Zanaflex   1 Tablet by Enteral Tube route 3 times a day as needed (muscle spasm).  Dose: 4 mg            CONTINUE taking these medications        Instructions   Boost Very High Calorie Liqd   2 Bottles by Per G Tube route 3 times a day.  Dose: 2 Bottle     lansoprazole 30 MG Cpdr  Commonly known as: Prevacid   Take 30 mg by mouth every day.  Dose: 30 mg     loratadine 10 MG Tabs  Commonly known as: Claritin   Take 10 mg by mouth 1 time a day as needed (allergies).  Dose: 10 mg     metoprolol SR 25 MG Tb24  Commonly known as: Toprol XL   Take 25 mg by mouth every day.  Dose: 25 mg            STOP taking these medications      Xarelto 20 MG Tabs tablet  Generic drug: rivaroxaban              Allergies  No Known  Allergies    DIET  Orders Placed This Encounter   Procedures    Diet: Diet Tube Feed; Formula: Bolus Only; Select Bolus (If Indicated): Jevity 1.5 Carton; Bolus Frequency: QID; Number of Cartons Per Day: Seven     Jevity 1.5 bolus feedings. Recommend 2 cartons each TID at meal times and 1 carton at HS for a total of 7 cartons/day.     Standing Status:   Standing     Number of Occurrences:   1     Diet:   Diet Tube Feed [35]     Formula::   Bolus Only     Route:   Gtube/PEG     Select Bolus (If Indicated)::   Jevity 1.5 Carton     Bolus Frequency:   QID     Number of Cartons Per Day::   Seven    Diet NPO Restrict to: Strict     Standing Status:   Standing     Number of Occurrences:   1     Diet NPO Restrict to::   Strict [1]       ACTIVITY  As tolerated.  Weight bearing as tolerated    CONSULTATIONS  Medicine consult    PROCEDURES  T11-T12 laminectomy with excision of intradural tumor  - Schwannoma    LABORATORY  Lab Results   Component Value Date    SODIUM 138 04/09/2025    POTASSIUM 4.0 04/09/2025    CHLORIDE 106 04/09/2025    CO2 24 04/09/2025    GLUCOSE 96 04/09/2025    BUN 21 04/09/2025    CREATININE 0.75 04/09/2025    GLOMRATE 73 11/06/2023        Lab Results   Component Value Date    WBC 9.6 04/09/2025    HEMOGLOBIN 14.9 04/09/2025    HEMATOCRIT 44.7 04/09/2025    PLATELETCT 157 (L) 04/09/2025        Total time of the discharge process exceeds 35 minutes.

## 2025-04-10 NOTE — THERAPY
"Occupational Therapy  Discharge      Patient Name: Chevy Angeles  Age:  69 y.o., Sex:  male  Medical Record #: [id number]  Today's Date: 4/10/2025     Precautions  Precautions: (P) Fall Risk, Spinal / Back Precautions , PEG Tube, Other (See Comments)  Comments: No brace required this admission, Pump device-back    Assessment    Pt seen for follow up OT tx session, pt able to complete functional mobility and ADLs with supervision, eager to DC home versus go to rehab. Will recommend home health at discharge.    Plan    Reason for Discharge From Therapy: (P) Discharge Secondary to Goals Met    DC Equipment Recommendations: (P) Grab Bar(s) in Tub / Shower, Grab Bar(s) by Toilet, Raised Toilet Seat with Arms  Discharge Recommendations: (P) Recommend home health for continued occupational therapy services (per patient request)    Subjective    \"I don't need two weeks of that\"     Objective       04/10/25 1158   Precautions   Precautions Fall Risk;Spinal / Back Precautions ;PEG Tube;Other (See Comments)   Pain 0 - 10 Group   Therapist Pain Assessment Post Activity Pain Same as Prior to Activity;Nurse Notified   Cognition    Comments Pleasant, cooperative, receptive to therapy, seemed to understand precautions better   Active ROM Upper Body   Comments baseline function   Strength Upper Body   Comments baseline function   Balance   Sitting Balance (Static) Fair +   Sitting Balance (Dynamic) Fair   Standing Balance (Static) Fair   Standing Balance (Dynamic) Fair   Weight Shift Sitting Good   Weight Shift Standing Fair   Skilled Intervention Verbal Cuing   Bed Mobility    Comments up in chair before/after   Activities of Daily Living   Grooming Supervision;Seated   Upper Body Dressing Supervision   Lower Body Dressing Supervision   Skilled Intervention Verbal Cuing;Facilitation   How much help from another person does the patient currently need...   Putting on and taking off regular lower body clothing? 3   Bathing " (including washing, rinsing, and drying)? 3   Toileting, which includes using a toilet, bedpan, or urinal? 3   Putting on and taking off regular upper body clothing? 3   Taking care of personal grooming such as brushing teeth? 4   Eating meals? 4   6 Clicks Daily Activity Score 20   Functional Mobility   Sit to Stand Supervised   Bed, Chair, Wheelchair Transfer Supervised   Transfer Method Stand Step   Mobility seated ADLs, multiple STS from chair, left seated in chair   Skilled Intervention Verbal Cuing;Facilitation   Comments w/ FWW   Activity Tolerance   Sitting in Chair returned to chair at end of session   Patient / Family Goals   Patient / Family Goal #1 to go home   Goal #1 Outcome Goal met   Short Term Goals   Short Term Goal # 1 LB dressing with SPV and AE PRN   Goal Outcome # 1 Goal met   Short Term Goal # 2 toilet txf with SPV   Goal Outcome # 2 Goal met   Short Term Goal # 3 verbalize and maintain spinal precautions during ADLs with SPV and no v/cs   Goal Outcome # 3 Goal met   Short Term Goal # 4 standing g/h w/SPV   Goal Outcome # 4 Goal not met   Education Group   Education Provided Role of Occupational Therapist;Spinal Precautions   Role of Occupational Therapist Patient Response Patient;Acceptance;Explanation;Verbal Demonstration;Reinforcement Needed   Spinal Precautions Patient Response Patient;Acceptance;Explanation   Occupational Therapy Treatment Plan    O.T. Treatment Plan Modify Current Treatment Plan   Reason For Discharge Discharge Secondary to Goals Met   Anticipated Discharge Equipment and Recommendations   DC Equipment Recommendations Grab Bar(s) in Tub / Shower;Grab Bar(s) by Toilet;Raised Toilet Seat with Arms   Discharge Recommendations Recommend home health for continued occupational therapy services  (per patient request)   Interdisciplinary Plan of Care Collaboration   IDT Collaboration with  Nursing   Patient Position at End of Therapy Seated;Chair Alarm On;Call Light within  Reach;Tray Table within Reach;Phone within Reach   Collaboration Comments RN updated

## 2025-04-10 NOTE — DISCHARGE PLANNING
Case Management Discharge Planning    Admission Date: 4/7/2025  GMLOS: 2.5  ALOS: 3    6-Clicks ADL Score: 16  6-Clicks Mobility Score: 18  PT and/or OT Eval ordered: Yes  Post-acute Referrals Ordered: Yes  Post-acute Choice Obtained: Yes  Has referral(s) been sent to post-acute provider:  Yes      Anticipated Discharge Dispo: Discharge Disposition: D/T to home under [facility] care in anticipation of covered skilled care (06)    DME Needed: No    Action(s) Taken: Updated Provider/Nurse on Discharge Plan  RN GERALDO met with Pt at bedside. Pt changed his mind and wanted to go home with HH instead of going to SNF. [facility] by [facility]. Received verbal approval for [facility], choice form completed, faxed to [facility] for processing. Pt's friend Gordy will provide transport home. IMM delivered. NIGEL Pires and bedside nurse informed.    Pt has PEG tube, per Pt [facility] provides his tube feeds and they just called him. Pt said he has enough tube feeds supply for the whole month and [facility] will send more for next month. Pt does his own bolus feeds.     [facility] declined, Referral sent to [facility].    1430- [facility] accepted Pt. NIGEL Pires and bedside nurse informed.     Escalations Completed: Provider and Bedside RN    Medically Clear: Yes    Next Steps: Follow up  acceptance    Barriers to Discharge: Outpatient referrals pending and Transportation    Is the patient up for discharge tomorrow: No  Pt is discharging today.

## 2025-04-10 NOTE — CARE PLAN
The patient is Stable - Low risk of patient condition declining or worsening    Shift Goals  Clinical Goals: Stable neuros  Patient Goals: Rest  Family Goals: VIRGIE    Progress made toward(s) clinical / shift goals:  Patient is A&Ox3-4. Educated patient on POC. Expressive aphasia remains stable. Continuous IVF in place. Ambulated patient around unit using a single point cane. Patient tolerated ambulation well. Patient denied pain. Patient remains NPO with bolus feeds. Urinal at bedside for voiding needs. CPAP in use for sleep. Bed is low and locked. Bed alarm on. Call light within reach. Hourly rounding continues.      Problem: Pain - Standard  Goal: Alleviation of pain or a reduction in pain to the patient’s comfort goal  Outcome: Progressing     Problem: Communication  Goal: The ability to communicate needs accurately and effectively will improve  Outcome: Progressing     Problem: Mobility  Goal: Patient's capacity to carry out activities will improve  Outcome: Progressing       Patient is not progressing towards the following goals:

## 2025-04-10 NOTE — PROGRESS NOTES
Patient's condition and case discussed with Dr. Castano and case management.  Skilled nursing facility was arranged but the patient declines.  He is electing to go home with home health.  He is neurologically stable from neurosurgical perspective.  He will go home with home health.  He may discharged today as he is medically clear.

## 2025-04-10 NOTE — DISCHARGE PLANNING
Good morning,  This referral has been escalated to a Clinical Supervisor for review. This issue will be resolved as quickly as possible. Thank you!

## 2025-04-10 NOTE — DISCHARGE PLANNING
ATTN: Case Management  RE: Referral for Home Health    Reason for referral denial: Patient needs to be seen within 48 hours of discharge and at this time [facility] is unable to accomodate.              Unfortunately, we are not able to accept this referral for the reason listed above. If further clarity is needed, our Transitional Care Specialists are available to discuss any barriers to service at [phone].      We look forward to collaborating with you in the future,  [facility] Team

## 2025-04-10 NOTE — DISCHARGE PLANNING
Physiatry to consult.     1012-Please review the consult from Dr. Pierce regarding post acute recommendations.  TCC will no longer follow.  Please reach out to myself with any questions.

## 2025-04-10 NOTE — FACE TO FACE
Face to Face Supporting Documentation - Home Health    The encounter with this patient was in whole or in part the primary reason for home health admission.    Date of encounter:   Patient:                    MRN:                       YOB: 2024  Chevy Angeles  [id number]  1955     Home health to see patient for:  Skilled Nursing care for assessment, interventions & education    Skilled need for:  Surgical Aftercare S/P T11-T12 laminectomy with excision of intradural tumor    Skilled nursing interventions to include:  Comment: Postop care    Homebound status evidenced by:  Need the aid of supportive devices such as crutches, canes, wheelchairs or walkers. Leaving home requires a considerable and taxing effort. There is a normal inability to leave the home.    Community Physician to provide follow up care: Ammon Garay D.O.     Optional Interventions? Yes, Details: PT/OT      I certify the face to face encounter for this home health care referral meets the CMS requirements and the encounter/clinical assessment with the patient was, in whole, or in part, for the medical condition(s) listed above, which is the primary reason for home health care. Based on my clinical findings: the service(s) are medically necessary, support the need for home health care, and the homebound criteria are met.  I certify that this patient has had a face to face encounter by myself.  Pranay Pelletier P.A.-C. - NPI: [id number]

## 2025-04-10 NOTE — CONSULTS
Physical Medicine and Rehabilitation Consultation          Date of initial consultation: 4/10/2025  Consulting provider: Mango Denton P.A.-C.   Reason for consultation: assess for acute inpatient rehab appropriateness  LOS: 3 Day(s)    Chief complaint: Thoracic tumor    HPI: The patient is a 69 y.o. right hand dominant male with a past medical history of chronic mild expressive aphasia, dysphagia with PEG tube, pacemaker, thoracic intradural extramedullary tumor;  who presented on 4/7/2025  5:26 AM for planned procedure with Dr. Fabi Castano, performed decompressive laminectomy and resection of intradural tumor from T11-T12 on 4/7.  Postoperatively patient developed hypotension and encephalopathy.    The patient currently reports overall doing well.  Patient seen walking in the halls with a single-point cane at supervision level.  Patient did 2 laps around the unit.  He has some soak through on his back from his surgical incision, endorses some back pain.  Has a CPAP in the room, reports support from his friend Gordy.    ROS  Pertinent positives are mentioned in the HPI, all others reviewed and are negative.    Social Hx:  1 SH  Ramp DANILO  With: Alone    THERAPY:  Restrictions: Fall risk, spinal/back precautions  PT: Functional mobility   4/9: Walking 90 feet x 2 with single-point cane at contact-guard assist    OT: ADLs  4/8: Mod assist lower body dressing    SLP:   4/9: NPO with PEG tube, ice chips okay    IMAGING:  CT head 4/8/25  1.  Scattered postoperative pneumocephalus.  2.  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  3.  No acute intracranial hemorrhage.  4.  Left retromastoid craniotomy with extensive left cerebellar encephalomalacia.    PROCEDURES:  Fabi Castano MD 4/7/2025  1.  Multiple schwannomatosis.  2.  Status post previous resection of brainstem schwannoma with significant   neurological deficits.    3.  Status post resection of intradural extramedullary   T7 lesion.  4.   "Status post previous resection of T7 tumor, C3-4 2 [id number] tumor in 2013  5.  Multiple lumbar tumors.   6.  New left frontal lesion  7.  Expanding cystic large right T11-12 tumor compressing the cord    PMH:  Past Medical History:   Diagnosis Date    Anesthesia     \"difficulty swallowing\"    Arrhythmia     afib    Cancer (HCC) 2013    benign tumors    Indigestion     Infectious disease 2007    MRSA    Pacemaker     Pneumonia     Sleep apnea     bipap       PSH:  Past Surgical History:   Procedure Laterality Date    THORACIC LAMINECTOMY  4/7/2025    Procedure: T11-12 decompressive laminectomy and resection of intradural tumor;  Surgeon: Fabi Castano M.D.;  Location: [facility];  Service: Orthopedics    LUMBAR LAMINECTOMY DISKECTOMY  09/19/2018    Procedure: LUMBAR LAMINECTOMY- T12-L1 DECOMPRESSIVE LAMI AND RESECTION OF INTRADURAL EXTRAMEDULLARY TUMOR;  Surgeon: Fabi Castano M.D.;  Location: [facility];  Service: Neurosurgery    CERVICAL LAMINECTOMY POSTERIOR  11/12/2013    Performed by Fabi Castano M.D. at SURGERY [facility]    CERVICAL DECOMPRESSION POSTERIOR  11/12/2013    Performed by Fabi Castano M.D. at [facility]    CERVICAL FORAMINOTOMY  11/12/2013    Performed by Fabi Castano M.D. at [facility]    RECOVERY  07/29/2013    Performed by -Recovery Surgery at [facility]    THORACIC LAMINECTOMY  07/22/2013    Performed by Fabi Castano M.D. at [facility]    LESION EXCISION NEURO  07/22/2013    Performed by Fabi Castano M.D. at [facility]    OTHER ORTHOPEDIC SURGERY  01/01/2012    clavical    OTHER  01/01/2010    throat surgery    OTHER NEUROLOGICAL SURG  01/01/2007    SCHWANNOMA REMOVED FROM BRAIN STEM    OTHER ORTHOPEDIC SURGERY  01/01/2006    LEFT ELBOW SURGERY W/HARDWARE    OTHER ABDOMINAL SURGERY  01/01/1999    ABD SCHWANOMA REMOVED    OTHER ORTHOPEDIC SURGERY      RIGHT KNEE SHWANNOMA " REMOVED X 2    OTHER ORTHOPEDIC SURGERY      LEFT HIP SCHWANNOMA REMOVED    OTHER ORTHOPEDIC SURGERY      RIGHT UPPER LEG SCHWANNOMA REMOVED    OTHER ORTHOPEDIC SURGERY      RIGHT UPPER ARM SCHWANNOMA REMOVED    PACEMAKER INSERTION         FHX:  Non-pertinent to today's issues    Medications:  Current Facility-Administered Medications   Medication Dose    pregabalin (Lyrica) capsule 50 mg  50 mg    famotidine (Pepcid) injection 20 mg  20 mg    enoxaparin (Lovenox) inj 40 mg  40 mg    cephALEXin (Keflex) capsule 500 mg  500 mg    NS infusion      oxyCODONE immediate-release (Roxicodone) tablet 2.5 mg  2.5 mg    Or    oxyCODONE immediate-release (Roxicodone) tablet 5 mg  5 mg    Pharmacy Consult Request ...Pain Management Review 1 Each  1 Each    MD ALERT...DO NOT ADMINISTER NSAIDS or ASPIRIN unless ORDERED By Neurosurgery 1 Each  1 Each    ondansetron (Zofran) syringe/vial injection 4 mg  4 mg    labetalol (Normodyne/Trandate) injection 10 mg  10 mg    benzocaine-menthol (Cepacol) lozenge 1 Lozenge  1 Lozenge    bisacodyl (Dulcolax) suppository 10 mg  10 mg    sodium phosphate enema 1 Each  1 Each    Pharmacy Consult: Enteral tube insertion - review meds/change route/product selection  1 Each    acetaminophen (Tylenol) tablet 1,000 mg  1,000 mg    Followed by    [START ON 4/12/2025] acetaminophen (Tylenol) tablet 1,000 mg  1,000 mg    diphenhydrAMINE (Benadryl) tablet/capsule 25 mg  25 mg    Or    diphenhydrAMINE (Benadryl) injection 25 mg  25 mg    loratadine (Claritin) tablet 10 mg  10 mg    metoprolol tartrate (Lopressor) tablet 25 mg  25 mg    ALPRAZolam (Xanax) tablet 0.25 mg  0.25 mg    docusate sodium (Colace) oral solution 100 mg  100 mg    magnesium hydroxide (Milk Of Magnesia) suspension 30 mL  30 mL    lansoprazole (Prevacid) solutab 30 mg  30 mg    ondansetron (Zofran ODT) dispertab 4 mg  4 mg    polyethylene glycol/lytes (Miralax) Packet 1 Packet  1 Packet    senna-docusate (Pericolace Or Senokot S)  "8.6-50 MG per tablet 1 Tablet  1 Tablet    senna-docusate (Pericolace Or Senokot S) 8.6-50 MG per tablet 1 Tablet  1 Tablet    tizanidine (Zanaflex) tablet 4 mg  4 mg    Nozin nasal  swab  1 Applicator    ropivacaine 0.2 % (Naropin) 270 mL in On-Q Pump infusion         Allergies:  No Known Allergies      Physical Exam:  Vitals: /83   Pulse 68   Temp 36.2 °C (97.2 °F) (Temporal)   Resp 17   Ht 1.981 m (6' 6\")   Wt 91.5 kg (201 lb 11.5 oz)   SpO2 94%   Gen: NAD  Head: NC/AT  Eyes/ Nose/ Mouth: PERRLA, moist mucous membranes  Cardio: RRR, good distal perfusion, warm extremities  Pulm: normal respiratory effort, no cyanosis   Abd: Soft NTND, negative borborygmi   Ext: No peripheral edema. No calf tenderness. No clubbing.    Mental status:  A&Ox4 (person, place, date, situation) answers questions appropriately follows commands  Speech: fluent, no aphasia or dysarthria    Motor:      Upper Extremity  Myotome R L   Shoulder flexion C5 5 5   Elbow flexion C5 5 5   Wrist extension C6 5 5   Elbow extension C7 5 5   Finger flexion C8 5 5   Finger abduction T1 5 5     Lower Extremity Myotome R L   Hip flexion L2 5 5   Knee extension L3 5 5   Ankle dorsiflexion L4 5 5   Toe extension L5 5 5   Ankle plantarflexion S1 5 5       Labs: Reviewed and significant for   Recent Labs     04/08/25  0238 04/09/25  1027   RBC 4.61* 4.45*   HEMOGLOBIN 15.8 14.9   HEMATOCRIT 46.2 44.7   PLATELETCT 192 157*   PROTHROMBTM 15.1* 15.1*   APTT 24.9 27.8   INR 1.19* 1.19*     Recent Labs     04/08/25  0238 04/09/25  1027   SODIUM 136 138   POTASSIUM 5.1 4.0   CHLORIDE 102 106   CO2 23 24   GLUCOSE 138* 96   BUN 17 21   CREATININE 0.80 0.75   CALCIUM 9.0 8.4*     Recent Results (from the past 24 hours)   EKG    Collection Time: 04/09/25  9:02 AM   Result Value Ref Range    Report       [facility]    Test Date:  2025-04-08  Pt Name:    FRANKI STANLEY                Department: 131  MRN:        [id number]                      " Room:       [facility]  Gender:     Male                         Technician: AJNADYA  :        1955                   Requested By:PENELOPE GRAY  Order #:    [id number]                    Reading MD: Cesar Huynh MD    Measurements  Intervals                                Axis  Rate:       70                           P:          0  DE:         32                           QRS:        128  QRSD:       142                          T:          24  QT:         458  QTc:        495    Interpretive Statements  Sinus rhythm  Short DE interval  Right bundle branch block  Lateral infarct, old  Electronically Signed On 2025 09:02:33 PDT by Cesar Huynh MD     Basic Metabolic Panel (BMP)    Collection Time: 25 10:27 AM   Result Value Ref Range    Sodium 138 135 - 145 mmol/L    Potassium 4.0 3.6 - 5.5 mmol/L    Chloride 106 96 - 112 mmol/L    Co2 24 20 - 33 mmol/L    Glucose 96 65 - 99 mg/dL    Bun 21 8 - 22 mg/dL    Creatinine 0.75 0.50 - 1.40 mg/dL    Calcium 8.4 (L) 8.5 - 10.5 mg/dL    Anion Gap 8.0 7.0 - 16.0   CBC without Differential    Collection Time: 25 10:27 AM   Result Value Ref Range    WBC 9.6 4.8 - 10.8 K/uL    RBC 4.45 (L) 4.70 - 6.10 M/uL    Hemoglobin 14.9 14.0 - 18.0 g/dL    Hematocrit 44.7 42.0 - 52.0 %    .4 (H) 81.4 - 97.8 fL    MCH 33.5 (H) 27.0 - 33.0 pg    MCHC 33.3 32.3 - 36.5 g/dL    RDW 48.6 35.9 - 50.0 fL    Platelet Count 157 (L) 164 - 446 K/uL    MPV 10.9 9.0 - 12.9 fL   Prothrombin Time (INR)    Collection Time: 25 10:27 AM   Result Value Ref Range    PT 15.1 (H) 12.0 - 14.6 sec    INR 1.19 (H) 0.87 - 1.13   APTT    Collection Time: 25 10:27 AM   Result Value Ref Range    APTT 27.8 24.7 - 36.0 sec   TSH WITH REFLEX TO FT4    Collection Time: 25 10:27 AM   Result Value Ref Range    TSH 2.220 0.380 - 5.330 uIU/mL   CORTISOL    Collection Time: 25 10:27 AM   Result Value Ref Range    Cortisol 7.9 0.0 - 23.0 ug/dL   AMMONIA    Collection Time:  04/09/25 10:27 AM   Result Value Ref Range    Ammonia 26 11 - 45 umol/L   ESTIMATED GFR    Collection Time: 04/09/25 10:27 AM   Result Value Ref Range    GFR (CKD-EPI) 98 >60 mL/min/1.73 m 2         ASSESSMENT:  Patient is a 69 y.o. male admitted with intradural extramedullary tumor now s/p resection    Rehabilitation: Impaired ADLs and mobility  Barriers to transfer include: Insurance authorization, TCCs to verify disposition, medical clearance and bed availability. All cases are subject to administrative review.       Disposition recommendations:  - Patient is walking well enough to where he can discharge home.  He has support from his friend Gordy, he is walking greater than household distances at supervision level.  - Recommend home discharge  -PMR will sign off, please reconsult or reach out via [employer] if further evaluation or medical management is requested    Medical Complexity:    Thoracic intradural extramedullary tumor  - Status post resection from T11-T12 on 4/7 by Dr. Fabi Castano MD  - Continue PT OT while in-house  - Able to be discharged home when medically cleared    Orthostatic hypotension  - Still having some issues with OH  - Starting midodrine 5 mg 3 times daily    Encephalopathy  - Likely due to hypotension  - Awake, alert, oriented, answering questions appropriately  - Monitor    Dysphagia  - Chronic  - Has PEG tube        DVT PPX: SCDs      Thank you for allowing us to participate in the care of this patient.     Total time: >80 minutes. This includes time spent reviewing patient's history, notes, labs, imaging, vitals, medications, examining patient, discussing care with patient and family including prognosis, risk reduction, benefits of treatment, and options for next stage of care, and communicating recommendations to primary team.     Addi Pierce, DO   Physical Medicine and Rehabilitation     Please note that this dictation was created using voice recognition software. I have made every  reasonable attempt to correct obvious errors, but there may be errors of grammar and possibly content that I did not discover before finalizing the note.

## 2025-04-10 NOTE — PROGRESS NOTES
"Neurosurgery  POD# 3  Mobilizing  Voiding, some post void residual, monitoring for now.  Denies nausea, vomiting, headache  Pain controlled on current medication regimen  Denies and pain, numbness or tingling in his legs, no flank pain today  Had hypotensive episode 2 days ago with altered level of consciousness, rapid response was initiated, medicine was consulted immediately.   More alert today  -medicine signed off  Rehab consult placed      Objective:  /67   Pulse 68   Temp 36.6 °C (97.9 °F) (Temporal)   Resp 17   Ht 1.981 m (6' 6\")   Wt 91.5 kg (201 lb 11.5 oz)   SpO2 88%     Intake/Output Summary (Last 24 hours) at 4/8/2025 0748  Last data filed at 4/8/2025 0700  Gross per 24 hour   Intake 923.25 ml   Output 2000 ml   Net -1076.75 ml       Recent Labs     04/08/25  0238 04/09/25  1027   WBC 11.3* 9.6   RBC 4.61* 4.45*   HEMOGLOBIN 15.8 14.9   HEMATOCRIT 46.2 44.7   .2* 100.4*   MCH 34.3* 33.5*   MCHC 34.2 33.3   RDW 47.2 48.6   PLATELETCT 192 157*   MPV 10.8 10.9     Recent Labs     04/08/25  0238 04/09/25  1027   SODIUM 136 138   POTASSIUM 5.1 4.0   CHLORIDE 102 106   CO2 23 24   GLUCOSE 138* 96   BUN 17 21     Recent Labs     04/08/25  0238 04/09/25  1027   APTT 24.9 27.8   INR 1.19* 1.19*     VSS  LS  CT head showed no acute pathology.  ECG no acute changes, on tele monitor   Alert, oriented to self, time, place and situation, there was initially some confusion, upon further discussion, he is completely oriented, we will continue to observe.  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 Ambulate as tolerated, up to chair for meals  2. Disposition: Rehab vs. SNF. Consults today.   3. Medically clear for transfer to rehab vs. SNF at this time   4. Continue lovenox. Clear to restart xarelto POD #10

## 2025-04-10 NOTE — PROGRESS NOTES
Monitor summary: A-fib/flut 65-74, [place] --, QRS 0.085, QT 0.39, with occasional couplets, PVCs per strip from monitor room.

## 2025-05-05 ENCOUNTER — TELEPHONE (OUTPATIENT)
Dept: NEUROSURGERY | Facility: MEDICAL CENTER | Age: 70
End: 2025-05-05
Payer: MEDICARE

## 2025-05-05 RX ORDER — DOXYCYCLINE HYCLATE 100 MG
100 TABLET ORAL 2 TIMES DAILY
Qty: 28 TABLET | Refills: 0 | Status: SHIPPED | OUTPATIENT
Start: 2025-05-05

## 2025-05-05 NOTE — TELEPHONE ENCOUNTER
Patient is seeing PCP Today for removal of remaining sutures which were removed while in SNF. Will add doxycydline BID for wound redness.     ----- Message from Medical Assistant Ebenezer Brizuela Ass't sent at 5/2/2025  3:09 PM PDT -----  HEY, A NURSE FROM [facility] CALLED AND SAID THIS PATIENT HAS SOME REDNESS AROUND SURGICAL INCISION AND STILL HAS THREE SUTURES IN THERE STILL ONE ON TOP RIGHT AND TWO ON BOTTOM LEFT. NO FEVER OR ANYTHING just the sutures and redness. I left the patient a message to send a photo via [employer].

## (undated) DEVICE — SET LEADWIRE 5 LEAD BEDSIDE DISPOSABLE ECG (1SET OF 5/EA)

## (undated) DEVICE — SUTURE ETHILON 2-0 FSLX 30 (36PK/BX)"

## (undated) DEVICE — TUBING C&T SET FLYING LEADS DRAIN TUBING (10EA/BX)

## (undated) DEVICE — PATTIES SURG X-RAYCOTTONOID - 1/2 X 3 IN (200/CA)

## (undated) DEVICE — LACTATED RINGERS INJ 1000 ML - (14EA/CA 60CA/PF)

## (undated) DEVICE — DRAPE SURG STERI-DRAPE 7X11OD - (10EA/BX, 40EA/CA)

## (undated) DEVICE — LACTATED RINGERS INJ. 500 ML - (24EA/CA)

## (undated) DEVICE — TIP ASPIRATOR SONOPET IQ STRAIGHT MICRO DIAMETER (4EA/PK)

## (undated) DEVICE — GLOVE BIOGEL SZ 8 SURGICAL PF LTX - (50PR/BX 4BX/CA)

## (undated) DEVICE — DRAPE STRLE REG TOWEL 18X24 - (10/BX 4BX/CA)"

## (undated) DEVICE — CHLORAPREP 26 ML APPLICATOR - ORANGE TINT(25/CA)

## (undated) DEVICE — SUTURE GENERAL

## (undated) DEVICE — DRAPE LAPAROTOMY T SHEET - (12EA/CA)

## (undated) DEVICE — ELECTRODE DUAL RETURN W/ CORD - (50/PK)

## (undated) DEVICE — PACK NEURO - (2EA/CA)

## (undated) DEVICE — SUTURE 1 VICRYL PLUS CT-1 - 18 INCH (12/BX)

## (undated) DEVICE — RESERVOIR SUCTION 100 CC - SILICONE (20EA/CA)

## (undated) DEVICE — CLIP SM INTNL HRZN TI ESCP LGT - (24EA/PK 25PK/BX)

## (undated) DEVICE — CATHETER IV NON-SAFETY 16 GA X 2 STRAIGHT HUB RADIOPAQUE IV 200/CA

## (undated) DEVICE — COVER MAYO STAND X-LG - (22EA/CA)

## (undated) DEVICE — PACK NEURO - (3EA/CA)

## (undated) DEVICE — SUTURE 2-0 VICRYL PLUS CT-1 - 8 X 18 INCH(12/BX)

## (undated) DEVICE — COVER LIGHT HANDLE ALC PLUS DISP (18EA/BX)

## (undated) DEVICE — TUBE E-T HI-LO CUFF 6.0MM (10/PK)

## (undated) DEVICE — SUTURE GOR-TEX CV-6 TT-9 (36PK/BX)

## (undated) DEVICE — CATHETER ON-Q SILVER SOAKER 5IN (5EA/CA) - SUB ORDER #4428

## (undated) DEVICE — TUBING CLEARLINK DUO-VENT - C-FLO (48EA/CA)

## (undated) DEVICE — DERMABOND ADVANCED - (12EA/BX)

## (undated) DEVICE — ELECTRODE 850 FOAM ADHESIVE - HYDROGEL RADIOTRNSPRNT (50/PK)

## (undated) DEVICE — GOWN WARMING STANDARD FLEX - (30/CA)

## (undated) DEVICE — SUTURE 1 VICRYL PLUS CTX - 8 X 18 INCH (12/BX)

## (undated) DEVICE — SURGIFOAM (12X7) - (12EA/CA)

## (undated) DEVICE — CASSETTE IRRIGATION SONOPET IQ SUCTION (4EA/PK)

## (undated) DEVICE — CATHETER URETHRAL FOLEY SILICONE OD14 FR 10 ML (10EA/CA)

## (undated) DEVICE — DEVICE MONOPOLAR RF PEAK PLASMABLADE 3.0S

## (undated) DEVICE — PROTECTOR ULNA NERVE - (36PR/CA)

## (undated) DEVICE — CATHETER ON-Q SILVER SOAKER 5IN  (5EA/CA)  - SUB ORDER #4428

## (undated) DEVICE — HEMOSTAT ABSORBABLE POWDER SURGICEL 3G (5EA/BX)

## (undated) DEVICE — SET EXTENSION WITH 2 PORTS (48EA/CA) ***PART #2C8610 IS A SUBSTITUTE*****

## (undated) DEVICE — SUCTION TUBE FRAZIER 12FR STERILE (40EA/CA)

## (undated) DEVICE — SODIUM CHL IRRIGATION 0.9% 1000ML (12EA/CA)

## (undated) DEVICE — GLOVE BIOGEL INDICATOR SZ 8 SURGICAL PF LTX - (50/BX 4BX/CA)

## (undated) DEVICE — GLOVE BIOGEL PI INDICATOR SZ 7.0 SURGICAL PF LF - (50/BX 4BX/CA)

## (undated) DEVICE — HEADREST PRONEVIEW LARGE - (10/CA)

## (undated) DEVICE — TOOL DISSECT MATCH HEAD

## (undated) DEVICE — SUTURE 2-0 VICRYL PLUS CT-1 36 (36PK/BX)"

## (undated) DEVICE — TUBE E-T HI-LO CUFF 6.5MM (10EA/BX)

## (undated) DEVICE — KIT SURGIFLO W/OUT THROMBIN - (6EA/CA)

## (undated) DEVICE — SEALER BIPOLAR 2.3 AQUAMANTYS

## (undated) DEVICE — KIT ROOM DECONTAMINATION

## (undated) DEVICE — TIP EXTENDED DURAL SEALANT AUTOSPRAY ADHERUS (5EA/PK)

## (undated) DEVICE — INTRAOP NEURO IN OR 1:1 PER 15 MIN

## (undated) DEVICE — TOOL MR8 14CM MATCH HD SYM-TRI 3MM DIAMETER (1/EA)

## (undated) DEVICE — MIDAS LUBRICATOR DIFFUSER PACK (4EA/CA)

## (undated) DEVICE — KIT EVACUATER 3 SPRING PVC LF 1/8 DRAIN SIZE (10EA/CA)"

## (undated) DEVICE — MASK ANESTHESIA ADULT  - (100/CA)

## (undated) DEVICE — PATTIES SURG NEURO X-RAY 1/2X1/2 (10EA/PK 20PK/CS)

## (undated) DEVICE — SUTURE 4-0 NUROLON CR/8 TF - (12/BX) ETHICON

## (undated) DEVICE — NEPTUNE 4 PORT MANIFOLD - (20/PK)

## (undated) DEVICE — SUCTION INSTRUMENT YANKAUER BULBOUS TIP W/O VENT (50EA/CA)

## (undated) DEVICE — SENSOR OXIMETER ADULT SPO2 RD SET (20EA/BX)

## (undated) DEVICE — DRAPE 36X28IN RAD CARM BND BG - (25/CA)

## (undated) DEVICE — SLEEVE VASO DVT COMPRESSION CALF MED - (10PR/CA)

## (undated) DEVICE — BLADE SURGICAL CLIPPER - (50EA/CA)

## (undated) DEVICE — SPONGE GAUZESTER 4 X 4 4PLY - (128PK/CA)

## (undated) DEVICE — SENSOR SPO2 NEO LNCS ADHESIVE (20/BX) SEE USER NOTES

## (undated) DEVICE — GLOVE SIZE 6.5 SURGEON ACCELERATOR FREE GREEN (50PR/BX)

## (undated) DEVICE — ARMREST CRADLE FOAM - (2PR/PK 12PR/CA)

## (undated) DEVICE — Device

## (undated) DEVICE — DRAPE SURG STERI-DRAPE 7X11OD - (40EA/CA)

## (undated) DEVICE — KIT SURGIFLO W/OUT THROMBIN - (6EA/BX)

## (undated) DEVICE — CANISTER SUCTION 3000ML MECHANICAL FILTER AUTO SHUTOFF MEDI-VAC NONSTERILE LF DISP (40EA/CA)

## (undated) DEVICE — KIT ANESTHESIA W/CIRCUIT & 3/LT BAG W/FILTER (20EA/CA)

## (undated) DEVICE — CANISTER SUCTION 3000ML MECHANICAL FILTER AUTO SHUTOFF MEDI-VAC NONSTERILE LF DISP  (40EA/CA)

## (undated) DEVICE — SLEEVE, VASO, THIGH, MED